# Patient Record
Sex: MALE | Race: WHITE | Employment: FULL TIME | URBAN - METROPOLITAN AREA
[De-identification: names, ages, dates, MRNs, and addresses within clinical notes are randomized per-mention and may not be internally consistent; named-entity substitution may affect disease eponyms.]

---

## 2017-01-13 ENCOUNTER — HOSPITAL ENCOUNTER (OUTPATIENT)
Age: 52
Discharge: HOME | End: 2017-01-13
Attending: EMERGENCY MEDICINE
Payer: COMMERCIAL

## 2017-01-13 ENCOUNTER — APPOINTMENT (OUTPATIENT)
Dept: GENERAL RADIOLOGY | Age: 52
End: 2017-01-13
Attending: EMERGENCY MEDICINE
Payer: COMMERCIAL

## 2017-01-13 VITALS
RESPIRATION RATE: 16 BRPM | SYSTOLIC BLOOD PRESSURE: 178 MMHG | DIASTOLIC BLOOD PRESSURE: 96 MMHG | HEART RATE: 86 BPM | TEMPERATURE: 98 F | HEIGHT: 68 IN | WEIGHT: 255 LBS | OXYGEN SATURATION: 100 % | BODY MASS INDEX: 38.65 KG/M2

## 2017-01-13 DIAGNOSIS — T14.8XXA MUSCLE STRAIN: Primary | ICD-10-CM

## 2017-01-13 PROCEDURE — 73030 X-RAY EXAM OF SHOULDER: CPT

## 2017-01-13 PROCEDURE — 93010 ELECTROCARDIOGRAM REPORT: CPT | Performed by: EMERGENCY MEDICINE

## 2017-01-13 PROCEDURE — 71020 XR CHEST PA + LAT CHEST (CPT=71020): CPT

## 2017-01-13 PROCEDURE — 99214 OFFICE O/P EST MOD 30 MIN: CPT

## 2017-01-13 PROCEDURE — 93010 ELECTROCARDIOGRAM REPORT: CPT

## 2017-01-13 PROCEDURE — 93005 ELECTROCARDIOGRAM TRACING: CPT

## 2017-01-13 PROCEDURE — 99215 OFFICE O/P EST HI 40 MIN: CPT

## 2017-01-13 RX ORDER — METHYLPREDNISOLONE 4 MG/1
TABLET ORAL
Qty: 1 PACKAGE | Refills: 0 | Status: SHIPPED | OUTPATIENT
Start: 2017-01-13 | End: 2017-01-16

## 2017-01-13 RX ORDER — DIAZEPAM 2 MG/1
2 TABLET ORAL 2 TIMES DAILY
Qty: 14 TABLET | Refills: 0 | Status: SHIPPED | OUTPATIENT
Start: 2017-01-13 | End: 2017-01-16

## 2017-01-13 NOTE — ED PROVIDER NOTES
Fernando Bueno is a 54year old male who presents for evaluation left shoulder and arm pain and swelling. HPI:   Pt complains of left sided pain which radiates from the neck to the shoulder and to the chest and upper back.   Patient has had symptoms pres COMPLETE  07/19/2012    Comment scanned to media tab, 07/19/2012    INJ PARAVERT F JNT L/S 1 LEV Right 9/15/2014    Comment Procedure: FACET INJECTION UNDER FLUOROSCOPY;  Surgeon: Rosey Mosher DO;  Location: [address] [place] [address] BY  ADELAIDA within normal limits  Labs Reviewed - No data to display    XR CHEST PA + LAT CHEST (ENE=[id number])   Final Result    PROCEDURE: XR CHEST PA + LAT CHEST (CPT=71020)         COMPARISON: [facility], X CHEST PA LAT ROUTINE,     11/05/2009, 15:11. 11/05/2009, 15:11. INDICATIONS: Left upper back pain, left axillary chest and shoulder pain radiates into neck x1 week. Tingling down left arm. TECHNIQUE:   Two views. FINDINGS: CARDIAC/VASC: Normal.  No cardiac silhouette abnormality or cardiomegaly.

## 2017-01-16 ENCOUNTER — OFFICE VISIT (OUTPATIENT)
Dept: FAMILY MEDICINE CLINIC | Facility: CLINIC | Age: 52
End: 2017-01-16

## 2017-01-16 VITALS
HEART RATE: 76 BPM | SYSTOLIC BLOOD PRESSURE: 146 MMHG | BODY MASS INDEX: 38.49 KG/M2 | TEMPERATURE: 98 F | RESPIRATION RATE: 18 BRPM | HEIGHT: 68 IN | WEIGHT: 254 LBS | DIASTOLIC BLOOD PRESSURE: 97 MMHG

## 2017-01-16 DIAGNOSIS — M25.512 ACUTE PAIN OF LEFT SHOULDER: ICD-10-CM

## 2017-01-16 PROCEDURE — 99213 OFFICE O/P EST LOW 20 MIN: CPT | Performed by: FAMILY MEDICINE

## 2017-01-16 PROCEDURE — 99212 OFFICE O/P EST SF 10 MIN: CPT | Performed by: FAMILY MEDICINE

## 2017-01-16 RX ORDER — DIAZEPAM 2 MG/1
2 TABLET ORAL 2 TIMES DAILY
Qty: 30 TABLET | Refills: 0 | Status: SHIPPED | OUTPATIENT
Start: 2017-01-16 | End: 2017-03-06 | Stop reason: ALTCHOICE

## 2017-01-16 NOTE — PROGRESS NOTES
HPI:    Patient ID: Danilo Khan is a 54year old male. HPI Comments: Pt presents with pain of the left shoulder for the last week. Has had clicking of the shoulder. No injury or trauma.  Pt was seen in immediate care and was placed on steroid pack a Take 1 tablet (2 mg total) by mouth 2 (two) times daily. As needed.            Imaging & Referrals:  None       ZD#[id number]

## 2017-02-01 PROBLEM — M25.512 ACUTE PAIN OF LEFT SHOULDER: Status: ACTIVE | Noted: 2017-02-01

## 2017-02-01 PROBLEM — M54.12 CERVICAL RADICULITIS: Status: ACTIVE | Noted: 2017-02-01

## 2017-02-13 PROBLEM — M54.12 CERVICAL RADICULOPATHY: Status: ACTIVE | Noted: 2017-02-13

## 2017-03-06 PROBLEM — M50.20 HERNIATED CERVICAL DISC: Status: ACTIVE | Noted: 2017-03-06

## 2017-03-14 ENCOUNTER — HOSPITAL (OUTPATIENT)
Dept: OTHER | Age: 52
End: 2017-03-14
Attending: ORTHOPAEDIC SURGERY

## 2017-03-14 LAB
ANALYZER ANC (IANC): NORMAL
ERYTHROCYTE [DISTWIDTH] IN BLOOD: 13.1 % (ref 11–15)
HEMATOCRIT: 43.8 % (ref 39–51)
HGB BLD-MCNC: 15.2 GM/DL (ref 13–17)
INR PPP: 1
MCH RBC QN AUTO: 31.8 PG (ref 26–34)
MCHC RBC AUTO-ENTMCNC: 34.7 GM/DL (ref 32–36.5)
MCV RBC AUTO: 91.6 FL (ref 78–100)
PLATELET # BLD: 316 THOUSAND/MCL (ref 140–450)
PROTHROMBIN TIME: 10.7 SECONDS (ref 9.7–11.8)
PROTHROMBIN TIME: NORMAL
RBC # BLD: 4.78 MILLION/MCL (ref 4.5–5.9)
WBC # BLD: 7.7 THOUSAND/MCL (ref 4.2–11)

## 2017-04-05 ENCOUNTER — OFFICE VISIT (OUTPATIENT)
Dept: FAMILY MEDICINE CLINIC | Facility: CLINIC | Age: 52
End: 2017-04-05

## 2017-04-05 VITALS
BODY MASS INDEX: 38.04 KG/M2 | WEIGHT: 251 LBS | HEIGHT: 68 IN | SYSTOLIC BLOOD PRESSURE: 143 MMHG | RESPIRATION RATE: 20 BRPM | HEART RATE: 80 BPM | DIASTOLIC BLOOD PRESSURE: 103 MMHG | TEMPERATURE: 99 F

## 2017-04-05 DIAGNOSIS — Z01.818 PREOPERATIVE GENERAL PHYSICAL EXAMINATION: ICD-10-CM

## 2017-04-05 PROCEDURE — 99214 OFFICE O/P EST MOD 30 MIN: CPT | Performed by: FAMILY MEDICINE

## 2017-04-05 PROCEDURE — 99212 OFFICE O/P EST SF 10 MIN: CPT | Performed by: FAMILY MEDICINE

## 2017-04-05 NOTE — PROGRESS NOTES
HPI:    Patient ID: Trino Jane is a 54year old male. HPI Comments: Patient is here for for preoperative history and physical for cervical spine fusion surgery for herniated disk.  The patient will be having surgery with Dr Adonay Luevano on 4/19/17 at E cervical adenopathy. Neurological: He is alert. He has normal reflexes. Psychiatric: He has a normal mood and affect. His behavior is normal. Judgment and thought content normal.   Vitals reviewed.              ASSESSMENT/PLAN:   Preoperative general ph

## 2017-04-06 ENCOUNTER — LAB ENCOUNTER (OUTPATIENT)
Dept: LAB | Age: 52
End: 2017-04-06
Attending: FAMILY MEDICINE
Payer: COMMERCIAL

## 2017-04-06 DIAGNOSIS — Z01.818 PREOPERATIVE GENERAL PHYSICAL EXAMINATION: ICD-10-CM

## 2017-04-06 PROCEDURE — 80061 LIPID PANEL: CPT | Performed by: FAMILY MEDICINE

## 2017-04-06 PROCEDURE — 85025 COMPLETE CBC W/AUTO DIFF WBC: CPT

## 2017-04-06 PROCEDURE — 87641 MR-STAPH DNA AMP PROBE: CPT

## 2017-04-06 PROCEDURE — 80048 BASIC METABOLIC PNL TOTAL CA: CPT

## 2017-04-06 PROCEDURE — 80076 HEPATIC FUNCTION PANEL: CPT | Performed by: FAMILY MEDICINE

## 2017-04-06 PROCEDURE — 36415 COLL VENOUS BLD VENIPUNCTURE: CPT

## 2017-04-07 ENCOUNTER — PATIENT MESSAGE (OUTPATIENT)
Dept: FAMILY MEDICINE CLINIC | Facility: CLINIC | Age: 52
End: 2017-04-07

## 2017-04-07 PROBLEM — R39.198 DIFFICULTY URINATING: Status: ACTIVE | Noted: 2017-04-07

## 2017-04-08 ENCOUNTER — OFFICE VISIT (OUTPATIENT)
Dept: FAMILY MEDICINE CLINIC | Facility: CLINIC | Age: 52
End: 2017-04-08

## 2017-04-08 ENCOUNTER — APPOINTMENT (OUTPATIENT)
Dept: LAB | Age: 52
End: 2017-04-08
Attending: FAMILY MEDICINE
Payer: COMMERCIAL

## 2017-04-08 VITALS
HEIGHT: 68 IN | RESPIRATION RATE: 18 BRPM | BODY MASS INDEX: 37.59 KG/M2 | HEART RATE: 98 BPM | TEMPERATURE: 98 F | DIASTOLIC BLOOD PRESSURE: 82 MMHG | SYSTOLIC BLOOD PRESSURE: 136 MMHG | WEIGHT: 248 LBS

## 2017-04-08 DIAGNOSIS — I10 ESSENTIAL HYPERTENSION: ICD-10-CM

## 2017-04-08 DIAGNOSIS — R35.0 URINARY FREQUENCY: Primary | ICD-10-CM

## 2017-04-08 DIAGNOSIS — R35.0 URINARY FREQUENCY: ICD-10-CM

## 2017-04-08 PROCEDURE — 99213 OFFICE O/P EST LOW 20 MIN: CPT | Performed by: FAMILY MEDICINE

## 2017-04-08 PROCEDURE — 36415 COLL VENOUS BLD VENIPUNCTURE: CPT

## 2017-04-08 PROCEDURE — 99212 OFFICE O/P EST SF 10 MIN: CPT | Performed by: FAMILY MEDICINE

## 2017-04-08 NOTE — PROGRESS NOTES
HPI:    Patient ID: Danilo Khan is a 54year old male. HPI Comments: Patient is here for follow up for chronic medical issues- HTN. The patient is compliant with medications and no side effects.  There are no acute issues but patient had elevated bl recently; reviewed blood pressure readings from home and here: Reviewed lab results  - CPM; bp stable To monitor blood pressure; To call if any persistent elevation of blood pressure; Discussed good diet/activity and avoidance of energy drinks;  Follow up a

## 2017-04-11 NOTE — TELEPHONE ENCOUNTER
From: Elizabeth Burris  To: Fernie Walker MD  Sent: 4/7/2017 3:40 PM CDT  Subject: Prescription Question    I met with my surgeon today, surgery scheduled for April 19th. My blood pressure was 168/100.  He would like Dr. Tragn Low to adjust my blood pressure me

## 2017-04-19 ENCOUNTER — HOSPITAL ENCOUNTER (OUTPATIENT)
Facility: HOSPITAL | Age: 52
Setting detail: HOSPITAL OUTPATIENT SURGERY
Discharge: HOME | End: 2017-04-19
Attending: ORTHOPAEDIC SURGERY | Admitting: ORTHOPAEDIC SURGERY
Payer: COMMERCIAL

## 2017-04-19 ENCOUNTER — APPOINTMENT (OUTPATIENT)
Dept: GENERAL RADIOLOGY | Facility: HOSPITAL | Age: 52
End: 2017-04-19
Attending: ORTHOPAEDIC SURGERY
Payer: COMMERCIAL

## 2017-04-19 ENCOUNTER — ANESTHESIA (OUTPATIENT)
Dept: SURGERY | Facility: HOSPITAL | Age: 52
End: 2017-04-19
Payer: COMMERCIAL

## 2017-04-19 ENCOUNTER — SURGERY (OUTPATIENT)
Age: 52
End: 2017-04-19

## 2017-04-19 ENCOUNTER — ANESTHESIA EVENT (OUTPATIENT)
Dept: SURGERY | Facility: HOSPITAL | Age: 52
End: 2017-04-19
Payer: COMMERCIAL

## 2017-04-19 VITALS
RESPIRATION RATE: 18 BRPM | DIASTOLIC BLOOD PRESSURE: 96 MMHG | HEIGHT: 68 IN | SYSTOLIC BLOOD PRESSURE: 161 MMHG | OXYGEN SATURATION: 94 % | WEIGHT: 246 LBS | TEMPERATURE: 98 F | HEART RATE: 103 BPM | BODY MASS INDEX: 37.28 KG/M2

## 2017-04-19 DIAGNOSIS — M48.02 CERVICAL SPINAL STENOSIS: Primary | ICD-10-CM

## 2017-04-19 PROCEDURE — 77003 FLUOROGUIDE FOR SPINE INJECT: CPT

## 2017-04-19 PROCEDURE — 0RB30ZZ EXCISION OF CERVICAL VERTEBRAL DISC, OPEN APPROACH: ICD-10-PCS | Performed by: ORTHOPAEDIC SURGERY

## 2017-04-19 PROCEDURE — 0RG20A0 FUSION OF 2 OR MORE CERVICAL VERTEBRAL JOINTS WITH INTERBODY FUSION DEVICE, ANTERIOR APPROACH, ANTERIOR COLUMN, OPEN APPROACH: ICD-10-PCS | Performed by: ORTHOPAEDIC SURGERY

## 2017-04-19 PROCEDURE — 95938 SOMATOSENSORY TESTING: CPT | Performed by: ORTHOPAEDIC SURGERY

## 2017-04-19 PROCEDURE — 0RG2070 FUSION OF 2 OR MORE CERVICAL VERTEBRAL JOINTS WITH AUTOLOGOUS TISSUE SUBSTITUTE, ANTERIOR APPROACH, ANTERIOR COLUMN, OPEN APPROACH: ICD-10-PCS | Performed by: ORTHOPAEDIC SURGERY

## 2017-04-19 PROCEDURE — 94010 BREATHING CAPACITY TEST: CPT | Performed by: ORTHOPAEDIC SURGERY

## 2017-04-19 DEVICE — FLOSEAL SEALENT STERILE 10ML: Type: IMPLANTABLE DEVICE | Status: FUNCTIONAL

## 2017-04-19 RX ORDER — FAMOTIDINE 20 MG/1
20 TABLET ORAL ONCE
Status: COMPLETED | OUTPATIENT
Start: 2017-04-19 | End: 2017-04-19

## 2017-04-19 RX ORDER — ACETAMINOPHEN 325 MG/1
650 TABLET ORAL EVERY 4 HOURS PRN
Status: DISCONTINUED | OUTPATIENT
Start: 2017-04-19 | End: 2017-04-19

## 2017-04-19 RX ORDER — EPHEDRINE SULFATE 50 MG/ML
INJECTION, SOLUTION INTRAVENOUS AS NEEDED
Status: DISCONTINUED | OUTPATIENT
Start: 2017-04-19 | End: 2017-04-19 | Stop reason: SURG

## 2017-04-19 RX ORDER — ROCURONIUM BROMIDE 10 MG/ML
INJECTION, SOLUTION INTRAVENOUS AS NEEDED
Status: DISCONTINUED | OUTPATIENT
Start: 2017-04-19 | End: 2017-04-19 | Stop reason: SURG

## 2017-04-19 RX ORDER — SODIUM PHOSPHATE, DIBASIC AND SODIUM PHOSPHATE, MONOBASIC 7; 19 G/133ML; G/133ML
1 ENEMA RECTAL ONCE AS NEEDED
Status: DISCONTINUED | OUTPATIENT
Start: 2017-04-19 | End: 2017-04-19

## 2017-04-19 RX ORDER — TIZANIDINE 4 MG/1
4 TABLET ORAL 3 TIMES DAILY PRN
Status: DISCONTINUED | OUTPATIENT
Start: 2017-04-19 | End: 2017-04-19

## 2017-04-19 RX ORDER — SODIUM CHLORIDE, SODIUM LACTATE, POTASSIUM CHLORIDE, CALCIUM CHLORIDE 600; 310; 30; 20 MG/100ML; MG/100ML; MG/100ML; MG/100ML
INJECTION, SOLUTION INTRAVENOUS CONTINUOUS
Status: DISCONTINUED | OUTPATIENT
Start: 2017-04-19 | End: 2017-04-19

## 2017-04-19 RX ORDER — BISACODYL 10 MG
10 SUPPOSITORY, RECTAL RECTAL
Status: DISCONTINUED | OUTPATIENT
Start: 2017-04-19 | End: 2017-04-19

## 2017-04-19 RX ORDER — DOCUSATE SODIUM 100 MG/1
100 CAPSULE, LIQUID FILLED ORAL 2 TIMES DAILY
Status: DISCONTINUED | OUTPATIENT
Start: 2017-04-19 | End: 2017-04-19

## 2017-04-19 RX ORDER — DEXAMETHASONE SODIUM PHOSPHATE 4 MG/ML
VIAL (ML) INJECTION AS NEEDED
Status: DISCONTINUED | OUTPATIENT
Start: 2017-04-19 | End: 2017-04-19 | Stop reason: SURG

## 2017-04-19 RX ORDER — MORPHINE SULFATE 10 MG/ML
6 INJECTION, SOLUTION INTRAMUSCULAR; INTRAVENOUS EVERY 10 MIN PRN
Status: DISCONTINUED | OUTPATIENT
Start: 2017-04-19 | End: 2017-04-19

## 2017-04-19 RX ORDER — GLYCOPYRROLATE 0.2 MG/ML
INJECTION INTRAMUSCULAR; INTRAVENOUS AS NEEDED
Status: DISCONTINUED | OUTPATIENT
Start: 2017-04-19 | End: 2017-04-19 | Stop reason: SURG

## 2017-04-19 RX ORDER — ONDANSETRON 2 MG/ML
4 INJECTION INTRAMUSCULAR; INTRAVENOUS EVERY 4 HOURS PRN
Status: DISCONTINUED | OUTPATIENT
Start: 2017-04-19 | End: 2017-04-19

## 2017-04-19 RX ORDER — BUPIVACAINE HYDROCHLORIDE AND EPINEPHRINE 2.5; 5 MG/ML; UG/ML
INJECTION, SOLUTION INFILTRATION; PERINEURAL AS NEEDED
Status: DISCONTINUED | OUTPATIENT
Start: 2017-04-19 | End: 2017-04-19

## 2017-04-19 RX ORDER — MIDAZOLAM HYDROCHLORIDE 1 MG/ML
INJECTION INTRAMUSCULAR; INTRAVENOUS AS NEEDED
Status: DISCONTINUED | OUTPATIENT
Start: 2017-04-19 | End: 2017-04-19 | Stop reason: SURG

## 2017-04-19 RX ORDER — METOCLOPRAMIDE 10 MG/1
10 TABLET ORAL ONCE
Status: COMPLETED | OUTPATIENT
Start: 2017-04-19 | End: 2017-04-19

## 2017-04-19 RX ORDER — NEOSTIGMINE METHYLSULFATE 0.5 MG/ML
INJECTION INTRAVENOUS AS NEEDED
Status: DISCONTINUED | OUTPATIENT
Start: 2017-04-19 | End: 2017-04-19 | Stop reason: SURG

## 2017-04-19 RX ORDER — ACETAMINOPHEN 325 MG/1
650 TABLET ORAL ONCE
Status: DISCONTINUED | OUTPATIENT
Start: 2017-04-19 | End: 2017-04-19

## 2017-04-19 RX ORDER — NALOXONE HYDROCHLORIDE 0.4 MG/ML
80 INJECTION, SOLUTION INTRAMUSCULAR; INTRAVENOUS; SUBCUTANEOUS AS NEEDED
Status: ACTIVE | OUTPATIENT
Start: 2017-04-19 | End: 2017-04-19

## 2017-04-19 RX ORDER — HYDROCODONE BITARTRATE AND ACETAMINOPHEN 10; 325 MG/1; MG/1
2 TABLET ORAL EVERY 4 HOURS PRN
Status: DISCONTINUED | OUTPATIENT
Start: 2017-04-19 | End: 2017-04-19

## 2017-04-19 RX ORDER — MORPHINE SULFATE 4 MG/ML
4 INJECTION, SOLUTION INTRAMUSCULAR; INTRAVENOUS EVERY 10 MIN PRN
Status: DISCONTINUED | OUTPATIENT
Start: 2017-04-19 | End: 2017-04-19

## 2017-04-19 RX ORDER — ONDANSETRON 2 MG/ML
INJECTION INTRAMUSCULAR; INTRAVENOUS AS NEEDED
Status: DISCONTINUED | OUTPATIENT
Start: 2017-04-19 | End: 2017-04-19 | Stop reason: SURG

## 2017-04-19 RX ORDER — HYDROMORPHONE HYDROCHLORIDE 1 MG/ML
0.6 INJECTION, SOLUTION INTRAMUSCULAR; INTRAVENOUS; SUBCUTANEOUS EVERY 5 MIN PRN
Status: DISCONTINUED | OUTPATIENT
Start: 2017-04-19 | End: 2017-04-19

## 2017-04-19 RX ORDER — HYDROMORPHONE HYDROCHLORIDE 1 MG/ML
0.4 INJECTION, SOLUTION INTRAMUSCULAR; INTRAVENOUS; SUBCUTANEOUS EVERY 5 MIN PRN
Status: DISCONTINUED | OUTPATIENT
Start: 2017-04-19 | End: 2017-04-19

## 2017-04-19 RX ORDER — SENNOSIDES 8.6 MG
17.2 TABLET ORAL NIGHTLY
Status: DISCONTINUED | OUTPATIENT
Start: 2017-04-19 | End: 2017-04-19

## 2017-04-19 RX ORDER — HYDROCODONE BITARTRATE AND ACETAMINOPHEN 5; 325 MG/1; MG/1
1 TABLET ORAL AS NEEDED
Status: COMPLETED | OUTPATIENT
Start: 2017-04-19 | End: 2017-04-19

## 2017-04-19 RX ORDER — LIDOCAINE HYDROCHLORIDE 10 MG/ML
INJECTION, SOLUTION EPIDURAL; INFILTRATION; INTRACAUDAL; PERINEURAL AS NEEDED
Status: DISCONTINUED | OUTPATIENT
Start: 2017-04-19 | End: 2017-04-19 | Stop reason: SURG

## 2017-04-19 RX ORDER — HYDROCODONE BITARTRATE AND ACETAMINOPHEN 10; 325 MG/1; MG/1
1-2 TABLET ORAL EVERY 6 HOURS PRN
Qty: 40 TABLET | Refills: 0 | Status: SHIPPED | OUTPATIENT
Start: 2017-04-19 | End: 2017-04-24

## 2017-04-19 RX ORDER — HYDROCODONE BITARTRATE AND ACETAMINOPHEN 5; 325 MG/1; MG/1
2 TABLET ORAL AS NEEDED
Status: COMPLETED | OUTPATIENT
Start: 2017-04-19 | End: 2017-04-19

## 2017-04-19 RX ORDER — TIZANIDINE HYDROCHLORIDE 4 MG/1
4 CAPSULE, GELATIN COATED ORAL 3 TIMES DAILY PRN
Qty: 30 CAPSULE | Refills: 0 | Status: SHIPPED | OUTPATIENT
Start: 2017-04-19 | End: 2017-07-25

## 2017-04-19 RX ORDER — HALOPERIDOL 5 MG/ML
0.25 INJECTION INTRAMUSCULAR ONCE AS NEEDED
Status: DISCONTINUED | OUTPATIENT
Start: 2017-04-19 | End: 2017-04-19

## 2017-04-19 RX ORDER — HYDROCODONE BITARTRATE AND ACETAMINOPHEN 10; 325 MG/1; MG/1
1 TABLET ORAL EVERY 4 HOURS PRN
Status: DISCONTINUED | OUTPATIENT
Start: 2017-04-19 | End: 2017-04-19

## 2017-04-19 RX ORDER — BACITRACIN 50000 [USP'U]/1
INJECTION, POWDER, LYOPHILIZED, FOR SOLUTION INTRAMUSCULAR AS NEEDED
Status: DISCONTINUED | OUTPATIENT
Start: 2017-04-19 | End: 2017-04-19

## 2017-04-19 RX ORDER — DIPHENHYDRAMINE HYDROCHLORIDE 50 MG/ML
25 INJECTION INTRAMUSCULAR; INTRAVENOUS EVERY 4 HOURS PRN
Status: DISCONTINUED | OUTPATIENT
Start: 2017-04-19 | End: 2017-04-19

## 2017-04-19 RX ORDER — METHYLPREDNISOLONE 4 MG/1
TABLET ORAL
Qty: 21 TABLET | Refills: 0 | Status: SHIPPED | OUTPATIENT
Start: 2017-04-19 | End: 2017-06-05 | Stop reason: ALTCHOICE

## 2017-04-19 RX ORDER — HYDROMORPHONE HYDROCHLORIDE 1 MG/ML
INJECTION, SOLUTION INTRAMUSCULAR; INTRAVENOUS; SUBCUTANEOUS AS NEEDED
Status: DISCONTINUED | OUTPATIENT
Start: 2017-04-19 | End: 2017-04-19 | Stop reason: SURG

## 2017-04-19 RX ORDER — DIAZEPAM 5 MG/1
5 TABLET ORAL EVERY 6 HOURS PRN
Status: DISCONTINUED | OUTPATIENT
Start: 2017-04-19 | End: 2017-04-19

## 2017-04-19 RX ORDER — HYDROMORPHONE HYDROCHLORIDE 1 MG/ML
0.2 INJECTION, SOLUTION INTRAMUSCULAR; INTRAVENOUS; SUBCUTANEOUS EVERY 5 MIN PRN
Status: DISCONTINUED | OUTPATIENT
Start: 2017-04-19 | End: 2017-04-19

## 2017-04-19 RX ORDER — ONDANSETRON 2 MG/ML
4 INJECTION INTRAMUSCULAR; INTRAVENOUS ONCE AS NEEDED
Status: DISCONTINUED | OUTPATIENT
Start: 2017-04-19 | End: 2017-04-19

## 2017-04-19 RX ORDER — DIPHENHYDRAMINE HCL 25 MG
25 CAPSULE ORAL EVERY 4 HOURS PRN
Status: DISCONTINUED | OUTPATIENT
Start: 2017-04-19 | End: 2017-04-19

## 2017-04-19 RX ORDER — MORPHINE SULFATE 2 MG/ML
2 INJECTION, SOLUTION INTRAMUSCULAR; INTRAVENOUS EVERY 10 MIN PRN
Status: DISCONTINUED | OUTPATIENT
Start: 2017-04-19 | End: 2017-04-19

## 2017-04-19 RX ORDER — POLYETHYLENE GLYCOL 3350 17 G/17G
17 POWDER, FOR SOLUTION ORAL DAILY PRN
Status: DISCONTINUED | OUTPATIENT
Start: 2017-04-19 | End: 2017-04-19

## 2017-04-19 RX ORDER — METOCLOPRAMIDE HYDROCHLORIDE 5 MG/ML
10 INJECTION INTRAMUSCULAR; INTRAVENOUS EVERY 6 HOURS PRN
Status: DISCONTINUED | OUTPATIENT
Start: 2017-04-19 | End: 2017-04-19

## 2017-04-19 RX ADMIN — GLYCOPYRROLATE 0.4 MG: 0.2 INJECTION INTRAMUSCULAR; INTRAVENOUS at 10:00:00

## 2017-04-19 RX ADMIN — EPHEDRINE SULFATE 10 MG: 50 INJECTION, SOLUTION INTRAVENOUS at 09:00:00

## 2017-04-19 RX ADMIN — ONDANSETRON 4 MG: 2 INJECTION INTRAMUSCULAR; INTRAVENOUS at 09:50:00

## 2017-04-19 RX ADMIN — SODIUM CHLORIDE, SODIUM LACTATE, POTASSIUM CHLORIDE, CALCIUM CHLORIDE: 600; 310; 30; 20 INJECTION, SOLUTION INTRAVENOUS at 10:10:00

## 2017-04-19 RX ADMIN — DEXAMETHASONE SODIUM PHOSPHATE 4 MG: 4 MG/ML VIAL (ML) INJECTION at 07:57:00

## 2017-04-19 RX ADMIN — ROCURONIUM BROMIDE 50 MG: 10 INJECTION, SOLUTION INTRAVENOUS at 07:57:00

## 2017-04-19 RX ADMIN — EPHEDRINE SULFATE 10 MG: 50 INJECTION, SOLUTION INTRAVENOUS at 08:15:00

## 2017-04-19 RX ADMIN — MIDAZOLAM HYDROCHLORIDE 2 MG: 1 INJECTION INTRAMUSCULAR; INTRAVENOUS at 07:47:00

## 2017-04-19 RX ADMIN — SODIUM CHLORIDE, SODIUM LACTATE, POTASSIUM CHLORIDE, CALCIUM CHLORIDE: 600; 310; 30; 20 INJECTION, SOLUTION INTRAVENOUS at 08:55:00

## 2017-04-19 RX ADMIN — NEOSTIGMINE METHYLSULFATE 3 MG: 0.5 INJECTION INTRAVENOUS at 10:00:00

## 2017-04-19 RX ADMIN — HYDROMORPHONE HYDROCHLORIDE 0.2 MG: 1 INJECTION, SOLUTION INTRAMUSCULAR; INTRAVENOUS; SUBCUTANEOUS at 10:05:00

## 2017-04-19 RX ADMIN — HYDROMORPHONE HYDROCHLORIDE 0.2 MG: 1 INJECTION, SOLUTION INTRAMUSCULAR; INTRAVENOUS; SUBCUTANEOUS at 10:00:00

## 2017-04-19 RX ADMIN — DEXAMETHASONE SODIUM PHOSPHATE 6 MG: 4 MG/ML VIAL (ML) INJECTION at 08:00:00

## 2017-04-19 RX ADMIN — HYDROMORPHONE HYDROCHLORIDE 0.6 MG: 1 INJECTION, SOLUTION INTRAMUSCULAR; INTRAVENOUS; SUBCUTANEOUS at 08:00:00

## 2017-04-19 RX ADMIN — LIDOCAINE HYDROCHLORIDE 50 MG: 10 INJECTION, SOLUTION EPIDURAL; INFILTRATION; INTRACAUDAL; PERINEURAL at 07:57:00

## 2017-04-19 NOTE — ANESTHESIA PREPROCEDURE EVALUATION
Anesthesia PreOp Note    HPI:     Trino Jane is a 54year old male who presents for preoperative consultation requested by:  Mitch Contreras MD    Date of Surgery: 4/19/2017    Procedure(s):  ANTERIOR CERVICAL FUSION BG & INST 1 LEVEL  Indication: Cer L4    BACK SURGERY  1/27/2015    Comment lumbar hardware removal         Prescriptions prior to admission:  hydrocodone-acetaminophen (NORCO) 7.5-325 MG Oral Tab Take 1 tablet by mouth every 6 (six) hours as needed for Pain.  Disp: 40 tablet Rfl: 0 at 0530 RDW 13.1 04/06/2017    04/06/2017   MPV 7.9 04/06/2017       Lab Results  Component Value Date    04/06/2017   K 3.9 04/06/2017   CL 99 04/06/2017   CO2 29 04/06/2017   BUN 15 04/06/2017   CREATSERUM 0.92 04/06/2017   GLU 91 04/06/2017   [place]

## 2017-04-19 NOTE — H&P
[facility] Patient Status:  Hospital Outpatient Surgery    1965 MRN [id number]   Location [facility] Attending Saumya Haley MD   Hosp Day # 0 PCP Christin Wiggins MD     Active Problems:

## 2017-04-19 NOTE — ANESTHESIA POSTPROCEDURE EVALUATION
Patient: Mateus Brown    Procedure Summary     Date Anesthesia Start Anesthesia Stop Room / Location    04/19/17 0747  [facility] OR  / [facility] MAIN OR       Procedure Diagnosis Surgeon Responsible Provider    ANTERIOR CERVICAL FUSION BG & INST 1 LEVEL (N/

## 2017-04-19 NOTE — OPERATIVE REPORT
[facility]    PATIENT'S NAME: [facility]   ATTENDING PHYSICIAN: Travis Pederson. Robbin Whipple MD   OPERATING PHYSICIAN: Travis Pederson.  Robbin Whipple MD   PATIENT ACCOUNT#:   [id number]    LOCATION:  [address]  MEDICAL RECORD #:   [id number] The shoulders were taped down. The neck was prepped and draped in the usual fashion. He was treated with preoperative antibiotics and IV steroids.       After localization with intraoperative x-ray, a 1-1/2 inch incision was made on the left side in [place] ligament. A lightly decorticated of the endplates from the posteromedial aspect of the uncovertebral joints was performed with a high-speed drill.   The posterior annulus and posterior longitudinal ligament were removed all the way across for a full decomp

## 2017-04-19 NOTE — BRIEF OP NOTE
Pre-Operative Diagnosis: Cervical stenosis     Post-Operative Diagnosis: Cervical stenosis     Procedure Performed:   Procedure(s):   Anterior cervical diskectomy fusion cervical 4-5-6 plate, cages, allograft    Surgeon(s) and Role:     Juliana Mcgregor,

## 2017-04-20 ENCOUNTER — TELEPHONE (OUTPATIENT)
Dept: INTERNAL MEDICINE UNIT | Facility: HOSPITAL | Age: 52
End: 2017-04-20

## 2017-04-20 NOTE — TELEPHONE ENCOUNTER
Patient discharged from [facility] on April 19, 2017. Please call patient to schedule hospital follow-up appointment with PCP, Dr. Mauro Holter.

## 2017-05-01 PROBLEM — M48.02 SPINAL STENOSIS, CERVICAL REGION: Status: ACTIVE | Noted: 2017-05-01

## 2017-06-19 PROBLEM — G56.02 LEFT CARPAL TUNNEL SYNDROME: Status: ACTIVE | Noted: 2017-06-19

## 2017-06-19 PROBLEM — M54.12 LEFT CERVICAL RADICULOPATHY: Status: ACTIVE | Noted: 2017-06-19

## 2017-07-03 RX ORDER — VALSARTAN AND HYDROCHLOROTHIAZIDE 160; 12.5 MG/1; MG/1
1 TABLET, FILM COATED ORAL
Qty: 30 TABLET | Refills: 11 | Status: SHIPPED | OUTPATIENT
Start: 2017-07-03 | End: 2018-06-05

## 2017-07-03 NOTE — TELEPHONE ENCOUNTER
Message noted: Chart reviewed and may refill medication as requested times one with 11 additional refills. Prescription sent to listed pharmacy. Pharmacy to notify patient.

## 2017-07-25 ENCOUNTER — OFFICE VISIT (OUTPATIENT)
Dept: FAMILY MEDICINE CLINIC | Facility: CLINIC | Age: 52
End: 2017-07-25

## 2017-07-25 VITALS
HEIGHT: 68 IN | SYSTOLIC BLOOD PRESSURE: 158 MMHG | HEART RATE: 92 BPM | TEMPERATURE: 98 F | BODY MASS INDEX: 38.8 KG/M2 | OXYGEN SATURATION: 93 % | RESPIRATION RATE: 20 BRPM | WEIGHT: 256 LBS | DIASTOLIC BLOOD PRESSURE: 109 MMHG

## 2017-07-25 DIAGNOSIS — J06.9 ACUTE URI: ICD-10-CM

## 2017-07-25 PROCEDURE — 99213 OFFICE O/P EST LOW 20 MIN: CPT | Performed by: FAMILY MEDICINE

## 2017-07-25 PROCEDURE — 99212 OFFICE O/P EST SF 10 MIN: CPT | Performed by: FAMILY MEDICINE

## 2017-07-25 RX ORDER — ALBUTEROL SULFATE 90 UG/1
2 AEROSOL, METERED RESPIRATORY (INHALATION) EVERY 4 HOURS PRN
Qty: 1 INHALER | Refills: 0 | Status: SHIPPED | OUTPATIENT
Start: 2017-07-25 | End: 2018-03-24

## 2017-07-25 RX ORDER — AZITHROMYCIN 250 MG/1
TABLET, FILM COATED ORAL
Qty: 6 TABLET | Refills: 0 | Status: SHIPPED | OUTPATIENT
Start: 2017-07-25 | End: 2017-09-28 | Stop reason: ALTCHOICE

## 2017-07-25 RX ORDER — PREDNISONE 20 MG/1
TABLET ORAL
Qty: 10 TABLET | Refills: 0 | Status: SHIPPED | OUTPATIENT
Start: 2017-07-25 | End: 2017-09-28 | Stop reason: ALTCHOICE

## 2017-07-25 NOTE — PROGRESS NOTES
HPI:    Patient ID: Nida Alexandre is a 54year old male. Pt presents with cold symptoms for 2 days. Pt has had some SOB, body aches, cough, sore throat. Has had some fevers. Pt has tried otc remedies with some relief. Pt states no sick contacts.  NO C directed; Over the counter remedies for fevers discussed; To call if worse or not better; Follow up in one week if not resolved or as needed if worse. No orders of the defined types were placed in this encounter.       Meds This Visit:  Signed Prescrip

## 2017-09-28 ENCOUNTER — HOSPITAL ENCOUNTER (EMERGENCY)
Facility: HOSPITAL | Age: 52
Discharge: HOME | End: 2017-09-28
Attending: EMERGENCY MEDICINE
Payer: COMMERCIAL

## 2017-09-28 ENCOUNTER — APPOINTMENT (OUTPATIENT)
Dept: ULTRASOUND IMAGING | Facility: HOSPITAL | Age: 52
End: 2017-09-28
Attending: EMERGENCY MEDICINE
Payer: COMMERCIAL

## 2017-09-28 ENCOUNTER — HOSPITAL ENCOUNTER (OUTPATIENT)
Age: 52
Discharge: OTHER INSTITUTION | End: 2017-09-28
Attending: FAMILY MEDICINE
Payer: COMMERCIAL

## 2017-09-28 VITALS
OXYGEN SATURATION: 100 % | HEIGHT: 68 IN | TEMPERATURE: 98 F | WEIGHT: 250 LBS | SYSTOLIC BLOOD PRESSURE: 145 MMHG | DIASTOLIC BLOOD PRESSURE: 82 MMHG | RESPIRATION RATE: 18 BRPM | BODY MASS INDEX: 37.89 KG/M2 | HEART RATE: 78 BPM

## 2017-09-28 VITALS
TEMPERATURE: 99 F | RESPIRATION RATE: 18 BRPM | HEART RATE: 83 BPM | WEIGHT: 250 LBS | DIASTOLIC BLOOD PRESSURE: 100 MMHG | OXYGEN SATURATION: 100 % | BODY MASS INDEX: 37.89 KG/M2 | SYSTOLIC BLOOD PRESSURE: 176 MMHG | HEIGHT: 68 IN

## 2017-09-28 DIAGNOSIS — R60.0 LEG EDEMA, RIGHT: ICD-10-CM

## 2017-09-28 DIAGNOSIS — N50.89 TESTICULAR SWELLING, RIGHT: Primary | ICD-10-CM

## 2017-09-28 DIAGNOSIS — R60.0 EDEMA OF RIGHT LOWER EXTREMITY: ICD-10-CM

## 2017-09-28 DIAGNOSIS — M54.16 LUMBAR RADICULOPATHY: Primary | ICD-10-CM

## 2017-09-28 DIAGNOSIS — R03.0 ELEVATED BLOOD PRESSURE READING: ICD-10-CM

## 2017-09-28 PROCEDURE — 93975 VASCULAR STUDY: CPT | Performed by: EMERGENCY MEDICINE

## 2017-09-28 PROCEDURE — 93971 EXTREMITY STUDY: CPT | Performed by: EMERGENCY MEDICINE

## 2017-09-28 PROCEDURE — 99213 OFFICE O/P EST LOW 20 MIN: CPT

## 2017-09-28 PROCEDURE — 76870 US EXAM SCROTUM: CPT | Performed by: EMERGENCY MEDICINE

## 2017-09-28 PROCEDURE — 99284 EMERGENCY DEPT VISIT MOD MDM: CPT

## 2017-09-28 RX ORDER — TRAMADOL HYDROCHLORIDE 50 MG/1
50 TABLET ORAL EVERY 4 HOURS PRN
Qty: 14 TABLET | Refills: 0 | Status: SHIPPED | OUTPATIENT
Start: 2017-09-28 | End: 2017-10-05

## 2017-09-28 RX ORDER — CYCLOBENZAPRINE HCL 10 MG
10 TABLET ORAL 3 TIMES DAILY PRN
Qty: 20 TABLET | Refills: 0 | Status: SHIPPED | OUTPATIENT
Start: 2017-09-28 | End: 2017-10-05

## 2017-09-28 RX ORDER — CYCLOBENZAPRINE HCL 10 MG
10 TABLET ORAL ONCE
Status: COMPLETED | OUTPATIENT
Start: 2017-09-28 | End: 2017-09-28

## 2017-09-28 RX ORDER — PREDNISONE 20 MG/1
60 TABLET ORAL ONCE
Status: COMPLETED | OUTPATIENT
Start: 2017-09-28 | End: 2017-09-28

## 2017-09-28 RX ORDER — PREDNISONE 20 MG/1
40 TABLET ORAL DAILY
Qty: 8 TABLET | Refills: 0 | Status: SHIPPED | OUTPATIENT
Start: 2017-09-28 | End: 2017-10-02

## 2017-09-28 RX ORDER — IBUPROFEN 600 MG/1
600 TABLET ORAL ONCE
Status: COMPLETED | OUTPATIENT
Start: 2017-09-28 | End: 2017-09-28

## 2017-09-28 NOTE — ED INITIAL ASSESSMENT (HPI)
Sent from [facility] for swelling of right lower leg and ankle and right testicles x 1 wk

## 2017-09-28 NOTE — ED NOTES
AS RN LEAVING ROOM PATIENT STATES \"MY RIGHT TESTICLE HAS BEEN SWOLLEN FOR A FEW DAYS.  ITS NOT AS SWOLLEN AS IT WAS BUT ITS STILL SWOLLEN\"

## 2017-09-28 NOTE — ED PROVIDER NOTES
Patient Seen in: [facility]    History   Patient presents with:  Leg Pain    Stated Complaint: Rt leg swelling    HPI  Pt is a 53 yo with a h/o HTN and lumbar disc surgeries.  He complains of a 2-3 week h/o low back pain w Systems    Positive for stated complaint: Rt leg swelling  Other systems are as noted in HPI. Constitutional and vital signs reviewed. All other systems reviewed and negative except as noted above.     PSFH elements reviewed from today and agreed exce

## 2017-09-28 NOTE — ED INITIAL ASSESSMENT (HPI)
PATIENT ARRIVED AMBULATORY TO ROOM C/O RIGHT LOWER EXTREMITY PAIN.  PATIENT STATES \"I'VE BEEN HAVING SWELLING FROM MY KNEE DOWN ON THE RIGHT LEG FOR 3 DAYS\" PATIENT STATES \"I HAD BACK SURGERY AND HAVE BEEN HAVING SOME SCIATICA AND MY RIGHT LEG/HIP HAS BE

## 2017-09-28 NOTE — ED NOTES
REPORT GIVEN TO Kelby Castellanos [address] RN. PATIENT EN ROUTE TO [facility] ED WITH FAMILY.

## 2017-10-02 NOTE — ED PROVIDER NOTES
Patient Seen in: [facility] AND [facility] Emergency Department    History   Patient presents with:  Pain    Stated Complaint:     HPI    Patient complains of  Swollen r  leg. Started few days ago. Has r lumbar radiculopathy but noted some swelling. Gigi Sanchez ches Breath. VALSARTAN-HYDROCHLOROTHIAZIDE 160-12.5 MG Oral Tab,  TAKE 1 TABLET BY MOUTH ONCE DAILY.        Family History   Problem Relation Age of Onset   • Hypertension Mother    • Thyroid disease Mother    • Hypertension Father    • Heart Disease Father Right - Diag Img (cpt=93971)    Result Date: 9/28/2017  CONCLUSION: No evidence of right lower extremity DVT. Us Scrotum W/ Doppler (cpt=93975/04886)    Result Date: 9/28/2017  CONCLUSION:  1. Normal bilateral testicular ultrasound.  Symmetric flow

## 2018-03-15 ENCOUNTER — OFFICE VISIT (OUTPATIENT)
Dept: ORTHOPEDICS CLINIC | Facility: CLINIC | Age: 53
End: 2018-03-15

## 2018-03-15 ENCOUNTER — HOSPITAL ENCOUNTER (OUTPATIENT)
Dept: GENERAL RADIOLOGY | Facility: HOSPITAL | Age: 53
Discharge: HOME | End: 2018-03-15
Attending: ORTHOPAEDIC SURGERY
Payer: COMMERCIAL

## 2018-03-15 DIAGNOSIS — G56.02 LEFT CARPAL TUNNEL SYNDROME: Primary | ICD-10-CM

## 2018-03-15 DIAGNOSIS — G89.29 CHRONIC LEFT SHOULDER PAIN: ICD-10-CM

## 2018-03-15 DIAGNOSIS — M25.512 CHRONIC LEFT SHOULDER PAIN: ICD-10-CM

## 2018-03-15 DIAGNOSIS — IMO0002 DISORDER OF ROTATOR CUFF SYNDROME OF LEFT SHOULDER AND ALLIED DISORDER: ICD-10-CM

## 2018-03-15 DIAGNOSIS — M75.22 BICEPS TENDONITIS ON LEFT: ICD-10-CM

## 2018-03-15 PROCEDURE — 99214 OFFICE O/P EST MOD 30 MIN: CPT | Performed by: ORTHOPAEDIC SURGERY

## 2018-03-15 PROCEDURE — 73030 X-RAY EXAM OF SHOULDER: CPT | Performed by: ORTHOPAEDIC SURGERY

## 2018-03-15 PROCEDURE — 99212 OFFICE O/P EST SF 10 MIN: CPT | Performed by: ORTHOPAEDIC SURGERY

## 2018-03-15 NOTE — PROGRESS NOTES
3/15/2018  Grant Lorenz  11/20/1965  50year old   male  Pretty Yost MD    HPI:   Patient presents with:  Carpal Tunnel Syndrome: Left f/u - was seen at [facility] in 7/2017 and he got an injection - pain got back and dang numbness is more pronounced for KRISTA Right      Comment: Procedure: FACET INJECTION UNDER FLUOROSCOPY;                Surgeon: Grover Morales DO;  Location: [address] [place] [address]  9/15/2014: JILL BY SABRINA FLORES [facility] 5+ YR Right      Comment: Procedure: FACET INJ degrees and internal rotation of 30 degrees on the opposite side. The patient has 5/5 strength in elevation, external rotation, and internal rotation.   Rotator cuff testing is negative with a negative Luisa sign, external rotation lag sign, Hornblower's si including death. The patient consented to the procedure. He would like an MRI of his left shoulder to evaluate for rotator cuff tear. All of their questions were answered and they are in agreement with the treatment plan.     The patient will return t

## 2018-03-20 ENCOUNTER — TELEPHONE (OUTPATIENT)
Dept: ORTHOPEDICS CLINIC | Facility: CLINIC | Age: 53
End: 2018-03-20

## 2018-03-20 NOTE — TELEPHONE ENCOUNTER
Called MARKUS and s/w Sherrell Driver to initiate PA for MRI left shoulder. Gave clinicals per ADRIENNE OV notes. Call transferred to RN reviewer, all-MRI approved [facility]#[id number] exp 4/18/18.   Called pt KAELA

## 2018-03-21 ENCOUNTER — TELEPHONE (OUTPATIENT)
Dept: ORTHOPEDICS CLINIC | Facility: CLINIC | Age: 53
End: 2018-03-21

## 2018-03-21 NOTE — TELEPHONE ENCOUNTER
Call to [facility]   Surgery with Dr. Fatimah Beltran  Left  Carpal tunnel  [address]      Diagnosis code G56.02    Spoke to Timmy Martin  No prior authorization required  Reference number [id number]

## 2018-03-21 NOTE — TELEPHONE ENCOUNTER
s/w pt and informed him of MRI approval and exp date. Gave him phone # to CS to make appt and advised to f/u after for results.

## 2018-03-26 ENCOUNTER — TELEPHONE (OUTPATIENT)
Dept: ORTHOPEDICS CLINIC | Facility: CLINIC | Age: 53
End: 2018-03-26

## 2018-03-26 ENCOUNTER — HOSPITAL ENCOUNTER (OUTPATIENT)
Dept: MRI IMAGING | Age: 53
Discharge: HOME | End: 2018-03-26
Attending: ORTHOPAEDIC SURGERY
Payer: COMMERCIAL

## 2018-03-26 DIAGNOSIS — M25.512 CHRONIC LEFT SHOULDER PAIN: ICD-10-CM

## 2018-03-26 DIAGNOSIS — M75.22 BICEPS TENDONITIS ON LEFT: ICD-10-CM

## 2018-03-26 DIAGNOSIS — IMO0002 DISORDER OF ROTATOR CUFF SYNDROME OF LEFT SHOULDER AND ALLIED DISORDER: ICD-10-CM

## 2018-03-26 DIAGNOSIS — G89.29 CHRONIC LEFT SHOULDER PAIN: ICD-10-CM

## 2018-03-26 PROCEDURE — 73221 MRI JOINT UPR EXTREM W/O DYE: CPT | Performed by: ORTHOPAEDIC SURGERY

## 2018-03-26 NOTE — TELEPHONE ENCOUNTER
Dr. Young Remedies, this pt just had MRI left shoulder. I see where he is scheduled for surgery with you on 03/30/18 for Left CTR.  Do you want me to double book him to your schedule this week for MRI results of the shoulder or do you want to wait to see him afte

## 2018-03-30 ENCOUNTER — HOSPITAL ENCOUNTER (OUTPATIENT)
Facility: HOSPITAL | Age: 53
Setting detail: HOSPITAL OUTPATIENT SURGERY
Discharge: HOME | End: 2018-03-30
Attending: ORTHOPAEDIC SURGERY | Admitting: ORTHOPAEDIC SURGERY
Payer: COMMERCIAL

## 2018-03-30 ENCOUNTER — SURGERY (OUTPATIENT)
Age: 53
End: 2018-03-30

## 2018-03-30 ENCOUNTER — ANESTHESIA EVENT (OUTPATIENT)
Dept: SURGERY | Facility: HOSPITAL | Age: 53
End: 2018-03-30
Payer: COMMERCIAL

## 2018-03-30 ENCOUNTER — ANESTHESIA (OUTPATIENT)
Dept: SURGERY | Facility: HOSPITAL | Age: 53
End: 2018-03-30
Payer: COMMERCIAL

## 2018-03-30 VITALS
HEIGHT: 68 IN | TEMPERATURE: 97 F | HEART RATE: 60 BPM | DIASTOLIC BLOOD PRESSURE: 95 MMHG | WEIGHT: 255 LBS | SYSTOLIC BLOOD PRESSURE: 149 MMHG | BODY MASS INDEX: 38.65 KG/M2 | RESPIRATION RATE: 14 BRPM | OXYGEN SATURATION: 97 %

## 2018-03-30 DIAGNOSIS — G56.02 CARPAL TUNNEL SYNDROME ON LEFT: ICD-10-CM

## 2018-03-30 PROCEDURE — 01N50ZZ RELEASE MEDIAN NERVE, OPEN APPROACH: ICD-10-PCS | Performed by: ORTHOPAEDIC SURGERY

## 2018-03-30 RX ORDER — HYDROCODONE BITARTRATE AND ACETAMINOPHEN 5; 325 MG/1; MG/1
1 TABLET ORAL AS NEEDED
Status: DISCONTINUED | OUTPATIENT
Start: 2018-03-30 | End: 2018-03-30

## 2018-03-30 RX ORDER — HYDROCODONE BITARTRATE AND ACETAMINOPHEN 5; 325 MG/1; MG/1
1-2 TABLET ORAL EVERY 6 HOURS PRN
Qty: 20 TABLET | Refills: 0 | Status: SHIPPED | OUTPATIENT
Start: 2018-03-30 | End: 2018-07-12

## 2018-03-30 RX ORDER — MIDAZOLAM HYDROCHLORIDE 1 MG/ML
INJECTION INTRAMUSCULAR; INTRAVENOUS AS NEEDED
Status: DISCONTINUED | OUTPATIENT
Start: 2018-03-30 | End: 2018-03-30 | Stop reason: SURG

## 2018-03-30 RX ORDER — ONDANSETRON 2 MG/ML
4 INJECTION INTRAMUSCULAR; INTRAVENOUS EVERY 6 HOURS PRN
Status: CANCELLED | OUTPATIENT
Start: 2018-03-30

## 2018-03-30 RX ORDER — BUPIVACAINE HYDROCHLORIDE 5 MG/ML
INJECTION, SOLUTION EPIDURAL; INTRACAUDAL AS NEEDED
Status: DISCONTINUED | OUTPATIENT
Start: 2018-03-30 | End: 2018-03-30 | Stop reason: HOSPADM

## 2018-03-30 RX ORDER — HYDROMORPHONE HYDROCHLORIDE 1 MG/ML
0.4 INJECTION, SOLUTION INTRAMUSCULAR; INTRAVENOUS; SUBCUTANEOUS EVERY 5 MIN PRN
Status: DISCONTINUED | OUTPATIENT
Start: 2018-03-30 | End: 2018-03-30

## 2018-03-30 RX ORDER — MORPHINE SULFATE 2 MG/ML
2 INJECTION, SOLUTION INTRAMUSCULAR; INTRAVENOUS EVERY 10 MIN PRN
Status: DISCONTINUED | OUTPATIENT
Start: 2018-03-30 | End: 2018-03-30

## 2018-03-30 RX ORDER — FAMOTIDINE 20 MG/1
20 TABLET ORAL ONCE
Status: COMPLETED | OUTPATIENT
Start: 2018-03-30 | End: 2018-03-30

## 2018-03-30 RX ORDER — HYDROCODONE BITARTRATE AND ACETAMINOPHEN 5; 325 MG/1; MG/1
2 TABLET ORAL AS NEEDED
Status: DISCONTINUED | OUTPATIENT
Start: 2018-03-30 | End: 2018-03-30

## 2018-03-30 RX ORDER — HALOPERIDOL 5 MG/ML
0.25 INJECTION INTRAMUSCULAR ONCE AS NEEDED
Status: DISCONTINUED | OUTPATIENT
Start: 2018-03-30 | End: 2018-03-30

## 2018-03-30 RX ORDER — HYDROMORPHONE HYDROCHLORIDE 1 MG/ML
0.2 INJECTION, SOLUTION INTRAMUSCULAR; INTRAVENOUS; SUBCUTANEOUS EVERY 5 MIN PRN
Status: DISCONTINUED | OUTPATIENT
Start: 2018-03-30 | End: 2018-03-30

## 2018-03-30 RX ORDER — ACETAMINOPHEN 500 MG
1000 TABLET ORAL ONCE
Status: COMPLETED | OUTPATIENT
Start: 2018-03-30 | End: 2018-03-30

## 2018-03-30 RX ORDER — CEFAZOLIN SODIUM/WATER 2 G/20 ML
2 SYRINGE (ML) INTRAVENOUS ONCE
Status: COMPLETED | OUTPATIENT
Start: 2018-03-30 | End: 2018-03-30

## 2018-03-30 RX ORDER — LIDOCAINE HYDROCHLORIDE 10 MG/ML
INJECTION, SOLUTION EPIDURAL; INFILTRATION; INTRACAUDAL; PERINEURAL AS NEEDED
Status: DISCONTINUED | OUTPATIENT
Start: 2018-03-30 | End: 2018-03-30 | Stop reason: HOSPADM

## 2018-03-30 RX ORDER — SODIUM CHLORIDE, SODIUM LACTATE, POTASSIUM CHLORIDE, CALCIUM CHLORIDE 600; 310; 30; 20 MG/100ML; MG/100ML; MG/100ML; MG/100ML
INJECTION, SOLUTION INTRAVENOUS CONTINUOUS
Status: DISCONTINUED | OUTPATIENT
Start: 2018-03-30 | End: 2018-03-30

## 2018-03-30 RX ORDER — MORPHINE SULFATE 4 MG/ML
4 INJECTION, SOLUTION INTRAMUSCULAR; INTRAVENOUS EVERY 10 MIN PRN
Status: DISCONTINUED | OUTPATIENT
Start: 2018-03-30 | End: 2018-03-30

## 2018-03-30 RX ORDER — MORPHINE SULFATE 10 MG/ML
6 INJECTION, SOLUTION INTRAMUSCULAR; INTRAVENOUS EVERY 10 MIN PRN
Status: DISCONTINUED | OUTPATIENT
Start: 2018-03-30 | End: 2018-03-30

## 2018-03-30 RX ORDER — ONDANSETRON 2 MG/ML
4 INJECTION INTRAMUSCULAR; INTRAVENOUS ONCE AS NEEDED
Status: DISCONTINUED | OUTPATIENT
Start: 2018-03-30 | End: 2018-03-30

## 2018-03-30 RX ORDER — METOCLOPRAMIDE 10 MG/1
10 TABLET ORAL ONCE
Status: COMPLETED | OUTPATIENT
Start: 2018-03-30 | End: 2018-03-30

## 2018-03-30 RX ORDER — NALOXONE HYDROCHLORIDE 0.4 MG/ML
80 INJECTION, SOLUTION INTRAMUSCULAR; INTRAVENOUS; SUBCUTANEOUS AS NEEDED
Status: DISCONTINUED | OUTPATIENT
Start: 2018-03-30 | End: 2018-03-30

## 2018-03-30 RX ORDER — LIDOCAINE HYDROCHLORIDE 10 MG/ML
INJECTION, SOLUTION EPIDURAL; INFILTRATION; INTRACAUDAL; PERINEURAL AS NEEDED
Status: DISCONTINUED | OUTPATIENT
Start: 2018-03-30 | End: 2018-03-30 | Stop reason: SURG

## 2018-03-30 RX ORDER — HYDROMORPHONE HYDROCHLORIDE 1 MG/ML
0.6 INJECTION, SOLUTION INTRAMUSCULAR; INTRAVENOUS; SUBCUTANEOUS EVERY 5 MIN PRN
Status: DISCONTINUED | OUTPATIENT
Start: 2018-03-30 | End: 2018-03-30

## 2018-03-30 RX ADMIN — SODIUM CHLORIDE, SODIUM LACTATE, POTASSIUM CHLORIDE, CALCIUM CHLORIDE: 600; 310; 30; 20 INJECTION, SOLUTION INTRAVENOUS at 15:09:00

## 2018-03-30 RX ADMIN — LIDOCAINE HYDROCHLORIDE 50 MG: 10 INJECTION, SOLUTION EPIDURAL; INFILTRATION; INTRACAUDAL; PERINEURAL at 15:13:00

## 2018-03-30 RX ADMIN — MIDAZOLAM HYDROCHLORIDE 2 MG: 1 INJECTION INTRAMUSCULAR; INTRAVENOUS at 15:09:00

## 2018-03-30 RX ADMIN — CEFAZOLIN SODIUM/WATER 2 G: 2 G/20 ML SYRINGE (ML) INTRAVENOUS at 15:16:00

## 2018-03-30 NOTE — H&P
Justine Derik  11/20/1965  50year old   male  Bubba Nassar MD     HPI:   Patient presents with:  Carpal Tunnel Syndrome: Left f/u - was seen at [facility] in 7/2017 and he got an injection - pain got back and dang numbness is more pronounced for the past m KEATONT L/S 1 LEV Right      Comment: Procedure: FACET INJECTION UNDER FLUOROSCOPY;                Surgeon: Matilde Christina DO;  Location: [address] [place] [address]  9/15/2014: JILL BY SABRINA FLORES [facility] [place] Right      Comment: Procedure: external rotation of 90 degrees and internal rotation of 30 degrees on the opposite side. The patient has 5/5 strength in elevation, external rotation, and internal rotation.   Rotator cuff testing is negative with a negative Luisa sign, external rotation l anesthetic complications including death. The patient consented to the procedure. He would like an MRI of his left shoulder to evaluate for rotator cuff tear.     All of their questions were answered and they are in agreement with the treatment plan.

## 2018-03-30 NOTE — OPERATIVE REPORT
[facility]    PATIENT'S NAME: Cinthya Benavides   ATTENDING PHYSICIAN: Pardeep Hoffman MD   OPERATING PHYSICIAN: Pardeep Hoffman MD   PATIENT ACCOUNT#:   [id number]    LOCATION:  [facility] [address] PACU [address] [place] [address]  MEDICAL RECORD #:   [id number] local anesthesia with 1% lidocaine and 0.5% Marcaine in a 1:1 mixture was placed at the planned site of incision in the form of a median nerve block. He was given Ancef as antibiotic prophylaxis within 1 hour of incision time.   His left upper extremity wa

## 2018-03-30 NOTE — ANESTHESIA PREPROCEDURE EVALUATION
Anesthesia PreOp Note    HPI:     Helga Mccurdy is a 50year old male who presents for preoperative consultation requested by: Dean Harris MD    Date of Surgery: 3/30/2018    Procedure(s):  WRIST CARPAL TUNNEL RELEASE  Indication: Carpal tunnel sy History:  2016: BACK SURGERY      Comment: hardware removed  2015: BACK SURGERY      Comment: Shabbir put in L3 L4  04/2017: BACK SURGERY      Comment: C4-5-6 fusion  No date: CHOLECYSTECTOMY  07/19/2012: ELECTROCARDIOGRAM, COMPLETE      Comment: scanned to me status: Never Smoker    Smokeless tobacco: Never Used    Alcohol use Yes  0.0 oz/week     Comment: occasionally    Drug use: Yes 7 times per week     Types: Cannabis    Comment: \"medical marilinda\"    Sexual activity: Not on file     Other Topics Concer

## 2018-03-30 NOTE — BRIEF OP NOTE
Pre-Operative Diagnosis: Carpal tunnel syndrome on left [G56.02]     Post-Operative Diagnosis: Carpal tunnel syndrome on left [G56.02]      Procedure Performed:   Procedure(s):  LEFT CARPAL TUNNEL RELEASE    Surgeon(s) and Role:     Vance Kaur MD

## 2018-03-30 NOTE — ANESTHESIA POSTPROCEDURE EVALUATION
Patient: Deepa Ordoñez    Procedure Summary     Date:  03/30/18 Room / Location:  [address] [place] [address] MAIN OR 10 / [address] [place] [address] MAIN OR    Anesthesia Start:  9993 Anesthesia Stop:  8086    Procedure:  WRIST CARPAL TUNNEL RELEASE (Left ) Diagnosis:       Carpal tunnel syndrome o

## 2018-04-02 ENCOUNTER — TELEPHONE (OUTPATIENT)
Dept: ORTHOPEDICS CLINIC | Facility: CLINIC | Age: 53
End: 2018-04-02

## 2018-04-12 ENCOUNTER — OFFICE VISIT (OUTPATIENT)
Dept: ORTHOPEDICS CLINIC | Facility: CLINIC | Age: 53
End: 2018-04-12

## 2018-04-12 VITALS — DIASTOLIC BLOOD PRESSURE: 88 MMHG | SYSTOLIC BLOOD PRESSURE: 143 MMHG | RESPIRATION RATE: 16 BRPM | HEART RATE: 94 BPM

## 2018-04-12 DIAGNOSIS — Z47.89 AFTERCARE FOLLOWING SURGERY OF THE MUSCULOSKELETAL SYSTEM: Primary | ICD-10-CM

## 2018-04-12 DIAGNOSIS — M75.82 ROTATOR CUFF TENDONITIS, LEFT: ICD-10-CM

## 2018-04-12 PROCEDURE — 99212 OFFICE O/P EST SF 10 MIN: CPT | Performed by: ORTHOPAEDIC SURGERY

## 2018-04-12 PROCEDURE — 99024 POSTOP FOLLOW-UP VISIT: CPT | Performed by: ORTHOPAEDIC SURGERY

## 2018-04-12 RX ORDER — PREDNISONE 20 MG/1
20 TABLET ORAL DAILY
Qty: 5 TABLET | Refills: 0 | Status: SHIPPED | OUTPATIENT
Start: 2018-04-12 | End: 2018-04-17

## 2018-04-18 NOTE — PROGRESS NOTES
4/12/2018  Beatriz Alejadnrochie  11/20/1965  50year old   male  Annamarie Hong MD    HPI:   Patient presents with:  Post-Op: Left CTR -1st visit - sx 3/30/18 - states he has a little pain rated as 3/10 on and off.   Shoulder Pain: Left f/u and MRI results - side.  The patient has 5/5 strength in elevation, external rotation, and internal rotation.   Rotator cuff testing is negative with a negative Luisa sign, external rotation lag sign, Hornblower's sign, lift off test, and belly press test.  The patient has a

## 2018-06-05 RX ORDER — VALSARTAN AND HYDROCHLOROTHIAZIDE 160; 12.5 MG/1; MG/1
1 TABLET, FILM COATED ORAL
Qty: 30 TABLET | Refills: 0 | Status: SHIPPED | OUTPATIENT
Start: 2018-06-05 | End: 2018-07-12

## 2018-07-12 ENCOUNTER — OFFICE VISIT (OUTPATIENT)
Dept: FAMILY MEDICINE CLINIC | Facility: CLINIC | Age: 53
End: 2018-07-12

## 2018-07-12 VITALS
BODY MASS INDEX: 39 KG/M2 | WEIGHT: 258.63 LBS | SYSTOLIC BLOOD PRESSURE: 146 MMHG | DIASTOLIC BLOOD PRESSURE: 100 MMHG | HEART RATE: 73 BPM

## 2018-07-12 DIAGNOSIS — M79.641 RIGHT HAND PAIN: ICD-10-CM

## 2018-07-12 DIAGNOSIS — Z12.5 PROSTATE CANCER SCREENING: ICD-10-CM

## 2018-07-12 DIAGNOSIS — I10 ESSENTIAL HYPERTENSION: ICD-10-CM

## 2018-07-12 PROCEDURE — 99212 OFFICE O/P EST SF 10 MIN: CPT | Performed by: FAMILY MEDICINE

## 2018-07-12 PROCEDURE — 99214 OFFICE O/P EST MOD 30 MIN: CPT | Performed by: FAMILY MEDICINE

## 2018-07-12 RX ORDER — VALSARTAN AND HYDROCHLOROTHIAZIDE 160; 12.5 MG/1; MG/1
1 TABLET, FILM COATED ORAL
Qty: 90 TABLET | Refills: 3 | Status: SHIPPED | OUTPATIENT
Start: 2018-07-12 | End: 2019-07-04

## 2018-07-12 RX ORDER — AMLODIPINE BESYLATE 5 MG/1
5 TABLET ORAL DAILY
Qty: 30 TABLET | Refills: 2 | Status: SHIPPED | OUTPATIENT
Start: 2018-07-12 | End: 2018-10-02

## 2018-07-12 NOTE — PROGRESS NOTES
HPI:    Patient ID: Danilo Khan is a 50year old male. Patient is here for follow up for chronic medical issues- HTN . The patient is compliant with medications and no side effects. There are no acute issues and patient is requesting refills.  The p PSA blood testing;     Right hand pain:  - After discussion, will send to Dr Heraclio Cope for further evaluation and treatment; To call if any significant symptoms.          Orders Placed This Encounter      Lipid Panel [E]      PSA (Screening) [E]      Comp

## 2018-07-16 ENCOUNTER — PATIENT MESSAGE (OUTPATIENT)
Dept: FAMILY MEDICINE CLINIC | Facility: CLINIC | Age: 53
End: 2018-07-16

## 2018-07-17 NOTE — TELEPHONE ENCOUNTER
From: Indy Hinkle  To: Mayuri Brady MD  Sent: 7/16/2018 6:58 PM CDT  Subject: Prescription Question    I read today that the FDA has a recall on Valsartan-HCTZ. Do I need to change medicine?     Constanza Burgess

## 2018-07-24 ENCOUNTER — APPOINTMENT (OUTPATIENT)
Dept: LAB | Age: 53
End: 2018-07-24
Attending: FAMILY MEDICINE
Payer: COMMERCIAL

## 2018-07-24 DIAGNOSIS — I10 ESSENTIAL HYPERTENSION: ICD-10-CM

## 2018-07-24 DIAGNOSIS — Z12.5 PROSTATE CANCER SCREENING: ICD-10-CM

## 2018-07-24 LAB
ALBUMIN SERPL BCP-MCNC: 4 G/DL (ref 3.5–4.8)
ALBUMIN/GLOB SERPL: 1.4 {RATIO} (ref 1–2)
ALP SERPL-CCNC: 25 U/L (ref 32–100)
ALT SERPL-CCNC: 27 U/L (ref 17–63)
ANION GAP SERPL CALC-SCNC: 9 MMOL/L (ref 0–18)
AST SERPL-CCNC: 22 U/L (ref 15–41)
BILIRUB SERPL-MCNC: 1.7 MG/DL (ref 0.3–1.2)
BUN SERPL-MCNC: 13 MG/DL (ref 8–20)
BUN/CREAT SERPL: 12.9 (ref 10–20)
CALCIUM SERPL-MCNC: 9.5 MG/DL (ref 8.5–10.5)
CHLORIDE SERPL-SCNC: 105 MMOL/L (ref 95–110)
CHOLEST SERPL-MCNC: 245 MG/DL (ref 110–200)
CO2 SERPL-SCNC: 25 MMOL/L (ref 22–32)
CREAT SERPL-MCNC: 1.01 MG/DL (ref 0.5–1.5)
ERYTHROCYTE [DISTWIDTH] IN BLOOD BY AUTOMATED COUNT: 13.5 % (ref 11–15)
GLOBULIN PLAS-MCNC: 2.9 G/DL (ref 2.5–3.7)
GLUCOSE SERPL-MCNC: 104 MG/DL (ref 70–99)
HCT VFR BLD AUTO: 43.9 % (ref 41–52)
HDLC SERPL-MCNC: 39 MG/DL
HGB BLD-MCNC: 15.1 G/DL (ref 13.5–17.5)
LDLC SERPL CALC-MCNC: 143 MG/DL (ref 0–99)
MCH RBC QN AUTO: 31.8 PG (ref 27–32)
MCHC RBC AUTO-ENTMCNC: 34.4 G/DL (ref 32–37)
MCV RBC AUTO: 92.5 FL (ref 80–100)
NONHDLC SERPL-MCNC: 206 MG/DL
OSMOLALITY UR CALC.SUM OF ELEC: 288 MOSM/KG (ref 275–295)
PATIENT FASTING: YES
PLATELET # BLD AUTO: 304 K/UL (ref 140–400)
PMV BLD AUTO: 8.1 FL (ref 7.4–10.3)
POTASSIUM SERPL-SCNC: 3.8 MMOL/L (ref 3.3–5.1)
PROT SERPL-MCNC: 6.9 G/DL (ref 5.9–8.4)
PSA SERPL-MCNC: 0.4 NG/ML (ref 0–4)
RBC # BLD AUTO: 4.75 M/UL (ref 4.5–5.9)
SODIUM SERPL-SCNC: 139 MMOL/L (ref 136–144)
TRIGL SERPL-MCNC: 317 MG/DL (ref 1–149)
WBC # BLD AUTO: 8.4 K/UL (ref 4–11)

## 2018-07-24 PROCEDURE — 85027 COMPLETE CBC AUTOMATED: CPT

## 2018-07-24 PROCEDURE — 36415 COLL VENOUS BLD VENIPUNCTURE: CPT

## 2018-07-24 PROCEDURE — 80053 COMPREHEN METABOLIC PANEL: CPT

## 2018-07-24 PROCEDURE — 80061 LIPID PANEL: CPT

## 2018-10-02 RX ORDER — AMLODIPINE BESYLATE 5 MG/1
TABLET ORAL
Qty: 90 TABLET | Refills: 0 | Status: SHIPPED | OUTPATIENT
Start: 2018-10-02 | End: 2018-11-30

## 2018-10-02 NOTE — TELEPHONE ENCOUNTER
Requested Prescriptions     Signed Prescriptions Disp Refills   • AMLODIPINE BESYLATE 5 MG Oral Tab 90 tablet 0     Sig: TAKE 1 TABLET BY MOUTH EVERY DAY     Authorizing Provider: Luis Enrique LAMAR     Ordering User: Carli Servin         Refill approved per

## 2018-10-11 ENCOUNTER — HOSPITAL ENCOUNTER (OUTPATIENT)
Age: 53
Discharge: HOME | End: 2018-10-11
Attending: EMERGENCY MEDICINE
Payer: COMMERCIAL

## 2018-10-11 ENCOUNTER — APPOINTMENT (OUTPATIENT)
Dept: GENERAL RADIOLOGY | Age: 53
End: 2018-10-11
Attending: EMERGENCY MEDICINE
Payer: COMMERCIAL

## 2018-10-11 VITALS
BODY MASS INDEX: 36.37 KG/M2 | SYSTOLIC BLOOD PRESSURE: 156 MMHG | WEIGHT: 240 LBS | HEIGHT: 68 IN | DIASTOLIC BLOOD PRESSURE: 89 MMHG | RESPIRATION RATE: 18 BRPM | HEART RATE: 82 BPM | TEMPERATURE: 98 F | OXYGEN SATURATION: 99 %

## 2018-10-11 DIAGNOSIS — S46.912A STRAIN OF LEFT SHOULDER, INITIAL ENCOUNTER: Primary | ICD-10-CM

## 2018-10-11 PROCEDURE — 99213 OFFICE O/P EST LOW 20 MIN: CPT

## 2018-10-11 PROCEDURE — 73030 X-RAY EXAM OF SHOULDER: CPT | Performed by: EMERGENCY MEDICINE

## 2018-10-11 NOTE — ED INITIAL ASSESSMENT (HPI)
PATIENT ARRIVED AMBULATORY TO ROOM C/O LEFT SHOULDER PAIN. PATIENT STATES \"I SAW LA CYNTHIA IN [facility] FOR THIS. SHE DID AN MRI AND I HAVE A PARTIAL ROTATOR CUFF TEAR. SHE PUT ME ON STEROIDS AND THAT HAS BEEN WORKING UNTIL A COUPLE DAYS AGO.  I MADE AN APPOIN

## 2018-10-11 NOTE — ED PROVIDER NOTES
Patient Seen in: [address]    History   Patient presents with:  Shoulder Pain    Stated Complaint: L Shoulder Pain    HPI    Chey Licea is a 82-year-old male who presents to the urgent care with a chief complaint of left naye tobacco: Never Used    Alcohol use:  Yes      Alcohol/week: 0.0 oz      Comment: occasionally    Drug use: Yes      Frequency: 7.0 times per week      Types: Cannabis      Comment: \"medical marijuanna\"      Review of Systems    Positive for stated complai Medication List

## 2018-10-26 ENCOUNTER — OFFICE VISIT (OUTPATIENT)
Dept: PHYSICAL THERAPY | Age: 53
End: 2018-10-26
Attending: ORTHOPAEDIC SURGERY
Payer: COMMERCIAL

## 2018-10-26 DIAGNOSIS — G89.29 CHRONIC LEFT SHOULDER PAIN: ICD-10-CM

## 2018-10-26 DIAGNOSIS — M75.82 ROTATOR CUFF TENDONITIS, LEFT: ICD-10-CM

## 2018-10-26 DIAGNOSIS — M25.512 CHRONIC LEFT SHOULDER PAIN: ICD-10-CM

## 2018-10-26 PROCEDURE — 97162 PT EVAL MOD COMPLEX 30 MIN: CPT

## 2018-10-26 PROCEDURE — 97140 MANUAL THERAPY 1/> REGIONS: CPT

## 2018-10-26 NOTE — PROGRESS NOTES
UPPER EXTREMITY EVALUATION:   Referring Physician: Dr. Lor Durand  Diagnosis: Chronic left shoulder pain (M25.512,G89.29)  Rotator cuff tendonitis, left (M75.82)      Date of Onset: A couple years ago; worse in March Date of Service: 10/26/2018     JOSEFINA (attention span at work because is always trying to mentally manage pain), range of motion (uncomfortable)     Graham Kong describes prior level of function: pain-free function    Pt goals include: get this solved.     Past medical history was reviewed with [facility] Flexion: R 171; L 130  Extension: R 72;  L 54  Abduction: R 172; L 95  ER: R T4; L T4  IR: R T12; L T7 Flexion: WNL  Extension: WNL       PROM:  Shoulder    Flexion: L: 160  Abduction: 160     Strength/MMT:  Shoulder   Flexion: R 5/5; L 4+/5  Abduction: R demo improved postural awareness with improved scapulohumeral mechanics to be able to lift/carry with increased ease and minimize risk for re-injury    Frequency / Duration: Patient will be seen for 2 x/week or a total of 8 visits over a 90 day period.  Yobany

## 2018-10-31 ENCOUNTER — OFFICE VISIT (OUTPATIENT)
Dept: PHYSICAL THERAPY | Age: 53
End: 2018-10-31
Attending: ORTHOPAEDIC SURGERY
Payer: COMMERCIAL

## 2018-10-31 PROCEDURE — 97110 THERAPEUTIC EXERCISES: CPT

## 2018-10-31 NOTE — PROGRESS NOTES
Chronic left shoulder pain (M25.512,G89.29)  Rotator cuff tendonitis, left (M75.82)  Authorized # of Visits:  8         Next MD visit: none scheduled  Fall Risk: standard         Precautions: n/a           Medication Changes since last visit?: No  Subjecti will demo improved R shoulder AROM to [facility] to be able to raise his arm overhead  3.  Pt will demo improved R shoulder and scapular strength to at least 4/5 for all deficit motions for increased ease with lifting/carrying and performance work and household ta

## 2018-11-05 NOTE — PROGRESS NOTES
Chronic left shoulder pain (M25.512,G89.29)  Rotator cuff tendonitis, left (M75.82)  Authorized # of Visits:  8         Next MD visit: none scheduled  Fall Risk: standard         Precautions: n/a           Medication Changes since last visit?: No  Subjecti independent management of symptoms at discharge. 2. Pt will demo improved R shoulder AROM to [facility] to be able to raise his arm overhead  3.  Pt will demo improved R shoulder and scapular strength to at least 4/5 for all deficit motions for increased ease wit

## 2018-11-07 ENCOUNTER — OFFICE VISIT (OUTPATIENT)
Dept: PHYSICAL THERAPY | Age: 53
End: 2018-11-07
Attending: ORTHOPAEDIC SURGERY
Payer: COMMERCIAL

## 2018-11-07 PROCEDURE — 97110 THERAPEUTIC EXERCISES: CPT

## 2018-11-07 NOTE — PROGRESS NOTES
Chronic left shoulder pain (M25.512,G89.29)  Rotator cuff tendonitis, left (M75.82)  Authorized # of Visits:  8   (PPO)      Next MD visit: none scheduled  Fall Risk: standard         Precautions: n/a           Medication Changes since last visit?: No  Sub Advised pt on stretching every day and strengthening every other day.     Current HEP: postural correction throughout the day, low trap shoulder extensions RTB, resisted ER/IR with RTB, doorway pec stretch and wall slides into flexion     Plan: Assess effe

## 2018-11-08 ENCOUNTER — TELEPHONE (OUTPATIENT)
Dept: PHYSICAL THERAPY | Age: 53
End: 2018-11-08

## 2018-11-08 PROBLEM — M75.42 INTERNAL IMPINGEMENT OF LEFT SHOULDER: Status: ACTIVE | Noted: 2018-11-08

## 2018-11-08 PROBLEM — M75.112 INCOMPLETE TEAR OF LEFT ROTATOR CUFF: Status: ACTIVE | Noted: 2018-11-08

## 2018-11-13 ENCOUNTER — APPOINTMENT (OUTPATIENT)
Dept: PHYSICAL THERAPY | Age: 53
End: 2018-11-13
Attending: FAMILY MEDICINE
Payer: COMMERCIAL

## 2018-11-16 ENCOUNTER — APPOINTMENT (OUTPATIENT)
Dept: PHYSICAL THERAPY | Age: 53
End: 2018-11-16
Attending: FAMILY MEDICINE
Payer: COMMERCIAL

## 2018-11-21 ENCOUNTER — APPOINTMENT (OUTPATIENT)
Dept: PHYSICAL THERAPY | Age: 53
End: 2018-11-21
Attending: FAMILY MEDICINE
Payer: COMMERCIAL

## 2018-11-23 ENCOUNTER — APPOINTMENT (OUTPATIENT)
Dept: LAB | Age: 53
End: 2018-11-23
Attending: ORTHOPAEDIC SURGERY
Payer: COMMERCIAL

## 2018-11-23 ENCOUNTER — LAB ENCOUNTER (OUTPATIENT)
Dept: LAB | Age: 53
End: 2018-11-23
Attending: ORTHOPAEDIC SURGERY
Payer: COMMERCIAL

## 2018-11-23 DIAGNOSIS — Z01.818 PREOP EXAMINATION: ICD-10-CM

## 2018-11-23 DIAGNOSIS — Z01.810 PRE-OPERATIVE CARDIOVASCULAR EXAMINATION: Primary | ICD-10-CM

## 2018-11-23 PROCEDURE — 80048 BASIC METABOLIC PNL TOTAL CA: CPT

## 2018-11-23 PROCEDURE — 93010 ELECTROCARDIOGRAM REPORT: CPT | Performed by: ORTHOPAEDIC SURGERY

## 2018-11-23 PROCEDURE — 93005 ELECTROCARDIOGRAM TRACING: CPT

## 2018-11-23 PROCEDURE — 36415 COLL VENOUS BLD VENIPUNCTURE: CPT

## 2018-11-27 ENCOUNTER — APPOINTMENT (OUTPATIENT)
Dept: PHYSICAL THERAPY | Age: 53
End: 2018-11-27
Attending: FAMILY MEDICINE
Payer: COMMERCIAL

## 2018-12-19 RX ORDER — AMLODIPINE BESYLATE 5 MG/1
TABLET ORAL
Qty: 90 TABLET | Refills: 1 | Status: SHIPPED | OUTPATIENT
Start: 2018-12-19 | End: 2019-07-05

## 2019-01-02 PROCEDURE — 99283 EMERGENCY DEPT VISIT LOW MDM: CPT

## 2019-01-03 ENCOUNTER — HOSPITAL ENCOUNTER (EMERGENCY)
Facility: HOSPITAL | Age: 54
Discharge: HOME | End: 2019-01-03
Attending: EMERGENCY MEDICINE
Payer: COMMERCIAL

## 2019-01-03 VITALS
TEMPERATURE: 99 F | SYSTOLIC BLOOD PRESSURE: 165 MMHG | OXYGEN SATURATION: 97 % | DIASTOLIC BLOOD PRESSURE: 110 MMHG | RESPIRATION RATE: 17 BRPM | HEART RATE: 90 BPM

## 2019-01-03 DIAGNOSIS — H10.31 ACUTE CONJUNCTIVITIS OF RIGHT EYE, UNSPECIFIED ACUTE CONJUNCTIVITIS TYPE: Primary | ICD-10-CM

## 2019-01-03 RX ORDER — TETRACAINE HYDROCHLORIDE 5 MG/ML
1 SOLUTION OPHTHALMIC ONCE
Status: COMPLETED | OUTPATIENT
Start: 2019-01-03 | End: 2019-01-03

## 2019-01-03 RX ORDER — PURIFIED WATER 986 MG/ML
2 SOLUTION OPHTHALMIC AS NEEDED
Status: DISCONTINUED | OUTPATIENT
Start: 2019-01-03 | End: 2019-01-03

## 2019-01-03 NOTE — ED PROVIDER NOTES
Patient Seen in: [facility] AND [facility] Emergency Department    History   Patient presents with:   Eye Visual Problem (opthalmic)    Stated Complaint: right eye pain/blurriness    HPI    Patient is a 74-year-old male who states he woke up this morning with rig Use      Smoking status: Never Smoker      Smokeless tobacco: Never Used    Alcohol use:  Yes      Alcohol/week: 0.0 oz      Comment: occasionally    Drug use: Yes      Frequency: 7.0 times per week      Types: Cannabis      Comment: \"medical [facility]\" List    START taking these medications    Tobramycin Sulfate 0.3 % Ophthalmic Ointment  Apply 1 Application to eye 3 (three) times daily for 5 days.   Qty: 3.5 g Refills: 0

## 2019-01-03 NOTE — ED INITIAL ASSESSMENT (HPI)
Patient presents with pain to his right eye that started at approximately 1000 this morning. Patient states vision is starting to get blurry as well.

## 2019-02-26 ENCOUNTER — OFFICE VISIT (OUTPATIENT)
Dept: FAMILY MEDICINE CLINIC | Facility: CLINIC | Age: 54
End: 2019-02-26
Payer: COMMERCIAL

## 2019-02-26 VITALS
SYSTOLIC BLOOD PRESSURE: 163 MMHG | RESPIRATION RATE: 20 BRPM | OXYGEN SATURATION: 95 % | WEIGHT: 244 LBS | HEART RATE: 83 BPM | TEMPERATURE: 99 F | DIASTOLIC BLOOD PRESSURE: 108 MMHG | BODY MASS INDEX: 37 KG/M2

## 2019-02-26 DIAGNOSIS — J98.01 BRONCHOSPASM: ICD-10-CM

## 2019-02-26 DIAGNOSIS — J06.9 ACUTE URI: ICD-10-CM

## 2019-02-26 PROCEDURE — 99213 OFFICE O/P EST LOW 20 MIN: CPT | Performed by: FAMILY MEDICINE

## 2019-02-26 PROCEDURE — 99212 OFFICE O/P EST SF 10 MIN: CPT | Performed by: FAMILY MEDICINE

## 2019-02-26 RX ORDER — CODEINE PHOSPHATE AND GUAIFENESIN 10; 100 MG/5ML; MG/5ML
5 SOLUTION ORAL EVERY 6 HOURS PRN
Qty: 140 ML | Refills: 0 | Status: SHIPPED | OUTPATIENT
Start: 2019-02-26 | End: 2019-10-09

## 2019-02-26 RX ORDER — AZITHROMYCIN 250 MG/1
TABLET, FILM COATED ORAL
Qty: 6 TABLET | Refills: 0 | Status: SHIPPED | OUTPATIENT
Start: 2019-02-26 | End: 2019-10-09

## 2019-02-26 RX ORDER — ALBUTEROL SULFATE 90 UG/1
2 AEROSOL, METERED RESPIRATORY (INHALATION) EVERY 4 HOURS PRN
Qty: 1 INHALER | Refills: 0 | Status: SHIPPED | OUTPATIENT
Start: 2019-02-26 | End: 2019-10-28

## 2019-02-26 NOTE — PROGRESS NOTES
HPI:    Patient ID: Prema Garcia is a 48year old male. Pt presents with cold and cough symptoms for 1-2 weeks. Pt has had coughing fits and has had wheezing/ SOB. Has had fevers also. Pt has tried otc remedies without relief.  Pt states sick contact Constitutional: He appears well-developed and well-nourished.    HENT:   Right Ear: Tympanic membrane and ear canal normal.   Left Ear: Tympanic membrane and ear canal normal.   Mouth/Throat: Oropharynx is clear and moist.   Eyes: Conjunctivae are normal.

## 2019-05-16 ENCOUNTER — OFFICE VISIT (OUTPATIENT)
Dept: FAMILY MEDICINE CLINIC | Facility: CLINIC | Age: 54
End: 2019-05-16
Payer: COMMERCIAL

## 2019-05-16 VITALS
SYSTOLIC BLOOD PRESSURE: 161 MMHG | RESPIRATION RATE: 80 BRPM | BODY MASS INDEX: 37.89 KG/M2 | HEIGHT: 68 IN | DIASTOLIC BLOOD PRESSURE: 98 MMHG | WEIGHT: 250 LBS | HEART RATE: 80 BPM | TEMPERATURE: 98 F

## 2019-05-16 DIAGNOSIS — M25.511 ACUTE PAIN OF RIGHT SHOULDER: Primary | ICD-10-CM

## 2019-05-16 PROCEDURE — 99212 OFFICE O/P EST SF 10 MIN: CPT | Performed by: FAMILY MEDICINE

## 2019-05-16 PROCEDURE — 99213 OFFICE O/P EST LOW 20 MIN: CPT | Performed by: FAMILY MEDICINE

## 2019-05-16 RX ORDER — HYDROCODONE BITARTRATE AND ACETAMINOPHEN 10; 325 MG/1; MG/1
1 TABLET ORAL EVERY 6 HOURS PRN
Qty: 45 TABLET | Refills: 0 | Status: SHIPPED | OUTPATIENT
Start: 2019-05-16 | End: 2019-06-20 | Stop reason: ALTCHOICE

## 2019-05-16 NOTE — PROGRESS NOTES
HPI:    Patient ID: Juan Ramirez is a 48year old male. Pt presents with sig pain of the right shoulder for about 3 weeks. Pt states this began after using an auger.  Pt states the shoulder feels swollen and similar to his left shoulder when he had a Musculoskeletal:        Right shoulder: He exhibits tenderness and pain. He exhibits normal range of motion and no spasm. Right elbow: He exhibits normal range of motion, no swelling and no effusion. Right shoulder. ROM intact.    Right elbow: no

## 2019-05-17 ENCOUNTER — TELEPHONE (OUTPATIENT)
Dept: FAMILY MEDICINE CLINIC | Facility: CLINIC | Age: 54
End: 2019-05-17

## 2019-05-17 NOTE — TELEPHONE ENCOUNTER
Received fax from [employer], additional information is needed for further processing PA. Clinical notes have been faxed to [phone].

## 2019-05-17 NOTE — TELEPHONE ENCOUNTER
PA for Hydrocodone-Acetaminophen  mg tab completed with [employer] via CMM response time 3-5 business days KEY [id number].

## 2019-05-22 NOTE — TELEPHONE ENCOUNTER
Received fax from [employer]. A temp auth effective 05/16/19-06/16/19 has been entered, additional info needed for a full approval. The request has not been fully approved because of the requested more than 7 days of an immediate release opioid.  Viridiana Marrufo shows that t

## 2019-05-30 NOTE — TELEPHONE ENCOUNTER
PA approved #[id number] days effective 06/17/2019-9/17/2019.  Called patient left detailed message regarding approval.

## 2019-06-20 PROBLEM — M19.011 PRIMARY OSTEOARTHRITIS OF RIGHT SHOULDER: Status: ACTIVE | Noted: 2019-06-20

## 2019-06-20 PROBLEM — M25.511 CHRONIC RIGHT SHOULDER PAIN: Status: ACTIVE | Noted: 2019-06-20

## 2019-06-20 PROBLEM — G89.29 CHRONIC RIGHT SHOULDER PAIN: Status: ACTIVE | Noted: 2019-06-20

## 2019-07-04 RX ORDER — VALSARTAN AND HYDROCHLOROTHIAZIDE 160; 12.5 MG/1; MG/1
TABLET, FILM COATED ORAL
Qty: 90 TABLET | Refills: 1 | Status: SHIPPED | OUTPATIENT
Start: 2019-07-04 | End: 2020-01-01

## 2019-07-04 NOTE — TELEPHONE ENCOUNTER
Refill passed per [facility] protocol.   Hypertensive Medications  Protocol Criteria:  · Appointment scheduled in the past 6 months or in the next 3 months  · BMP or CMP in the past 12 months  · Creatinine result < 2  Recent Outpatient Visits

## 2019-07-05 NOTE — TELEPHONE ENCOUNTER
Dr. Chong Hong please see patient comment. Increased dose pended, but originally ordered as 5mg daily. Please advise. Patient comment: Dr. Chong Hong wants me on 10MG, not 5MG.   I have doubling up on the 5MG tablets

## 2019-07-06 RX ORDER — AMLODIPINE BESYLATE 10 MG/1
10 TABLET ORAL
Qty: 90 TABLET | Refills: 1 | Status: SHIPPED | OUTPATIENT
Start: 2019-07-06 | End: 2019-09-11

## 2019-09-11 ENCOUNTER — E-VISIT (OUTPATIENT)
Dept: FAMILY MEDICINE CLINIC | Facility: CLINIC | Age: 54
End: 2019-09-11

## 2019-09-11 DIAGNOSIS — J01.90 ACUTE SINUSITIS, RECURRENCE NOT SPECIFIED, UNSPECIFIED LOCATION: Primary | ICD-10-CM

## 2019-09-11 PROCEDURE — 98969 ONLINE SERVICE BY HC PRO: CPT | Performed by: NURSE PRACTITIONER

## 2019-09-11 RX ORDER — FLUTICASONE PROPIONATE 50 MCG
2 SPRAY, SUSPENSION (ML) NASAL DAILY
Qty: 1 BOTTLE | Refills: 0 | Status: SHIPPED | OUTPATIENT
Start: 2019-09-11 | End: 2019-10-11

## 2019-09-11 RX ORDER — AMOXICILLIN AND CLAVULANATE POTASSIUM 875; 125 MG/1; MG/1
1 TABLET, FILM COATED ORAL 2 TIMES DAILY
Qty: 20 TABLET | Refills: 0 | Status: SHIPPED | OUTPATIENT
Start: 2019-09-11 | End: 2019-09-21

## 2019-09-11 NOTE — PROGRESS NOTES
Trino Jane is a 48year old male. HPI:   See answers to questions above. Current Outpatient Medications:  Amoxicillin-Pot Clavulanate 875-125 MG Oral Tab Take 1 tablet by mouth 2 (two) times daily for 10 days.  Disp: 20 tablet Rfl: 0   Fluticas by Evelyn Arias MD at [facility]   • ARTHROSCOPY SHOULDER WITH ROTATOR CUFF REPAIR Left 12/7/2018    Performed by Evelyn Arias MD at [address]  2016    hardware removed   • BACK SURGERY  2015    Shabbir p patient instructions

## 2019-10-09 ENCOUNTER — OFFICE VISIT (OUTPATIENT)
Dept: FAMILY MEDICINE CLINIC | Facility: CLINIC | Age: 54
End: 2019-10-09
Payer: COMMERCIAL

## 2019-10-09 VITALS
HEART RATE: 98 BPM | RESPIRATION RATE: 20 BRPM | TEMPERATURE: 98 F | SYSTOLIC BLOOD PRESSURE: 171 MMHG | DIASTOLIC BLOOD PRESSURE: 88 MMHG | BODY MASS INDEX: 37.44 KG/M2 | WEIGHT: 247 LBS | HEIGHT: 68 IN

## 2019-10-09 DIAGNOSIS — Z87.39 HISTORY OF HERNIATED INTERVERTEBRAL DISC: ICD-10-CM

## 2019-10-09 DIAGNOSIS — M54.16 LUMBAR RADICULOPATHY, RIGHT: Primary | ICD-10-CM

## 2019-10-09 DIAGNOSIS — D22.9 CHANGE IN SKIN MOLE: ICD-10-CM

## 2019-10-09 PROCEDURE — 99213 OFFICE O/P EST LOW 20 MIN: CPT | Performed by: FAMILY MEDICINE

## 2019-10-09 RX ORDER — AMLODIPINE BESYLATE 10 MG/1
TABLET ORAL
Refills: 0 | COMMUNITY
Start: 2019-10-03 | End: 2019-10-09

## 2019-10-09 NOTE — PROGRESS NOTES
HPI:    Patient ID: Bobby Gleason is a 48year old male. Pt presents with hx of multiple back fusions for his back. Pt has had pain of the hip area since his back surgery. Pt has had a procedure where he had hardware of the back about 3 years ago. call if worse or not better; ER if any sig symptoms. Will check MRI of lumbar spine; Follow up and further management after testing. To follow up with Dr. Stephanie Schmidt after MRI.     Change in skin mole: right abdomen  - After discussion, will send to dermatology f

## 2019-11-02 PROBLEM — M54.16 RIGHT LUMBAR RADICULOPATHY: Status: ACTIVE | Noted: 2019-11-02

## 2019-11-16 ENCOUNTER — OFFICE VISIT (OUTPATIENT)
Dept: FAMILY MEDICINE CLINIC | Facility: CLINIC | Age: 54
End: 2019-11-16
Payer: COMMERCIAL

## 2019-11-16 VITALS
TEMPERATURE: 99 F | HEIGHT: 68 IN | SYSTOLIC BLOOD PRESSURE: 130 MMHG | RESPIRATION RATE: 20 BRPM | DIASTOLIC BLOOD PRESSURE: 85 MMHG | HEART RATE: 96 BPM | WEIGHT: 244 LBS | BODY MASS INDEX: 36.98 KG/M2

## 2019-11-16 DIAGNOSIS — M51.26 HERNIATED LUMBAR INTERVERTEBRAL DISC: ICD-10-CM

## 2019-11-16 DIAGNOSIS — I10 ESSENTIAL HYPERTENSION: ICD-10-CM

## 2019-11-16 DIAGNOSIS — Z01.818 PREOPERATIVE GENERAL PHYSICAL EXAMINATION: ICD-10-CM

## 2019-11-16 PROCEDURE — 99214 OFFICE O/P EST MOD 30 MIN: CPT | Performed by: FAMILY MEDICINE

## 2019-11-16 NOTE — PROGRESS NOTES
HPI:    Patient ID: Kia Chaudhari is a 48year old male. Patient is here for for preoperative history and physical. The patient will be having back fusion surgery with Dr. Halina Ruth on 12/6/19 at [address] [place] [address]. No acute issues or problems.  Chronic medical problem range of motion. Neck supple. No thyromegaly present. Cardiovascular: Normal rate, regular rhythm, normal heart sounds and intact distal pulses. Pulmonary/Chest: Effort normal and breath sounds normal.   Abdominal: Soft.  Bowel sounds are normal. He exh

## 2019-11-23 ENCOUNTER — APPOINTMENT (OUTPATIENT)
Dept: LAB | Age: 54
End: 2019-11-23
Attending: FAMILY MEDICINE
Payer: COMMERCIAL

## 2019-11-23 ENCOUNTER — LAB ENCOUNTER (OUTPATIENT)
Dept: LAB | Age: 54
End: 2019-11-23
Attending: FAMILY MEDICINE
Payer: COMMERCIAL

## 2019-11-23 DIAGNOSIS — Z01.818 PREOPERATIVE GENERAL PHYSICAL EXAMINATION: ICD-10-CM

## 2019-11-23 DIAGNOSIS — Z01.818 PREOPERATIVE EXAMINATION, UNSPECIFIED: Primary | ICD-10-CM

## 2019-11-23 PROCEDURE — 87641 MR-STAPH DNA AMP PROBE: CPT

## 2019-11-23 PROCEDURE — 80053 COMPREHEN METABOLIC PANEL: CPT

## 2019-11-23 PROCEDURE — 93010 ELECTROCARDIOGRAM REPORT: CPT | Performed by: FAMILY MEDICINE

## 2019-11-23 PROCEDURE — 85027 COMPLETE CBC AUTOMATED: CPT

## 2019-11-23 PROCEDURE — 93005 ELECTROCARDIOGRAM TRACING: CPT

## 2019-11-23 PROCEDURE — 36415 COLL VENOUS BLD VENIPUNCTURE: CPT

## 2019-11-26 ENCOUNTER — TELEPHONE (OUTPATIENT)
Dept: FAMILY MEDICINE CLINIC | Facility: CLINIC | Age: 54
End: 2019-11-26

## 2019-11-26 NOTE — TELEPHONE ENCOUNTER
Pt pre op clearance were fax to [facility] today , fax# [phone], confirmation recd @ 9:07am.   Pre op information will be in my yellow pre op folder until pt surgery date.

## 2019-12-02 ENCOUNTER — LAB ENCOUNTER (OUTPATIENT)
Dept: LAB | Age: 54
End: 2019-12-02
Attending: ORTHOPAEDIC SURGERY
Payer: COMMERCIAL

## 2019-12-02 DIAGNOSIS — Z01.818 PREOPERATIVE TESTING: ICD-10-CM

## 2019-12-02 PROCEDURE — 86900 BLOOD TYPING SEROLOGIC ABO: CPT

## 2019-12-02 PROCEDURE — 36415 COLL VENOUS BLD VENIPUNCTURE: CPT

## 2019-12-02 PROCEDURE — 86901 BLOOD TYPING SEROLOGIC RH(D): CPT

## 2019-12-02 PROCEDURE — 86850 RBC ANTIBODY SCREEN: CPT

## 2019-12-06 ENCOUNTER — ANESTHESIA (OUTPATIENT)
Dept: SURGERY | Facility: HOSPITAL | Age: 54
DRG: 455 | End: 2019-12-06
Payer: COMMERCIAL

## 2019-12-06 ENCOUNTER — ANESTHESIA EVENT (OUTPATIENT)
Dept: SURGERY | Facility: HOSPITAL | Age: 54
DRG: 455 | End: 2019-12-06
Payer: COMMERCIAL

## 2019-12-06 ENCOUNTER — APPOINTMENT (OUTPATIENT)
Dept: GENERAL RADIOLOGY | Facility: HOSPITAL | Age: 54
DRG: 455 | End: 2019-12-06
Attending: ORTHOPAEDIC SURGERY
Payer: COMMERCIAL

## 2019-12-06 ENCOUNTER — HOSPITAL ENCOUNTER (INPATIENT)
Facility: HOSPITAL | Age: 54
LOS: 1 days | Discharge: HOME HEALTH | DRG: 455 | End: 2019-12-07
Attending: ORTHOPAEDIC SURGERY | Admitting: ORTHOPAEDIC SURGERY
Payer: COMMERCIAL

## 2019-12-06 DIAGNOSIS — M51.36 DEGENERATIVE DISC DISEASE, LUMBAR: ICD-10-CM

## 2019-12-06 DIAGNOSIS — Z01.818 PREOPERATIVE TESTING: Primary | ICD-10-CM

## 2019-12-06 DIAGNOSIS — M48.062 SPINAL STENOSIS, LUMBAR REGION, WITH NEUROGENIC CLAUDICATION: ICD-10-CM

## 2019-12-06 PROBLEM — I10 ESSENTIAL HYPERTENSION: Chronic | Status: ACTIVE | Noted: 2019-12-06

## 2019-12-06 PROBLEM — M51.26 HERNIATED INTERVERTEBRAL DISC OF LUMBAR SPINE: Status: ACTIVE | Noted: 2019-12-06

## 2019-12-06 PROCEDURE — 0SG00A0 FUSION OF LUMBAR VERTEBRAL JOINT WITH INTERBODY FUSION DEVICE, ANTERIOR APPROACH, ANTERIOR COLUMN, OPEN APPROACH: ICD-10-PCS | Performed by: ORTHOPAEDIC SURGERY

## 2019-12-06 PROCEDURE — 99232 SBSQ HOSP IP/OBS MODERATE 35: CPT | Performed by: HOSPITALIST

## 2019-12-06 PROCEDURE — 0SG10K1 FUSION OF 2 OR MORE LUMBAR VERTEBRAL JOINTS WITH NONAUTOLOGOUS TISSUE SUBSTITUTE, POSTERIOR APPROACH, POSTERIOR COLUMN, OPEN APPROACH: ICD-10-PCS | Performed by: ORTHOPAEDIC SURGERY

## 2019-12-06 PROCEDURE — 4A11X4G MONITORING OF PERIPHERAL NERVOUS ELECTRICAL ACTIVITY, INTRAOPERATIVE, EXTERNAL APPROACH: ICD-10-PCS | Performed by: ORTHOPAEDIC SURGERY

## 2019-12-06 PROCEDURE — 76000 FLUOROSCOPY <1 HR PHYS/QHP: CPT | Performed by: ORTHOPAEDIC SURGERY

## 2019-12-06 DEVICE — INTERBODY FUSION DEVICE
Type: IMPLANTABLE DEVICE | Site: BACK | Status: FUNCTIONAL
Brand: CROSS-FUSE® II PEEK IBF SYSTEM

## 2019-12-06 DEVICE — 9.8-ASTRATICANNULATEDPOLY: Type: IMPLANTABLE DEVICE | Status: FUNCTIONAL

## 2019-12-06 DEVICE — SCREW ST T30  SPIN ASTRA: Type: IMPLANTABLE DEVICE | Status: FUNCTIONAL

## 2019-12-06 DEVICE — NANOSS 3D FILLER 10CC 25X100X4: Type: IMPLANTABLE DEVICE | Site: BACK | Status: FUNCTIONAL

## 2019-12-06 DEVICE — BONE GRAFT KIT 7510100 INFUSE X SMALL
Type: IMPLANTABLE DEVICE | Site: BACK | Status: FUNCTIONAL
Brand: INFUSE® BONE GRAFT

## 2019-12-06 RX ORDER — MORPHINE SULFATE 4 MG/ML
2 INJECTION, SOLUTION INTRAMUSCULAR; INTRAVENOUS EVERY 10 MIN PRN
Status: DISCONTINUED | OUTPATIENT
Start: 2019-12-06 | End: 2019-12-06 | Stop reason: HOSPADM

## 2019-12-06 RX ORDER — SODIUM CHLORIDE, SODIUM LACTATE, POTASSIUM CHLORIDE, CALCIUM CHLORIDE 600; 310; 30; 20 MG/100ML; MG/100ML; MG/100ML; MG/100ML
INJECTION, SOLUTION INTRAVENOUS CONTINUOUS
Status: DISCONTINUED | OUTPATIENT
Start: 2019-12-06 | End: 2019-12-06 | Stop reason: HOSPADM

## 2019-12-06 RX ORDER — MORPHINE SULFATE 10 MG/ML
6 INJECTION, SOLUTION INTRAMUSCULAR; INTRAVENOUS EVERY 10 MIN PRN
Status: DISCONTINUED | OUTPATIENT
Start: 2019-12-06 | End: 2019-12-06 | Stop reason: HOSPADM

## 2019-12-06 RX ORDER — EPHEDRINE SULFATE 50 MG/ML
INJECTION, SOLUTION INTRAVENOUS AS NEEDED
Status: DISCONTINUED | OUTPATIENT
Start: 2019-12-06 | End: 2019-12-06 | Stop reason: SURG

## 2019-12-06 RX ORDER — MIDAZOLAM HYDROCHLORIDE 1 MG/ML
INJECTION INTRAMUSCULAR; INTRAVENOUS AS NEEDED
Status: DISCONTINUED | OUTPATIENT
Start: 2019-12-06 | End: 2019-12-06 | Stop reason: SURG

## 2019-12-06 RX ORDER — DEXAMETHASONE SODIUM PHOSPHATE 4 MG/ML
VIAL (ML) INJECTION AS NEEDED
Status: DISCONTINUED | OUTPATIENT
Start: 2019-12-06 | End: 2019-12-06 | Stop reason: SURG

## 2019-12-06 RX ORDER — BISACODYL 10 MG
10 SUPPOSITORY, RECTAL RECTAL
Status: DISCONTINUED | OUTPATIENT
Start: 2019-12-06 | End: 2019-12-07

## 2019-12-06 RX ORDER — SODIUM CHLORIDE, SODIUM LACTATE, POTASSIUM CHLORIDE, CALCIUM CHLORIDE 600; 310; 30; 20 MG/100ML; MG/100ML; MG/100ML; MG/100ML
INJECTION, SOLUTION INTRAVENOUS CONTINUOUS
Status: DISCONTINUED | OUTPATIENT
Start: 2019-12-06 | End: 2019-12-07

## 2019-12-06 RX ORDER — NALOXONE HYDROCHLORIDE 0.4 MG/ML
80 INJECTION, SOLUTION INTRAMUSCULAR; INTRAVENOUS; SUBCUTANEOUS AS NEEDED
Status: DISCONTINUED | OUTPATIENT
Start: 2019-12-06 | End: 2019-12-06 | Stop reason: HOSPADM

## 2019-12-06 RX ORDER — MORPHINE SULFATE 4 MG/ML
4 INJECTION, SOLUTION INTRAMUSCULAR; INTRAVENOUS EVERY 10 MIN PRN
Status: DISCONTINUED | OUTPATIENT
Start: 2019-12-06 | End: 2019-12-06 | Stop reason: HOSPADM

## 2019-12-06 RX ORDER — CEFAZOLIN SODIUM/WATER 2 G/20 ML
2 SYRINGE (ML) INTRAVENOUS ONCE
Status: COMPLETED | OUTPATIENT
Start: 2019-12-06 | End: 2019-12-06

## 2019-12-06 RX ORDER — ASPIRIN 81 MG
100 TABLET, DELAYED RELEASE (ENTERIC COATED) ORAL 2 TIMES DAILY
Qty: 60 TABLET | Refills: 0 | Status: SHIPPED | OUTPATIENT
Start: 2019-12-06 | End: 2020-10-05

## 2019-12-06 RX ORDER — HYDROCODONE BITARTRATE AND ACETAMINOPHEN 10; 325 MG/1; MG/1
TABLET ORAL EVERY 4 HOURS PRN
Qty: 30 TABLET | Refills: 0 | Status: SHIPPED | OUTPATIENT
Start: 2019-12-09 | End: 2020-10-05

## 2019-12-06 RX ORDER — DIPHENHYDRAMINE HYDROCHLORIDE 50 MG/ML
25 INJECTION INTRAMUSCULAR; INTRAVENOUS EVERY 4 HOURS PRN
Status: DISCONTINUED | OUTPATIENT
Start: 2019-12-06 | End: 2019-12-07

## 2019-12-06 RX ORDER — HYDROCODONE BITARTRATE AND ACETAMINOPHEN 10; 325 MG/1; MG/1
1 TABLET ORAL EVERY 4 HOURS PRN
Status: DISCONTINUED | OUTPATIENT
Start: 2019-12-06 | End: 2019-12-07

## 2019-12-06 RX ORDER — DIPHENHYDRAMINE HCL 25 MG
25 CAPSULE ORAL EVERY 4 HOURS PRN
Status: DISCONTINUED | OUTPATIENT
Start: 2019-12-06 | End: 2019-12-07

## 2019-12-06 RX ORDER — ONDANSETRON 2 MG/ML
INJECTION INTRAMUSCULAR; INTRAVENOUS AS NEEDED
Status: DISCONTINUED | OUTPATIENT
Start: 2019-12-06 | End: 2019-12-06 | Stop reason: SURG

## 2019-12-06 RX ORDER — BUPIVACAINE HYDROCHLORIDE AND EPINEPHRINE 5; 5 MG/ML; UG/ML
INJECTION, SOLUTION PERINEURAL AS NEEDED
Status: DISCONTINUED | OUTPATIENT
Start: 2019-12-06 | End: 2019-12-06 | Stop reason: HOSPADM

## 2019-12-06 RX ORDER — HYDROMORPHONE HYDROCHLORIDE 1 MG/ML
0.2 INJECTION, SOLUTION INTRAMUSCULAR; INTRAVENOUS; SUBCUTANEOUS EVERY 5 MIN PRN
Status: DISCONTINUED | OUTPATIENT
Start: 2019-12-06 | End: 2019-12-06 | Stop reason: HOSPADM

## 2019-12-06 RX ORDER — HYDROMORPHONE HYDROCHLORIDE 1 MG/ML
0.3 INJECTION, SOLUTION INTRAMUSCULAR; INTRAVENOUS; SUBCUTANEOUS
Status: DISCONTINUED | OUTPATIENT
Start: 2019-12-06 | End: 2019-12-07

## 2019-12-06 RX ORDER — KETAMINE HYDROCHLORIDE 50 MG/ML
INJECTION, SOLUTION, CONCENTRATE INTRAMUSCULAR; INTRAVENOUS AS NEEDED
Status: DISCONTINUED | OUTPATIENT
Start: 2019-12-06 | End: 2019-12-06 | Stop reason: SURG

## 2019-12-06 RX ORDER — ONDANSETRON 2 MG/ML
4 INJECTION INTRAMUSCULAR; INTRAVENOUS ONCE AS NEEDED
Status: DISCONTINUED | OUTPATIENT
Start: 2019-12-06 | End: 2019-12-06 | Stop reason: HOSPADM

## 2019-12-06 RX ORDER — METOCLOPRAMIDE HYDROCHLORIDE 5 MG/ML
10 INJECTION INTRAMUSCULAR; INTRAVENOUS EVERY 6 HOURS PRN
Status: DISCONTINUED | OUTPATIENT
Start: 2019-12-06 | End: 2019-12-07

## 2019-12-06 RX ORDER — SODIUM CHLORIDE 9 MG/ML
INJECTION, SOLUTION INTRAVENOUS CONTINUOUS PRN
Status: DISCONTINUED | OUTPATIENT
Start: 2019-12-06 | End: 2019-12-06 | Stop reason: SURG

## 2019-12-06 RX ORDER — CALCIUM CARBONATE 200(500)MG
500 TABLET,CHEWABLE ORAL 2 TIMES DAILY
Status: DISCONTINUED | OUTPATIENT
Start: 2019-12-06 | End: 2019-12-07

## 2019-12-06 RX ORDER — ACETAMINOPHEN 500 MG
1000 TABLET ORAL ONCE
Status: COMPLETED | OUTPATIENT
Start: 2019-12-06 | End: 2019-12-06

## 2019-12-06 RX ORDER — ACETAMINOPHEN 325 MG/1
650 TABLET ORAL EVERY 4 HOURS PRN
Status: DISCONTINUED | OUTPATIENT
Start: 2019-12-06 | End: 2019-12-07

## 2019-12-06 RX ORDER — SENNOSIDES 8.6 MG
17.2 TABLET ORAL NIGHTLY
Status: DISCONTINUED | OUTPATIENT
Start: 2019-12-06 | End: 2019-12-07

## 2019-12-06 RX ORDER — DIAZEPAM 5 MG/1
5 TABLET ORAL EVERY 6 HOURS PRN
Status: DISCONTINUED | OUTPATIENT
Start: 2019-12-06 | End: 2019-12-07

## 2019-12-06 RX ORDER — ASCORBIC ACID 500 MG
1000 TABLET ORAL 2 TIMES DAILY
Status: DISCONTINUED | OUTPATIENT
Start: 2019-12-06 | End: 2019-12-07

## 2019-12-06 RX ORDER — HYDROCODONE BITARTRATE AND ACETAMINOPHEN 5; 325 MG/1; MG/1
2 TABLET ORAL AS NEEDED
Status: DISCONTINUED | OUTPATIENT
Start: 2019-12-06 | End: 2019-12-06 | Stop reason: HOSPADM

## 2019-12-06 RX ORDER — DIAZEPAM 5 MG/1
5 TABLET ORAL ONCE AS NEEDED
Status: DISCONTINUED | OUTPATIENT
Start: 2019-12-06 | End: 2019-12-06 | Stop reason: HOSPADM

## 2019-12-06 RX ORDER — HYDROMORPHONE HYDROCHLORIDE 1 MG/ML
0.6 INJECTION, SOLUTION INTRAMUSCULAR; INTRAVENOUS; SUBCUTANEOUS EVERY 5 MIN PRN
Status: DISCONTINUED | OUTPATIENT
Start: 2019-12-06 | End: 2019-12-06 | Stop reason: HOSPADM

## 2019-12-06 RX ORDER — PROCHLORPERAZINE EDISYLATE 5 MG/ML
5 INJECTION INTRAMUSCULAR; INTRAVENOUS ONCE AS NEEDED
Status: DISCONTINUED | OUTPATIENT
Start: 2019-12-06 | End: 2019-12-06 | Stop reason: HOSPADM

## 2019-12-06 RX ORDER — TIZANIDINE 4 MG/1
4 TABLET ORAL 3 TIMES DAILY PRN
Status: DISCONTINUED | OUTPATIENT
Start: 2019-12-06 | End: 2019-12-07

## 2019-12-06 RX ORDER — DOCUSATE SODIUM 100 MG/1
100 CAPSULE, LIQUID FILLED ORAL 2 TIMES DAILY
Status: DISCONTINUED | OUTPATIENT
Start: 2019-12-06 | End: 2019-12-07

## 2019-12-06 RX ORDER — METOCLOPRAMIDE 10 MG/1
10 TABLET ORAL ONCE
Status: COMPLETED | OUTPATIENT
Start: 2019-12-06 | End: 2019-12-06

## 2019-12-06 RX ORDER — FAMOTIDINE 20 MG/1
20 TABLET ORAL ONCE
Status: COMPLETED | OUTPATIENT
Start: 2019-12-06 | End: 2019-12-06

## 2019-12-06 RX ORDER — TIZANIDINE 2 MG/1
TABLET ORAL EVERY 6 HOURS PRN
Qty: 60 TABLET | Refills: 1 | Status: SHIPPED | OUTPATIENT
Start: 2019-12-06 | End: 2020-10-05

## 2019-12-06 RX ORDER — HYDROCODONE BITARTRATE AND ACETAMINOPHEN 10; 325 MG/1; MG/1
1-2 TABLET ORAL EVERY 6 HOURS PRN
Qty: 28 TABLET | Refills: 0 | Status: SHIPPED | OUTPATIENT
Start: 2019-12-06 | End: 2020-10-05

## 2019-12-06 RX ORDER — VALSARTAN AND HYDROCHLOROTHIAZIDE 160; 12.5 MG/1; MG/1
1 TABLET, FILM COATED ORAL
Status: DISCONTINUED | OUTPATIENT
Start: 2019-12-07 | End: 2019-12-06

## 2019-12-06 RX ORDER — POLYETHYLENE GLYCOL 3350 17 G/17G
17 POWDER, FOR SOLUTION ORAL DAILY
Qty: 510 G | Refills: 0 | Status: SHIPPED | OUTPATIENT
Start: 2019-12-06 | End: 2020-01-05

## 2019-12-06 RX ORDER — HYDROMORPHONE HYDROCHLORIDE 1 MG/ML
0.4 INJECTION, SOLUTION INTRAMUSCULAR; INTRAVENOUS; SUBCUTANEOUS EVERY 5 MIN PRN
Status: DISCONTINUED | OUTPATIENT
Start: 2019-12-06 | End: 2019-12-06 | Stop reason: HOSPADM

## 2019-12-06 RX ORDER — CEFAZOLIN SODIUM/WATER 2 G/20 ML
2 SYRINGE (ML) INTRAVENOUS EVERY 8 HOURS
Status: COMPLETED | OUTPATIENT
Start: 2019-12-06 | End: 2019-12-07

## 2019-12-06 RX ORDER — LIDOCAINE HYDROCHLORIDE 10 MG/ML
INJECTION, SOLUTION EPIDURAL; INFILTRATION; INTRACAUDAL; PERINEURAL AS NEEDED
Status: DISCONTINUED | OUTPATIENT
Start: 2019-12-06 | End: 2019-12-06 | Stop reason: SURG

## 2019-12-06 RX ORDER — VANCOMYCIN HYDROCHLORIDE 1 G/20ML
INJECTION, POWDER, LYOPHILIZED, FOR SOLUTION INTRAVENOUS AS NEEDED
Status: DISCONTINUED | OUTPATIENT
Start: 2019-12-06 | End: 2019-12-06 | Stop reason: HOSPADM

## 2019-12-06 RX ORDER — DEXAMETHASONE SODIUM PHOSPHATE 10 MG/ML
10 INJECTION, SOLUTION INTRAMUSCULAR; INTRAVENOUS ONCE
Status: COMPLETED | OUTPATIENT
Start: 2019-12-07 | End: 2019-12-07

## 2019-12-06 RX ORDER — PYRIDOXINE HCL (VITAMIN B6) 100 MG
1 TABLET ORAL 2 TIMES DAILY
Qty: 60 TABLET | Refills: 0 | Status: SHIPPED | OUTPATIENT
Start: 2019-12-06 | End: 2020-01-05

## 2019-12-06 RX ORDER — MORPHINE SULFATE 1 MG/ML
INJECTION, SOLUTION EPIDURAL; INTRATHECAL; INTRAVENOUS AS NEEDED
Status: DISCONTINUED | OUTPATIENT
Start: 2019-12-06 | End: 2019-12-06 | Stop reason: HOSPADM

## 2019-12-06 RX ORDER — SODIUM PHOSPHATE, DIBASIC AND SODIUM PHOSPHATE, MONOBASIC 7; 19 G/133ML; G/133ML
1 ENEMA RECTAL ONCE AS NEEDED
Status: DISCONTINUED | OUTPATIENT
Start: 2019-12-06 | End: 2019-12-07

## 2019-12-06 RX ORDER — HYDROCODONE BITARTRATE AND ACETAMINOPHEN 10; 325 MG/1; MG/1
2 TABLET ORAL EVERY 4 HOURS PRN
Status: DISCONTINUED | OUTPATIENT
Start: 2019-12-06 | End: 2019-12-07

## 2019-12-06 RX ORDER — HALOPERIDOL 5 MG/ML
0.25 INJECTION INTRAMUSCULAR ONCE AS NEEDED
Status: DISCONTINUED | OUTPATIENT
Start: 2019-12-06 | End: 2019-12-06 | Stop reason: HOSPADM

## 2019-12-06 RX ORDER — HYDROCODONE BITARTRATE AND ACETAMINOPHEN 5; 325 MG/1; MG/1
1 TABLET ORAL AS NEEDED
Status: DISCONTINUED | OUTPATIENT
Start: 2019-12-06 | End: 2019-12-06 | Stop reason: HOSPADM

## 2019-12-06 RX ORDER — ONDANSETRON 2 MG/ML
4 INJECTION INTRAMUSCULAR; INTRAVENOUS EVERY 4 HOURS PRN
Status: DISCONTINUED | OUTPATIENT
Start: 2019-12-06 | End: 2019-12-07

## 2019-12-06 RX ORDER — MULTIVIT WITH MINERALS/LUTEIN
1000 TABLET ORAL 2 TIMES DAILY
Qty: 60 TABLET | Refills: 0 | Status: SHIPPED | OUTPATIENT
Start: 2019-12-06 | End: 2020-10-05

## 2019-12-06 RX ORDER — POLYETHYLENE GLYCOL 3350 17 G/17G
17 POWDER, FOR SOLUTION ORAL DAILY PRN
Status: DISCONTINUED | OUTPATIENT
Start: 2019-12-06 | End: 2019-12-07

## 2019-12-06 RX ADMIN — SODIUM CHLORIDE, SODIUM LACTATE, POTASSIUM CHLORIDE, CALCIUM CHLORIDE: 600; 310; 30; 20 INJECTION, SOLUTION INTRAVENOUS at 11:56:00

## 2019-12-06 RX ADMIN — ONDANSETRON 4 MG: 2 INJECTION INTRAMUSCULAR; INTRAVENOUS at 08:50:00

## 2019-12-06 RX ADMIN — EPHEDRINE SULFATE 10 MG: 50 INJECTION, SOLUTION INTRAVENOUS at 08:53:00

## 2019-12-06 RX ADMIN — KETAMINE HYDROCHLORIDE 50 MG: 50 INJECTION, SOLUTION, CONCENTRATE INTRAMUSCULAR; INTRAVENOUS at 08:55:00

## 2019-12-06 RX ADMIN — MIDAZOLAM HYDROCHLORIDE 2 MG: 1 INJECTION INTRAMUSCULAR; INTRAVENOUS at 08:31:00

## 2019-12-06 RX ADMIN — LIDOCAINE HYDROCHLORIDE 50 MG: 10 INJECTION, SOLUTION EPIDURAL; INFILTRATION; INTRACAUDAL; PERINEURAL at 08:43:00

## 2019-12-06 RX ADMIN — SODIUM CHLORIDE: 9 INJECTION, SOLUTION INTRAVENOUS at 08:55:00

## 2019-12-06 RX ADMIN — DEXAMETHASONE SODIUM PHOSPHATE 4 MG: 4 MG/ML VIAL (ML) INJECTION at 08:50:00

## 2019-12-06 RX ADMIN — CEFAZOLIN SODIUM/WATER 2 G: 2 G/20 ML SYRINGE (ML) INTRAVENOUS at 09:00:00

## 2019-12-06 NOTE — OPERATIVE REPORT
[facility]    PATIENT'S NAME: Artur Cortés   ATTENDING PHYSICIAN: Aniyah Poole MD   OPERATING PHYSICIAN: Suha Lundy.  Candelaria Gold MD   PATIENT ACCOUNT#:   [id number]    LOCATION:  [facility] PACU 7 [facility] 10  MEDICAL RECORD #:   [id number]       DATE endotracheal anesthesia and placed in the lateral position with the right side up, and the table was slightly jackknifed. The patient was securely taped down to the table and all pressure points were well padded.     After obtaining localizing x-rays with was applied. The patient was placed then prone on the [address] [place] [address] frame for the remainder of the procedure. The back was reprepped and redraped in the usual fashion.     Two 2-inch incisions were made each approximately 3.5 cm lateral to the midline for

## 2019-12-06 NOTE — ANESTHESIA PROCEDURE NOTES
Airway  Urgency: Elective      General Information and Staff    Patient location during procedure: OR  Anesthesiologist: Justina Foss MD  Resident/CRNA: Maribel Sim CRNA  Performed: CRNA     Indications and Patient Condition  Indications for airway m

## 2019-12-06 NOTE — OPERATIVE REPORT
Pre-postop dx:  L2-3 adjacent segment DDD, spinal stenosis with HNP  Proc: right Lat fusion L2-3, [place] 22 x 13 x 45 mm 6 degree lordotic cage, allograft/infuse, posterior fusion L2-3, [employer] Piffard screws L2-4  Juan Manuel/Ida CHAVEZ  Ebl: 20 cc  Drain

## 2019-12-06 NOTE — PROGRESS NOTES
[facility] HOSP - [facility]    Progress Note    Bakari Knox Patient Status:  Surgery Admit - Inpt    1965 MRN [id number]   [facility] [place] [facility] Attending Olivia Holloway MD   Hosp Day # 0 PCP NEHEMIAS Saravia hypertension  CONT HOME MEDS, MONITOR.              Results:     Lab Results   Component Value Date    WBC 6.7 11/23/2019    HGB 15.2 11/23/2019    HCT 44.1 11/23/2019    .0 11/23/2019    CREATSERUM 1.05 11/23/2019    BUN 13 11/23/2019     11/2

## 2019-12-06 NOTE — PROGRESS NOTES
Therapeutic interchange from Diovan to Microzide per P&T approved protocol.      Thank you,  Cary Youngblood, PharmD

## 2019-12-06 NOTE — ANESTHESIA POSTPROCEDURE EVALUATION
Patient: Chivo Tidwell    Procedure Summary     Date:  12/06/19 Room / Location:  [address] [place] [address] MAIN OR 09 / [facility] MAIN OR    Anesthesia Start:  0831 Anesthesia Stop:      Procedures:       FAR LAT.  LUMBAR INTERBODY FUSION W/ PLATE 1 LEVEL (Right )      POSTERIOR L

## 2019-12-06 NOTE — H&P
Patient is here for for preoperative history and physical. The patient will be having back fusion surgery with Dr. Katie Morfin on 12/6/19 at [address] [place] [address]. No acute issues or problems. Chronic medical problems - hypertension has been stable.  Patient denies any problem present. Cardiovascular: Normal rate, regular rhythm, normal heart sounds and intact distal pulses. Pulmonary/Chest: Effort normal and breath sounds normal.   Abdominal: Soft. Bowel sounds are normal. He exhibits no distension. There is no tenderness.

## 2019-12-06 NOTE — CM/SW NOTE
SW received MDO for advanced directives. SW met with patient to discuss completion of forms, all questions answered. Pt has 3 family members at bedside, requested to review the forms and complete when he is ready.  Pt is aware documents must be witnessed by

## 2019-12-07 VITALS
HEART RATE: 105 BPM | OXYGEN SATURATION: 98 % | HEIGHT: 68 IN | BODY MASS INDEX: 36.98 KG/M2 | WEIGHT: 244 LBS | RESPIRATION RATE: 20 BRPM | SYSTOLIC BLOOD PRESSURE: 150 MMHG | TEMPERATURE: 99 F | DIASTOLIC BLOOD PRESSURE: 92 MMHG

## 2019-12-07 PROCEDURE — 99239 HOSP IP/OBS DSCHRG MGMT >30: CPT | Performed by: HOSPITALIST

## 2019-12-07 RX ORDER — HYDRALAZINE HYDROCHLORIDE 20 MG/ML
10 INJECTION INTRAMUSCULAR; INTRAVENOUS EVERY 6 HOURS PRN
Status: DISCONTINUED | OUTPATIENT
Start: 2019-12-07 | End: 2019-12-07

## 2019-12-07 NOTE — PLAN OF CARE
Patient up with standby and walker. CMS is intact. Pain controled with 2 Norco tablets. LSO brace on and aligned. Voiding freely. Going to be discharged today with home healthcare.    Problem: Patient Centered Care  Goal: Patient preferences are identified

## 2019-12-07 NOTE — PLAN OF CARE
BP elevated overnight but pt asymptomatic. Pt reports not taking BP medication pre-op. MD notified, order for hydralazine 10mg IV q6 PRN for SBP >160 DBP >100 ordered.  Pt educated about importance of calling staff before getting up, having reported a histo

## 2019-12-07 NOTE — HOME CARE LIAISON
Met with patient at the bedside. Patient is agreeable to [facility]. Residential brochure provided with contact information. All questions addressed and answered.

## 2019-12-07 NOTE — OCCUPATIONAL THERAPY NOTE
OCCUPATIONAL THERAPY EVALUATION - INPATIENT      Room Number: 428/428-A  Evaluation Date: 12/7/2019  Type of Evaluation: Initial  Presenting Problem: (L2-L3 posterlateral and R lateral fusion)    Physician Order: IP Consult to Occupational Therapy  Reason adenomatous polyps in 2016   • Unspecified essential hypertension    • Visual impairment        Past Surgical History  Past Surgical History:   Procedure Laterality Date   • ANTERIOR CERVICAL FUSION BG & INST 1 LEVEL N/A 4/19/2017    Performed by Yue Marti Restriction: None                PAIN ASSESSMENT  Ratin  Location: (Surgical spine area )  Management Techniques: Repositioning(Pt reports he had received pain medication earlier )    COGNITION  Overall Cognitive Status:  WFL - within functional limits discharged from OT at this time. RN aware.      Burwell, OTR/L 12/7/2019

## 2019-12-07 NOTE — PHYSICAL THERAPY NOTE
PHYSICAL THERAPY EVALUATION - INPATIENT    Room Number: 428/428-A  Evaluation Date: 12/7/2019  Presenting Problem: R fusion L2/3 with instrurmentation L2-4  Physician Order: PT Eval and Treat    Problem List  Principal Problem:    Herniated intervertebra Lives With: Spouse  Drives: Yes  Patient Owned Equipment: Rolling walker;Cane  Patient Regularly Uses: Glasses    Prior Level of [place]: L3-4 fusion 3 yrs ago. Recent incr LBP and bilat LE pain.   Here for elective back Sx    SUBJECTIVE  Feeling wife present. OT participated in session. 3/3 with BLT precautions. Able to perform all tasks, transfers with supervision. Able to log roll without cues. Able to don TLSO at EOB. Walked 2x150 ft RW and perform 12 recirpocal stairs.   Returned to chair

## 2019-12-07 NOTE — DISCHARGE SUMMARY
[facility] - [facility]    Discharge Summary    Trino Jane Patient Status:  Inpatient    1965 MRN [id number]   Location [facility] 4W/SW/SE Attending No att. providers found   [facility] Day # 1 PCP Jacques Rincon MD     Date of Admissio Fluoroscopy C-arm Time <1 Hour  (cpt=76000)    Result Date: 12/6/2019  CONCLUSION: Fluoroscopic guidance as above.  239.7-seconds of fluoroscopy time were used.  2 fluoroscopic images as well as a 1 page-dose summary image are stored with this exam.   Oliverio Sterling hours as needed for Pain (chronic pain and surgery).    Quantity:  28 tablet  Refills:  0     HYDROcodone-acetaminophen  MG Tabs  Commonly known as:  [address]  Start taking on:  December 9, 2019      Take 0.5-1 tablets by mouth every 4 (four) [place] [id number]  [phone]    In 2 weeks        Consultants     Provider Role Specialty    Helene Art MD Consulting Physician  HOSPITALIST          Discharge instructions:  SPINE SURGERY     Incision:  Ok to get wet in a shower two days after surgery.

## 2019-12-07 NOTE — PROGRESS NOTES
S: He has controlled back pain  And leg pain. He as worked with PT/OT. He feels very well and wants to go home. Wife is in room with him today and wants him to go home    Inspection:  Awake alert No acute distress.  No difficulty breathing     Blood pres

## 2019-12-10 ENCOUNTER — TELEPHONE (OUTPATIENT)
Dept: INTERNAL MEDICINE UNIT | Facility: HOSPITAL | Age: 54
End: 2019-12-10

## 2019-12-10 NOTE — TELEPHONE ENCOUNTER
Pt discharged from [facility] on 12/7/19, with recommendation to f/u w PCP in 2 weeks . Call made to pt to assist with scheduling. Pt declined scheduling at this time,  stating he will call the office to schedule after he speaks to his wife.

## 2019-12-16 ENCOUNTER — OFFICE VISIT (OUTPATIENT)
Dept: FAMILY MEDICINE CLINIC | Facility: CLINIC | Age: 54
End: 2019-12-16
Payer: COMMERCIAL

## 2019-12-16 VITALS
WEIGHT: 245 LBS | SYSTOLIC BLOOD PRESSURE: 117 MMHG | DIASTOLIC BLOOD PRESSURE: 84 MMHG | HEIGHT: 68 IN | TEMPERATURE: 99 F | RESPIRATION RATE: 20 BRPM | HEART RATE: 86 BPM | BODY MASS INDEX: 37.13 KG/M2

## 2019-12-16 DIAGNOSIS — K40.90 UNILATERAL INGUINAL HERNIA WITHOUT OBSTRUCTION OR GANGRENE, RECURRENCE NOT SPECIFIED: ICD-10-CM

## 2019-12-16 DIAGNOSIS — G47.30 SLEEP APNEA, UNSPECIFIED TYPE: ICD-10-CM

## 2019-12-16 PROCEDURE — 99213 OFFICE O/P EST LOW 20 MIN: CPT | Performed by: FAMILY MEDICINE

## 2019-12-16 NOTE — PROGRESS NOTES
HPI:    Patient ID: Elizabeth Burris is a 48year old male. Pt presents with some right groin pain over the last week. Pt noticed a bulge of that area and thinks he has a hernia.    Pt also had surgery for his back recently and is doing well and has foll Unilateral inguinal hernia without obstruction or gangrene, recurrence not specified:  - After discussion, will send to general surgery for further evaluation and treatment; To call if any significant symptoms.      Sleep apnea, unspecified type:  - After

## 2019-12-17 ENCOUNTER — TELEPHONE (OUTPATIENT)
Dept: FAMILY MEDICINE CLINIC | Facility: CLINIC | Age: 54
End: 2019-12-17

## 2019-12-17 NOTE — TELEPHONE ENCOUNTER
Domenico from [facility] calling to report abnormal blood pressure readings, 168/110 left arm and 164/92 right arm. Patient  is asymptomatic and all other vitals are normal. Also medications have been taken.  Requesting call back from nursesc

## 2019-12-17 NOTE — TELEPHONE ENCOUNTER
Message noted; To monitor blood pressure; To call if any persistent elevation of blood pressure or any sig symptoms.

## 2019-12-27 NOTE — LETTER
Hospital Discharge Documentation  From: [address] Hospitalist's Office  Phone: [phone]    Patient discharged time/date: 4/19/2017  3:53 PM  Patient discharge disposition:  Home or Self Care  No discharge summary available.   See below for patient'   Electronically signed by Elijah Montes MD at 4/19/2017  7:52 AM       Chart Review: Note Routing History      Routing history could not be found for this note.  This is because the note has never been routed or because communication record creation was Statement Selected

## 2020-01-01 RX ORDER — VALSARTAN AND HYDROCHLOROTHIAZIDE 160; 12.5 MG/1; MG/1
TABLET, FILM COATED ORAL
Qty: 90 TABLET | Refills: 1 | Status: SHIPPED | OUTPATIENT
Start: 2020-01-01 | End: 2020-06-22

## 2020-01-01 NOTE — TELEPHONE ENCOUNTER
Refill passed per [facility] protocol.     Hypertensive Medications  Protocol Criteria:  · Appointment scheduled in the past 6 months or in the next 3 months  · BMP or CMP in the past 12 months  · Creatinine result < 2  Recent Outpatient Visits

## 2020-06-11 ENCOUNTER — TELEPHONE (OUTPATIENT)
Dept: FAMILY MEDICINE CLINIC | Facility: CLINIC | Age: 55
End: 2020-06-11

## 2020-06-11 ENCOUNTER — HOSPITAL ENCOUNTER (OUTPATIENT)
Dept: CT IMAGING | Age: 55
Discharge: HOME | End: 2020-06-11
Attending: FAMILY MEDICINE

## 2020-06-11 DIAGNOSIS — Z13.9 ENCOUNTER FOR SCREENING: ICD-10-CM

## 2020-06-12 NOTE — TELEPHONE ENCOUNTER
Received CT calcium score results and calcium score 0. Pt contacted and voice message left. To call if any questions or symptoms.

## 2020-06-22 RX ORDER — VALSARTAN AND HYDROCHLOROTHIAZIDE 160; 12.5 MG/1; MG/1
TABLET, FILM COATED ORAL
Qty: 90 TABLET | Refills: 1 | Status: SHIPPED | OUTPATIENT
Start: 2020-06-22 | End: 2020-12-16

## 2020-08-02 NOTE — TELEPHONE ENCOUNTER
Message noted: Chart reviewed and may refill medication times one 90 day supply as requested with 1 additional refill. Prescription sent to listed pharmacy. Pharmacy to notify patient.  Pt notified through [facility]
Statement Selected

## 2020-08-03 ENCOUNTER — OFFICE VISIT (OUTPATIENT)
Dept: SLEEP CENTER | Age: 55
End: 2020-08-03
Attending: FAMILY MEDICINE
Payer: COMMERCIAL

## 2020-08-03 DIAGNOSIS — G47.33 OSA (OBSTRUCTIVE SLEEP APNEA): Primary | ICD-10-CM

## 2020-08-03 DIAGNOSIS — G47.30 SLEEP APNEA, UNSPECIFIED TYPE: ICD-10-CM

## 2020-08-03 PROCEDURE — 95806 SLEEP STUDY UNATT&RESP EFFT: CPT

## 2020-08-06 ENCOUNTER — TELEPHONE (OUTPATIENT)
Dept: FAMILY MEDICINE CLINIC | Facility: CLINIC | Age: 55
End: 2020-08-06

## 2020-08-06 DIAGNOSIS — G47.30 SLEEP APNEA, UNSPECIFIED TYPE: Primary | ICD-10-CM

## 2020-08-06 NOTE — PROCEDURES
[facility]  Accredited by the [facility] ([facility])    PATIENT'S NAME: Tj Corea   ATTENDING PHYSICIAN: Brian Hidalgo MD   REFERRING PHYSICIAN: Brian Hidalgo MD   PATIENT ACCOUNT #: [de-identified] LOCATION: S RECOMMENDATIONS:    1. Considering his clinical syndrome, would proceed to CPAP titration. 2.   Weight loss. 3.   Avoid alcohol. 4.   Avoid sedating drug. 5.   The patient should not drive if at all sleepy.     Please do not hesitate to contact me

## 2020-08-06 NOTE — TELEPHONE ENCOUNTER
Sleep study showed mild sleep apnea. Patient contacted. Discussed results and after discussion, will order CPAP titration. Patient verbalized understanding of recommendations and agrees to plan. Order generated and sent./ Pt notified.

## 2020-08-25 ENCOUNTER — ORDER TRANSCRIPTION (OUTPATIENT)
Dept: SLEEP CENTER | Age: 55
End: 2020-08-25

## 2020-08-25 DIAGNOSIS — G47.33 OBSTRUCTIVE SLEEP APNEA (ADULT) (PEDIATRIC): Primary | ICD-10-CM

## 2020-08-27 ENCOUNTER — TELEPHONE (OUTPATIENT)
Dept: FAMILY MEDICINE CLINIC | Facility: CLINIC | Age: 55
End: 2020-08-27

## 2020-08-27 DIAGNOSIS — G47.30 SLEEP APNEA, UNSPECIFIED TYPE: Primary | ICD-10-CM

## 2020-08-27 NOTE — TELEPHONE ENCOUNTER
[facility] called in stating that insurance had denied the CPAP titration study. She states that the insurance wants an AutoPap instead. She is going to cancel the titration study.      She states the she believes an order will need to go to DME

## 2020-08-28 NOTE — TELEPHONE ENCOUNTER
Order generated for autopap. Can inform pt of denial of CPAP titration and see if insurance will cover autopap machine.

## 2020-08-28 NOTE — TELEPHONE ENCOUNTER
Left detailed message for patient letting him know of the denial for the cpap titration and that a new order for autopap was placed.

## 2020-08-31 ENCOUNTER — TELEPHONE (OUTPATIENT)
Dept: CASE MANAGEMENT | Age: 55
End: 2020-08-31

## 2020-08-31 ENCOUNTER — PATIENT MESSAGE (OUTPATIENT)
Dept: FAMILY MEDICINE CLINIC | Facility: CLINIC | Age: 55
End: 2020-08-31

## 2020-08-31 DIAGNOSIS — G47.30 SLEEP APNEA, UNSPECIFIED TYPE: Primary | ICD-10-CM

## 2020-08-31 NOTE — TELEPHONE ENCOUNTER
From: Juan Ramirez  To: Jose Swenson MD  Sent: 8/31/2020 2:03 PM CDT  Subject: Referral Request    Dr. Halie Carvajal left a message asking if I am ok with a referral for an APAP machine.  Yes, an APAP referral is fine with me  Adryan Contreras No

## 2020-08-31 NOTE — TELEPHONE ENCOUNTER
Message noted and order and referral for APAP machine generated. Staff given papers to mail to pt.   Pt notified through [facility]

## 2020-08-31 NOTE — TELEPHONE ENCOUNTER
Message noted. Pt contacted and voice message left about below. Pt to call back to see if he would like proceed with APAP machine instead of CPAP titration. Will provided and send to patient or vendor to start APAP if pt desires.

## 2020-08-31 NOTE — TELEPHONE ENCOUNTER
Dr Beatrice Faulkner see message about Referral from [facility] for details.

## 2020-08-31 NOTE — TELEPHONE ENCOUNTER
Hi Dr. Mattie Badillo,    I am working on the referral you submitted for Dante to have a CPAP titration test.  His insurance company has denied the in lab study, but will approve an APAP machine.     If you agree, please send an order for an APAP device to the [facility]endo

## 2020-09-01 NOTE — TELEPHONE ENCOUNTER
Spoke with patient ( verified) and relayed Belle's message below and [facility] # for follow up later in the week--patient verbalizes understanding and agreement. No further questions/concerns at this time.

## 2020-09-01 NOTE — TELEPHONE ENCOUNTER
Left message for patient. Demographics, Insurance, DME order, results and office notes faxed to [facility].

## 2020-09-25 ENCOUNTER — MED REC SCAN ONLY (OUTPATIENT)
Dept: FAMILY MEDICINE CLINIC | Facility: CLINIC | Age: 55
End: 2020-09-25

## 2020-09-29 ENCOUNTER — OFFICE VISIT (OUTPATIENT)
Dept: FAMILY MEDICINE CLINIC | Facility: CLINIC | Age: 55
End: 2020-09-29
Payer: COMMERCIAL

## 2020-09-29 VITALS
WEIGHT: 248 LBS | HEART RATE: 78 BPM | BODY MASS INDEX: 37.59 KG/M2 | HEIGHT: 68 IN | SYSTOLIC BLOOD PRESSURE: 143 MMHG | DIASTOLIC BLOOD PRESSURE: 92 MMHG | TEMPERATURE: 97 F

## 2020-09-29 DIAGNOSIS — R10.31 RIGHT GROIN PAIN: Primary | ICD-10-CM

## 2020-09-29 PROCEDURE — 3080F DIAST BP >= 90 MM HG: CPT | Performed by: FAMILY MEDICINE

## 2020-09-29 PROCEDURE — 3008F BODY MASS INDEX DOCD: CPT | Performed by: FAMILY MEDICINE

## 2020-09-29 PROCEDURE — 99213 OFFICE O/P EST LOW 20 MIN: CPT | Performed by: FAMILY MEDICINE

## 2020-09-29 PROCEDURE — 3077F SYST BP >= 140 MM HG: CPT | Performed by: FAMILY MEDICINE

## 2020-09-29 NOTE — PROGRESS NOTES
HPI:    Patient ID: Juan Ramirez is a 48year old male. Pt presents with right groin pain over the last year. Pt thought he had hernia and did see a surgeon who thought this was related to the muscular issue.  Has also seen an orthopedic who treated He appears well-developed and well-nourished. Abdominal: Hernia confirmed negative in the right inguinal area and confirmed negative in the left inguinal area. Genitourinary:    Penis normal.   Right testis shows no mass, no swelling and no tenderness.

## 2020-10-05 ENCOUNTER — NURSE TRIAGE (OUTPATIENT)
Dept: FAMILY MEDICINE CLINIC | Facility: CLINIC | Age: 55
End: 2020-10-05

## 2020-10-05 ENCOUNTER — TELEMEDICINE (OUTPATIENT)
Dept: FAMILY MEDICINE CLINIC | Facility: CLINIC | Age: 55
End: 2020-10-05
Payer: COMMERCIAL

## 2020-10-05 DIAGNOSIS — Z20.822 EXPOSURE TO COVID-19 VIRUS: Primary | ICD-10-CM

## 2020-10-05 PROCEDURE — 99213 OFFICE O/P EST LOW 20 MIN: CPT | Performed by: PHYSICIAN ASSISTANT

## 2020-10-05 NOTE — TELEPHONE ENCOUNTER
Lavonne Carrasco Pt stated that he has been exposed to covid -19. Pt stated that on Monday morning he was with this person outside and they where walking together to the office. Less then 6 feet no mask.  Pt stated that he did see you in the afternoon for

## 2020-10-05 NOTE — PROGRESS NOTES
Please note that the following visit was completed using two-way, real-time interactive audio and/or video communication.   This has been done in good karuna to provide continuity of care in the best interest of the provider-patient relationship, due to the patient was made aware of where to find [facility] notice of privacy practices, telehealth consent form and other related consent forms and documents. which are located on the [facility] website.  The patient verbally agreed to telehealth consent form, related consent HYDROcodone-acetaminophen (NORCO)  MG Oral Tab Take 1-2 tablets by mouth every 6 (six) hours as needed for Pain (chronic pain and surgery).  (Patient not taking: Reported on 7/2/2020 ) 28 tablet 0   • tiZANidine HCl 2 MG Oral Tab Take 1-2 tablets (2-4 problem    • Cholelithiasis    • High blood pressure    • Neuropathy 06/2019    Right foot drop, and right leg weakness   • Problems with swallowing     R/T fusion, liquids and solids occasionally   • Screen for colon cancer 9/2016    Repeat in 9/2021 due Referrals:  None               Matthew Mccormick PA-C  10/5/2020  5:58 PM    #[phone]

## 2020-10-06 ENCOUNTER — APPOINTMENT (OUTPATIENT)
Dept: LAB | Age: 55
End: 2020-10-06
Attending: PHYSICIAN ASSISTANT
Payer: COMMERCIAL

## 2020-10-06 DIAGNOSIS — Z20.822 EXPOSURE TO COVID-19 VIRUS: ICD-10-CM

## 2020-10-14 ENCOUNTER — HOSPITAL ENCOUNTER (OUTPATIENT)
Dept: ULTRASOUND IMAGING | Age: 55
Discharge: HOME | End: 2020-10-14
Attending: FAMILY MEDICINE
Payer: COMMERCIAL

## 2020-10-14 DIAGNOSIS — R10.31 RIGHT GROIN PAIN: ICD-10-CM

## 2020-10-14 PROCEDURE — 76870 US EXAM SCROTUM: CPT | Performed by: FAMILY MEDICINE

## 2020-10-14 PROCEDURE — 93975 VASCULAR STUDY: CPT | Performed by: FAMILY MEDICINE

## 2020-12-16 RX ORDER — VALSARTAN AND HYDROCHLOROTHIAZIDE 160; 12.5 MG/1; MG/1
TABLET, FILM COATED ORAL
Qty: 90 TABLET | Refills: 1 | Status: SHIPPED | OUTPATIENT
Start: 2020-12-16 | End: 2021-06-12

## 2020-12-16 NOTE — TELEPHONE ENCOUNTER
Message noted: Chart reviewed and may refill medication times one 90 day supply as requested with 1 additional refill. Prescription sent to listed pharmacy. Pharmacy to notify patient.  Pt notified through [facility]

## 2021-02-25 PROBLEM — M24.111 LABRAL TEAR OF SHOULDER, DEGENERATIVE, RIGHT: Status: ACTIVE | Noted: 2021-02-25

## 2021-02-25 PROBLEM — IMO0002 DISORDER OF ROTATOR CUFF SYNDROME OF RIGHT SHOULDER AND ALLIED DISORDER: Status: ACTIVE | Noted: 2021-02-25

## 2021-03-12 DIAGNOSIS — Z23 NEED FOR VACCINATION: ICD-10-CM

## 2021-03-13 ENCOUNTER — LAB ENCOUNTER (OUTPATIENT)
Dept: LAB | Age: 56
End: 2021-03-13
Attending: ORTHOPAEDIC SURGERY
Payer: COMMERCIAL

## 2021-03-13 DIAGNOSIS — M24.111 ARTICULAR CARTILAGE DISORDER OF SHOULDER, RIGHT: ICD-10-CM

## 2021-03-13 DIAGNOSIS — M75.81 TENDINITIS OF RIGHT ROTATOR CUFF: ICD-10-CM

## 2021-03-13 DIAGNOSIS — M19.011 PRIMARY OSTEOARTHRITIS OF RIGHT SHOULDER: ICD-10-CM

## 2021-03-13 DIAGNOSIS — M25.511 ACUTE PAIN OF RIGHT SHOULDER: ICD-10-CM

## 2021-03-13 LAB
ANION GAP SERPL CALC-SCNC: 7 MMOL/L (ref 0–18)
BUN BLD-MCNC: 13 MG/DL (ref 7–18)
BUN/CREAT SERPL: 13.7 (ref 10–20)
CALCIUM BLD-MCNC: 9.2 MG/DL (ref 8.5–10.1)
CHLORIDE SERPL-SCNC: 110 MMOL/L (ref 98–112)
CO2 SERPL-SCNC: 26 MMOL/L (ref 21–32)
CREAT BLD-MCNC: 0.95 MG/DL
GLUCOSE BLD-MCNC: 101 MG/DL (ref 70–99)
OSMOLALITY SERPL CALC.SUM OF ELEC: 296 MOSM/KG (ref 275–295)
PATIENT FASTING Y/N/NP: YES
POTASSIUM SERPL-SCNC: 3.9 MMOL/L (ref 3.5–5.1)
SODIUM SERPL-SCNC: 143 MMOL/L (ref 136–145)

## 2021-03-13 PROCEDURE — 36415 COLL VENOUS BLD VENIPUNCTURE: CPT

## 2021-03-13 PROCEDURE — 80048 BASIC METABOLIC PNL TOTAL CA: CPT

## 2021-03-16 ENCOUNTER — TELEPHONE (OUTPATIENT)
Dept: FAMILY MEDICINE CLINIC | Facility: CLINIC | Age: 56
End: 2021-03-16

## 2021-03-16 NOTE — TELEPHONE ENCOUNTER
Patient called message left on pt voice mail  To call us back to schedule a pre op with Dr. Jameson Sanz. PSR or Call room please schedule.   thanks

## 2021-03-17 NOTE — TELEPHONE ENCOUNTER
Dr. Kenneth Kruse Per pt states that Bobbi Johnson said that he do not need Pre op Clearance. Patient Did not schedule appointment.  [place]

## 2021-03-23 ENCOUNTER — MED REC SCAN ONLY (OUTPATIENT)
Dept: FAMILY MEDICINE CLINIC | Facility: CLINIC | Age: 56
End: 2021-03-23

## 2021-04-29 ENCOUNTER — MED REC SCAN ONLY (OUTPATIENT)
Dept: FAMILY MEDICINE CLINIC | Facility: CLINIC | Age: 56
End: 2021-04-29

## 2021-06-12 RX ORDER — VALSARTAN AND HYDROCHLOROTHIAZIDE 160; 12.5 MG/1; MG/1
TABLET, FILM COATED ORAL
Qty: 90 TABLET | Refills: 1 | Status: SHIPPED | OUTPATIENT
Start: 2021-06-12 | End: 2021-12-07

## 2021-06-12 NOTE — TELEPHONE ENCOUNTER
Message noted: Chart reviewed and may refill medication times one 90 day supply as requested with 1 additional refill. Prescription sent to listed pharmacy. Pharmacy to notify patient.  Pt notified through [facility]

## 2021-06-17 ENCOUNTER — TELEPHONE (OUTPATIENT)
Dept: PHYSICAL THERAPY | Facility: HOSPITAL | Age: 56
End: 2021-06-17

## 2021-06-17 ENCOUNTER — ORDER TRANSCRIPTION (OUTPATIENT)
Dept: PHYSICAL THERAPY | Facility: HOSPITAL | Age: 56
End: 2021-06-17

## 2021-06-17 DIAGNOSIS — M25.561 ACUTE PAIN OF RIGHT KNEE: Primary | ICD-10-CM

## 2021-07-12 ENCOUNTER — OFFICE VISIT (OUTPATIENT)
Dept: PHYSICAL THERAPY | Age: 56
End: 2021-07-12
Attending: ORTHOPAEDIC SURGERY
Payer: COMMERCIAL

## 2021-07-12 DIAGNOSIS — M25.561 ACUTE PAIN OF RIGHT KNEE: ICD-10-CM

## 2021-07-12 PROCEDURE — 97162 PT EVAL MOD COMPLEX 30 MIN: CPT | Performed by: PHYSICAL THERAPIST

## 2021-07-12 PROCEDURE — 97110 THERAPEUTIC EXERCISES: CPT | Performed by: PHYSICAL THERAPIST

## 2021-07-12 NOTE — PROGRESS NOTES
LOWER EXTREMITY EVALUATION:   Referring Physician: Dr. Kathrin Elias  Diagnosis: Acute pain of right knee (M25.561) Date of Service: 7/12/2021     PATIENT SUMMARY   Bandar Burkett is a 50year old male who presents to therapy today with complaints of onset radiculopathy   Noted: 6/19/2017   Left carpal tunnel syndrome   Noted: 6/19/2017   SX/GLOBAL/ DR. ORELLANA /ISABELL/ACDF C4-5-6 SPINECRAFT PLATE, CAGES, ALLOGRAFT//JAMAL TO ASSIST / DOS 04/19/17 / EXP 07/18/17   Noted: 5/1/2017   Cervical spinal stenosis restriction    Strength/MMT: (* denotes performed with pain)  Hip Knee   Flexion: R 4+/5; L 4+/5  Extension: R 4/5; L 4+/5  Abduction: R 4+/5; L 4+/5  ER: R 4+/5; L 4+/5   Flexion: R 5-/5; L 5-/5  Extension: R 5-/5; L 5-/5        Special tests:   + R lim Therapeutic Exercise, Home Exercise Program instruction and Modalities to include: Electrical stimulation (unattended), Ultrasound and MHP, cold pack    Education or treatment limitation: None  Rehab Potential:good    FOTO: [phone]    Patient/Family/Caregi

## 2021-07-14 ENCOUNTER — OFFICE VISIT (OUTPATIENT)
Dept: PHYSICAL THERAPY | Age: 56
End: 2021-07-14
Attending: ORTHOPAEDIC SURGERY
Payer: COMMERCIAL

## 2021-07-14 DIAGNOSIS — M25.561 ACUTE PAIN OF RIGHT KNEE: ICD-10-CM

## 2021-07-14 PROCEDURE — 97014 ELECTRIC STIMULATION THERAPY: CPT | Performed by: PHYSICAL THERAPIST

## 2021-07-14 PROCEDURE — 97110 THERAPEUTIC EXERCISES: CPT | Performed by: PHYSICAL THERAPIST

## 2021-07-14 NOTE — PROGRESS NOTES
Dx: Acute pain of right knee (M25.561)       Insurance (Authorized # of Visits):  22 per year        Authorizing Physician: Dr. Ronny Pedraza  Next MD visit: if needed  Fall Risk: standard         Precautions:       HTN;  history of: lumbar and cerivcal spine fu SLR, bridge with ball for adductor isometric  Charges: 2 TE, 1 IFC      Total Timed Treatment: 32 min  Total Treatment Time: 42 min

## 2021-07-19 ENCOUNTER — APPOINTMENT (OUTPATIENT)
Dept: PHYSICAL THERAPY | Age: 56
End: 2021-07-19
Attending: ORTHOPAEDIC SURGERY
Payer: COMMERCIAL

## 2021-07-21 ENCOUNTER — APPOINTMENT (OUTPATIENT)
Dept: PHYSICAL THERAPY | Age: 56
End: 2021-07-21
Attending: ORTHOPAEDIC SURGERY
Payer: COMMERCIAL

## 2021-07-26 ENCOUNTER — APPOINTMENT (OUTPATIENT)
Dept: PHYSICAL THERAPY | Age: 56
End: 2021-07-26
Attending: ORTHOPAEDIC SURGERY
Payer: COMMERCIAL

## 2021-07-28 ENCOUNTER — APPOINTMENT (OUTPATIENT)
Dept: PHYSICAL THERAPY | Age: 56
End: 2021-07-28
Attending: ORTHOPAEDIC SURGERY
Payer: COMMERCIAL

## 2021-08-02 ENCOUNTER — APPOINTMENT (OUTPATIENT)
Dept: PHYSICAL THERAPY | Age: 56
End: 2021-08-02
Attending: ORTHOPAEDIC SURGERY
Payer: COMMERCIAL

## 2021-08-04 ENCOUNTER — APPOINTMENT (OUTPATIENT)
Dept: PHYSICAL THERAPY | Age: 56
End: 2021-08-04
Attending: ORTHOPAEDIC SURGERY
Payer: COMMERCIAL

## 2021-08-30 ENCOUNTER — TELEPHONE (OUTPATIENT)
Dept: GASTROENTEROLOGY | Facility: CLINIC | Age: 56
End: 2021-08-30

## 2021-08-30 NOTE — TELEPHONE ENCOUNTER
----- Message from Jesus Monk RN sent at 9/30/2016  4:28 PM CDT -----  Regarding: CLN recall  5 year CLN recall, per Dr. Isabella Felty; CLN done 9/28/16

## 2021-10-26 ENCOUNTER — MED REC SCAN ONLY (OUTPATIENT)
Dept: FAMILY MEDICINE CLINIC | Facility: CLINIC | Age: 56
End: 2021-10-26

## 2021-12-07 RX ORDER — VALSARTAN AND HYDROCHLOROTHIAZIDE 160; 12.5 MG/1; MG/1
1 TABLET, FILM COATED ORAL DAILY
Qty: 90 TABLET | Refills: 0 | Status: SHIPPED | OUTPATIENT
Start: 2021-12-07 | End: 2022-03-03

## 2021-12-07 NOTE — TELEPHONE ENCOUNTER
CSS, please contact patient and assist in scheduling follow up office visit for chronic conditions or a physical. [employer] message sent informing pt. Refilled per [employer] [facility] protocol. 90 day refill given on 12/07/21, appointment needed for further refills.     Requested Prescriptions   Pending Prescriptions Disp Refills    VALSARTAN-HYDROCHLOROTHIAZIDE 160-12.5 MG Oral Tab [Pharmacy Med Name: Soledad Lovell 160-12.5 MG TAB] 90 tablet 1     Sig: TAKE 1 TABLET BY MOUTH EVERY DAY        Hypertensive Medications Protocol Failed - 12/7/2021 12:02 AM        Failed - Appointment in past 6 or next 3 months        Passed - CMP or BMP in past 12 months        Passed - GFR Non- > 50     Lab Results   Component Value Date    GFRNAA 91 03/13/2021                     Recent Outpatient Visits              4 months ago Acute pain of right knee    [address] [place]    Office Visit    4 months ago Acute pain of right knee    [address] [place]    Office Visit    5 months ago Aftercare following surgery of the musculoskeletal system    Orthopaedics - Lynette Bryan MD    Office Visit    6 months ago Acute pain of right knee    Orthopaedics - Jenniffer Menon MD    Office Visit    6 months ago Acute pain of right shoulder    Physical Therapy - [address] [place] [address] [address] Ed[address] Rolan [place]    Office Visit

## 2022-01-10 ENCOUNTER — MED REC SCAN ONLY (OUTPATIENT)
Dept: FAMILY MEDICINE CLINIC | Facility: CLINIC | Age: 57
End: 2022-01-10

## 2022-01-30 ENCOUNTER — HOSPITAL ENCOUNTER (OUTPATIENT)
Age: 57
Discharge: HOME | End: 2022-01-30
Attending: EMERGENCY MEDICINE
Payer: COMMERCIAL

## 2022-01-30 ENCOUNTER — APPOINTMENT (OUTPATIENT)
Dept: CT IMAGING | Age: 57
End: 2022-01-30
Attending: EMERGENCY MEDICINE
Payer: COMMERCIAL

## 2022-01-30 VITALS
RESPIRATION RATE: 16 BRPM | DIASTOLIC BLOOD PRESSURE: 115 MMHG | TEMPERATURE: 98 F | HEART RATE: 81 BPM | SYSTOLIC BLOOD PRESSURE: 196 MMHG | OXYGEN SATURATION: 99 %

## 2022-01-30 DIAGNOSIS — M54.17 LUMBOSACRAL RADICULOPATHY: Primary | ICD-10-CM

## 2022-01-30 LAB
BILIRUB UR QL STRIP: NEGATIVE
CLARITY UR: CLEAR
COLOR UR: YELLOW
GLUCOSE UR STRIP-MCNC: NEGATIVE MG/DL
HGB UR QL STRIP: NEGATIVE
KETONES UR STRIP-MCNC: NEGATIVE MG/DL
LEUKOCYTE ESTERASE UR QL STRIP: NEGATIVE
NITRITE UR QL STRIP: NEGATIVE
PROT UR STRIP-MCNC: NEGATIVE MG/DL
UROBILINOGEN UR STRIP-ACNC: <2 MG/DL

## 2022-01-30 PROCEDURE — 99213 OFFICE O/P EST LOW 20 MIN: CPT

## 2022-01-30 PROCEDURE — 99214 OFFICE O/P EST MOD 30 MIN: CPT

## 2022-01-30 PROCEDURE — 81002 URINALYSIS NONAUTO W/O SCOPE: CPT

## 2022-01-30 PROCEDURE — 74176 CT ABD & PELVIS W/O CONTRAST: CPT | Performed by: EMERGENCY MEDICINE

## 2022-01-30 RX ORDER — TRAMADOL HYDROCHLORIDE 50 MG/1
50 TABLET ORAL EVERY 8 HOURS PRN
Qty: 15 TABLET | Refills: 0 | Status: SHIPPED | OUTPATIENT
Start: 2022-01-30

## 2022-01-30 RX ORDER — METHYLPREDNISOLONE 4 MG/1
TABLET ORAL
Qty: 1 EACH | Refills: 0 | Status: SHIPPED | OUTPATIENT
Start: 2022-01-30

## 2022-01-30 NOTE — ED INITIAL ASSESSMENT (HPI)
Patient with Right hip pain that started 1/28. Patient with hx of multiple back surgeries. States pain radiates to his right groin, low abdomen and right flank. States there is swelling in his groin. Patient denies fevers. Pain is 7/10, tried Lidocaine patch without relief.

## 2022-02-16 ENCOUNTER — TELEPHONE (OUTPATIENT)
Dept: NEUROLOGY | Facility: CLINIC | Age: 57
End: 2022-02-16

## 2022-02-16 ENCOUNTER — OFFICE VISIT (OUTPATIENT)
Dept: PHYSICAL MEDICINE AND REHAB | Facility: CLINIC | Age: 57
End: 2022-02-16
Payer: COMMERCIAL

## 2022-02-16 VITALS — OXYGEN SATURATION: 99 % | HEART RATE: 96 BPM | BODY MASS INDEX: 39.24 KG/M2 | WEIGHT: 250 LBS | HEIGHT: 67 IN

## 2022-02-16 DIAGNOSIS — R53.1 WEAKNESS: ICD-10-CM

## 2022-02-16 DIAGNOSIS — G47.00 INSOMNIA, UNSPECIFIED TYPE: ICD-10-CM

## 2022-02-16 DIAGNOSIS — R20.2 NUMBNESS AND TINGLING: ICD-10-CM

## 2022-02-16 DIAGNOSIS — R20.0 NUMBNESS AND TINGLING: ICD-10-CM

## 2022-02-16 DIAGNOSIS — R29.2 HYPERREFLEXIA: ICD-10-CM

## 2022-02-16 DIAGNOSIS — R12 HEARTBURN: ICD-10-CM

## 2022-02-16 DIAGNOSIS — M96.1 POSTLAMINECTOMY SYNDROME, LUMBAR: Primary | ICD-10-CM

## 2022-02-16 PROCEDURE — 3008F BODY MASS INDEX DOCD: CPT | Performed by: PHYSICAL MEDICINE & REHABILITATION

## 2022-02-16 PROCEDURE — 99244 OFF/OP CNSLTJ NEW/EST MOD 40: CPT | Performed by: PHYSICAL MEDICINE & REHABILITATION

## 2022-02-16 RX ORDER — CYCLOBENZAPRINE HCL 10 MG
10 TABLET ORAL NIGHTLY
Qty: 30 TABLET | Refills: 0 | Status: SHIPPED | OUTPATIENT
Start: 2022-02-16 | End: 2022-03-18

## 2022-02-16 RX ORDER — MELOXICAM 15 MG/1
15 TABLET ORAL DAILY
Qty: 30 TABLET | Refills: 0 | Status: SHIPPED | OUTPATIENT
Start: 2022-02-16 | End: 2022-03-10

## 2022-02-16 NOTE — TELEPHONE ENCOUNTER
[employer] for authorization of approval for MRI SPINE THORACIC (CPT=72146)Authorization # [id number] valid  02/16/2022 - 04/16/2022    [employer] for authorization of approval for MRI SPINE LUMBAR (CPT=72148). Authorization # [id number] valid  02/16/2022 - 04/16/2022  Pt. informed of approvals. Kieran Valentine

## 2022-02-27 PROBLEM — R20.0 NUMBNESS AND TINGLING: Status: ACTIVE | Noted: 2022-02-27

## 2022-02-27 PROBLEM — R53.1 WEAKNESS: Status: ACTIVE | Noted: 2022-02-27

## 2022-02-27 PROBLEM — R20.2 NUMBNESS AND TINGLING: Status: ACTIVE | Noted: 2022-02-27

## 2022-02-27 PROBLEM — M96.1 POSTLAMINECTOMY SYNDROME, LUMBAR: Status: ACTIVE | Noted: 2022-02-27

## 2022-02-27 PROBLEM — R12 HEARTBURN: Status: ACTIVE | Noted: 2022-02-27

## 2022-02-27 PROBLEM — G47.00 INSOMNIA: Status: ACTIVE | Noted: 2022-02-27

## 2022-02-27 PROBLEM — R29.2 HYPERREFLEXIA: Status: ACTIVE | Noted: 2022-02-27

## 2022-03-01 ENCOUNTER — MED REC SCAN ONLY (OUTPATIENT)
Dept: FAMILY MEDICINE CLINIC | Facility: CLINIC | Age: 57
End: 2022-03-01

## 2022-03-01 ENCOUNTER — PATIENT MESSAGE (OUTPATIENT)
Dept: PHYSICAL MEDICINE AND REHAB | Facility: CLINIC | Age: 57
End: 2022-03-01

## 2022-03-02 ENCOUNTER — TELEPHONE (OUTPATIENT)
Dept: PHYSICAL MEDICINE AND REHAB | Facility: CLINIC | Age: 57
End: 2022-03-02

## 2022-03-02 ENCOUNTER — PATIENT MESSAGE (OUTPATIENT)
Dept: PHYSICAL MEDICINE AND REHAB | Facility: CLINIC | Age: 57
End: 2022-03-02

## 2022-03-02 NOTE — TELEPHONE ENCOUNTER
Initiated authorization for Right L1 TFESI CPT [id number] with [employer] automated line  Confirmation [id number].     Status: Approved-authorization is not required per health plan

## 2022-03-02 NOTE — TELEPHONE ENCOUNTER
From: Subhash Chaudhary  To: Delano Gar MD  Sent: 3/1/2022 4:23 PM CST  Subject: MRI    WIll Dr. Tanner be giving me a call to discuss my MRI results or do I need to make an appointment?

## 2022-03-02 NOTE — TELEPHONE ENCOUNTER
From: Naveed Bright  Sent: 3/2/2022 8:38 AM CST  To: Marah Oneill  Subject: MRI    Thank you. I would like to set up an L12 injection. How do I go about setting that up?

## 2022-03-03 RX ORDER — VALSARTAN AND HYDROCHLOROTHIAZIDE 160; 12.5 MG/1; MG/1
1 TABLET, FILM COATED ORAL DAILY
Qty: 30 TABLET | Refills: 0 | Status: SHIPPED | OUTPATIENT
Start: 2022-03-03

## 2022-03-04 NOTE — TELEPHONE ENCOUNTER
Message noted: Chart reviewed and may refill medication as requested times one. Prescription sent to listed pharmacy. Pharmacy to notify patient to make appointment for further refills  Pt notified through \[facility]\"" & [facility] also.

## 2022-03-04 NOTE — TELEPHONE ENCOUNTER
Please review. Protocol failed or has no protocol.     Requested Prescriptions   Pending Prescriptions Disp Refills    VALSARTAN-HYDROCHLOROTHIAZIDE 160-12.5 MG Oral Tab [Pharmacy Med Name: Janis Popela 160-12.5 MG TAB] 90 tablet 0     Sig: TAKE 1 TABLET BY MOUTH EVERY DAY        Hypertensive Medications Protocol Failed - 3/3/2022 12:02 AM        Failed - Appointment in past 6 or next 3 months        Passed - CMP or BMP in past 12 months        Passed - GFR Non- > 50     Lab Results   Component Value Date    GFRNAA 91 03/13/2021                       Recent Outpatient Visits              2 weeks ago Postlaminectomy syndrome, lumbar    [facility] Jayna Nunn MD    Office Visit    7 months ago Acute pain of right knee    [address] [place]    Office Visit    7 months ago Acute pain of right knee    [address] [place]    Office Visit    8 months ago Aftercare following surgery of the musculoskeletal system    Orthopaedics - Marino Graham MD    Office Visit    8 months ago Acute pain of right knee    Orthopaedics - Fela Caballero MD    Office Visit

## 2022-03-07 ENCOUNTER — TELEPHONE (OUTPATIENT)
Dept: NEUROLOGY | Facility: CLINIC | Age: 57
End: 2022-03-07

## 2022-03-07 NOTE — TELEPHONE ENCOUNTER
----- Message from Vanessa Rojas MD sent at 2/28/2022  4:40 PM CST -----  I personally reviewed a lumbar MRI from February 2022 showing an intact L2-L4 PLIF. There is superjacent and subadjacent facet arthropathy and mild foraminal stenosis. Please let the patient know that his thoracic MRI looks good the lumbar MRI also shows the fusion to be intact. If his right side hip and groin pain are the main problem, I would suggest an transforaminal epidural injection at L12 on the right. If he is open to that, please pend me an order and I will sign it. Otherwise he should return to review his films. Thanks.

## 2022-03-08 NOTE — TELEPHONE ENCOUNTER
Patient has been scheduled for Right L2 Transforaminal epidural steroid injection   on 3/31/22 at the [address] with .   -Anesthesia type: Local.  -If receiving MAC or IVC sedation patient will need to get COVID tested 3 days prior even if already vaccinated (order placed by [address]  -If scheduling [facility] and [facility] covid testing required for all procedures whether patient is vaccinated or not. -Patient informed not to eat or drink anything after midnight the night prior to the procedure, if being sedated. -Patient was advised that if he/she does receive the covid vaccine it needs to be at least 2 weeks before or after the injection. -Medications and allergies reviewed. -Patient reminded to hold NSAIDs (Ibuprofen, ASA, Aleve, Naproxen, Mobic etc.) for 3 days prior to [place]  if BMI is greater than 35. For Cervical injections only hold multivitamins, Vitamin E, Fish Oil, Phentermine (Lomaira) for 7 days prior to injection and NSAIDS. -If patient is receiving MAC/IVCS Phentermine Maxene Jiang) will need to be held for 7 days prior to injection.  -If on blood thinner clearance has been received to hold this medication by provider.   -Patient informed he/she will need a  to and from procedure. -[facility] is located in the [facility] 1st floor. Patient may park in the yellow parking. Patient verbalized understanding and agrees with plan.  -----> Scheduled in Epic: Yes  -----> Scheduled in [employer]:  Yes

## 2022-03-10 RX ORDER — MELOXICAM 15 MG/1
15 TABLET ORAL DAILY
Qty: 30 TABLET | Refills: 0 | Status: SHIPPED | OUTPATIENT
Start: 2022-03-10

## 2022-03-31 ENCOUNTER — OFFICE VISIT (OUTPATIENT)
Dept: SURGERY | Facility: CLINIC | Age: 57
End: 2022-03-31

## 2022-03-31 DIAGNOSIS — M54.16 LUMBAR RADICULOPATHY: Primary | ICD-10-CM

## 2022-03-31 PROCEDURE — 64483 NJX AA&/STRD TFRM EPI L/S 1: CPT | Performed by: PHYSICAL MEDICINE & REHABILITATION

## 2022-03-31 NOTE — PROCEDURES
Preoperative Diagnosis:  (M54.16) Lumbar radiculopathy  (primary encounter diagnosis)       Postoperative Diagnosis:  (M54.16) Lumbar radiculopathy  (primary encounter diagnosis)       Procedures: Right L1 Transforaminal epidural steroid injection under fluoroscopic guidance and contrast enhancement. Surgeon:  Alona Mckeon M.D. Anesthesia:  Local      OPERATIVE PROCEDURE:  The patient was consented. He was brought into the operating suite and placed on the fluoroscopy table in the prone position. He was sterilely prepped and draped in routine fashion. The right L1 foramen was identified. The overlying skin was anesthetized. A 22-gauge spinal needle was introduced and directed towards the foramen. Needle position was verified using fluoroscopy and radiographic contrast showing an epidurogram.  There was no withdrawal of blood or CSF from the needle. Radiographic interpretation was that the needle was in proper position for a transforaminal epidural steroid injection. I placed as mixture of 2mL of 6mg/mL Celestone and 2mL of 1% preservative-free lidocaine into the epidural space. The patient tolerated the procedure well without any immediate complications. He was given discharge instructions and is to follow up with me in approximately two weeks.

## 2022-04-04 NOTE — TELEPHONE ENCOUNTER
LMTCB - need condition update before rx refill. Verify with patient that pt needs medication or if automatic refill request from pharmacy.

## 2022-04-05 RX ORDER — MELOXICAM 15 MG/1
15 TABLET ORAL DAILY
Qty: 30 TABLET | Refills: 0 | OUTPATIENT
Start: 2022-04-05

## 2022-04-05 RX ORDER — VALSARTAN AND HYDROCHLOROTHIAZIDE 160; 12.5 MG/1; MG/1
TABLET, FILM COATED ORAL
Qty: 30 TABLET | Refills: 0 | Status: SHIPPED | OUTPATIENT
Start: 2022-04-05

## 2022-04-05 NOTE — TELEPHONE ENCOUNTER
Message noted: Chart reviewed and may refill medication as requested times one. Prescription sent to listed pharmacy. Pharmacy to notify patient to make appointment for further refills  Pt notified through [facility] also.

## 2022-04-14 ENCOUNTER — APPOINTMENT (OUTPATIENT)
Dept: CT IMAGING | Facility: HOSPITAL | Age: 57
End: 2022-04-14
Attending: EMERGENCY MEDICINE
Payer: COMMERCIAL

## 2022-04-14 ENCOUNTER — HOSPITAL ENCOUNTER (EMERGENCY)
Facility: HOSPITAL | Age: 57
Discharge: HOME | End: 2022-04-14
Attending: EMERGENCY MEDICINE
Payer: COMMERCIAL

## 2022-04-14 VITALS
RESPIRATION RATE: 18 BRPM | DIASTOLIC BLOOD PRESSURE: 97 MMHG | WEIGHT: 250 LBS | TEMPERATURE: 98 F | HEIGHT: 68 IN | SYSTOLIC BLOOD PRESSURE: 142 MMHG | OXYGEN SATURATION: 98 % | HEART RATE: 70 BPM | BODY MASS INDEX: 37.89 KG/M2

## 2022-04-14 DIAGNOSIS — N20.1 URETEROLITHIASIS: Primary | ICD-10-CM

## 2022-04-14 LAB
ANION GAP SERPL CALC-SCNC: 2 MMOL/L (ref 0–18)
BASOPHILS # BLD AUTO: 0.03 X10(3) UL (ref 0–0.2)
BASOPHILS NFR BLD AUTO: 0.2 %
BILIRUB UR QL: NEGATIVE
BUN BLD-MCNC: 18 MG/DL (ref 7–18)
BUN/CREAT SERPL: 15 (ref 10–20)
CALCIUM BLD-MCNC: 9.4 MG/DL (ref 8.5–10.1)
CHLORIDE SERPL-SCNC: 108 MMOL/L (ref 98–112)
CO2 SERPL-SCNC: 33 MMOL/L (ref 21–32)
COLOR UR: YELLOW
CREAT BLD-MCNC: 1.2 MG/DL
DEPRECATED RDW RBC AUTO: 47.3 FL (ref 35.1–46.3)
EOSINOPHIL # BLD AUTO: 0.09 X10(3) UL (ref 0–0.7)
EOSINOPHIL NFR BLD AUTO: 0.7 %
ERYTHROCYTE [DISTWIDTH] IN BLOOD BY AUTOMATED COUNT: 13.4 % (ref 11–15)
GLUCOSE BLD-MCNC: 136 MG/DL (ref 70–99)
GLUCOSE UR-MCNC: NEGATIVE MG/DL
HCT VFR BLD AUTO: 44.7 %
HGB BLD-MCNC: 14.8 G/DL
IMM GRANULOCYTES # BLD AUTO: 0.05 X10(3) UL (ref 0–1)
IMM GRANULOCYTES NFR BLD: 0.4 %
KETONES UR-MCNC: NEGATIVE MG/DL
LEUKOCYTE ESTERASE UR QL STRIP.AUTO: NEGATIVE
LYMPHOCYTES # BLD AUTO: 1.39 X10(3) UL (ref 1–4)
LYMPHOCYTES NFR BLD AUTO: 11.3 %
MCH RBC QN AUTO: 31.5 PG (ref 26–34)
MCHC RBC AUTO-ENTMCNC: 33.1 G/DL (ref 31–37)
MCV RBC AUTO: 95.1 FL
MONOCYTES # BLD AUTO: 0.62 X10(3) UL (ref 0.1–1)
MONOCYTES NFR BLD AUTO: 5.1 %
NEUTROPHILS # BLD AUTO: 10.07 X10 (3) UL (ref 1.5–7.7)
NEUTROPHILS # BLD AUTO: 10.07 X10(3) UL (ref 1.5–7.7)
NEUTROPHILS NFR BLD AUTO: 82.3 %
NITRITE UR QL STRIP.AUTO: NEGATIVE
OSMOLALITY SERPL CALC.SUM OF ELEC: 300 MOSM/KG (ref 275–295)
PH UR: 6 [PH] (ref 5–8)
PLATELET # BLD AUTO: 359 10(3)UL (ref 150–450)
POTASSIUM SERPL-SCNC: 3.9 MMOL/L (ref 3.5–5.1)
PROT UR-MCNC: NEGATIVE MG/DL
RBC # BLD AUTO: 4.7 X10(6)UL
RBC #/AREA URNS AUTO: >10 /HPF
SODIUM SERPL-SCNC: 143 MMOL/L (ref 136–145)
SP GR UR STRIP: 1.02 (ref 1–1.03)
UROBILINOGEN UR STRIP-ACNC: <2
VIT C UR-MCNC: NEGATIVE MG/DL
WBC # BLD AUTO: 12.3 X10(3) UL (ref 4–11)

## 2022-04-14 PROCEDURE — 81001 URINALYSIS AUTO W/SCOPE: CPT | Performed by: EMERGENCY MEDICINE

## 2022-04-14 PROCEDURE — 74176 CT ABD & PELVIS W/O CONTRAST: CPT | Performed by: EMERGENCY MEDICINE

## 2022-04-14 PROCEDURE — 85025 COMPLETE CBC W/AUTO DIFF WBC: CPT | Performed by: EMERGENCY MEDICINE

## 2022-04-14 PROCEDURE — 96374 THER/PROPH/DIAG INJ IV PUSH: CPT

## 2022-04-14 PROCEDURE — 96375 TX/PRO/DX INJ NEW DRUG ADDON: CPT

## 2022-04-14 PROCEDURE — 99284 EMERGENCY DEPT VISIT MOD MDM: CPT

## 2022-04-14 PROCEDURE — 80048 BASIC METABOLIC PNL TOTAL CA: CPT | Performed by: EMERGENCY MEDICINE

## 2022-04-14 RX ORDER — TAMSULOSIN HYDROCHLORIDE 0.4 MG/1
0.4 CAPSULE ORAL DAILY
Qty: 7 CAPSULE | Refills: 0 | Status: SHIPPED | OUTPATIENT
Start: 2022-04-14 | End: 2022-04-21

## 2022-04-14 RX ORDER — IBUPROFEN 600 MG/1
600 TABLET ORAL EVERY 8 HOURS PRN
Qty: 30 TABLET | Refills: 0 | Status: SHIPPED | OUTPATIENT
Start: 2022-04-14 | End: 2022-04-21

## 2022-04-14 RX ORDER — KETOROLAC TROMETHAMINE 15 MG/ML
15 INJECTION, SOLUTION INTRAMUSCULAR; INTRAVENOUS ONCE
Status: COMPLETED | OUTPATIENT
Start: 2022-04-14 | End: 2022-04-14

## 2022-04-14 RX ORDER — ONDANSETRON 2 MG/ML
4 INJECTION INTRAMUSCULAR; INTRAVENOUS ONCE
Status: COMPLETED | OUTPATIENT
Start: 2022-04-14 | End: 2022-04-14

## 2022-04-14 RX ORDER — MORPHINE SULFATE 4 MG/ML
4 INJECTION, SOLUTION INTRAMUSCULAR; INTRAVENOUS ONCE
Status: COMPLETED | OUTPATIENT
Start: 2022-04-14 | End: 2022-04-14

## 2022-04-14 RX ORDER — HYDROCODONE BITARTRATE AND ACETAMINOPHEN 5; 325 MG/1; MG/1
1 TABLET ORAL EVERY 6 HOURS PRN
Qty: 10 TABLET | Refills: 0 | Status: SHIPPED | OUTPATIENT
Start: 2022-04-14 | End: 2022-04-21

## 2022-04-14 RX ORDER — ONDANSETRON 4 MG/1
4 TABLET, ORALLY DISINTEGRATING ORAL EVERY 4 HOURS PRN
Qty: 10 TABLET | Refills: 0 | Status: SHIPPED | OUTPATIENT
Start: 2022-04-14 | End: 2022-04-21

## 2022-04-14 NOTE — ED INITIAL ASSESSMENT (HPI)
Pt from home with complaint of sudden onset of RLQ abdominal pain with one episode of vomiting this morning.

## 2022-05-02 RX ORDER — VALSARTAN AND HYDROCHLOROTHIAZIDE 160; 12.5 MG/1; MG/1
TABLET, FILM COATED ORAL
Qty: 30 TABLET | Refills: 0 | Status: SHIPPED | OUTPATIENT
Start: 2022-05-02

## 2022-05-02 NOTE — TELEPHONE ENCOUNTER
Message noted: Chart reviewed and may refill medication as requested times one. Prescription sent to listed pharmacy. Pharmacy to notify patient to make appointment for further refills  Pt notified through hospitals & [facility] SERVICES also.

## 2022-05-02 NOTE — TELEPHONE ENCOUNTER
Patient responded:       From: Temo Marquez  To: Catherine Rinaldi MD  Sent: 5/1/2022  7:51 PM CDT  Subject: Meds and appointment    I scheduled an appointment to see Dr. Amanda Klein on May 28th to review meds and address my issues with allergies. Please do not fill a prescription request from [facility] to renew my blood pressure meds. [facility] constantly asks Dr. Amanda Klein for a refill before I can schedule and appointment to see him. I will be in!       Future Appointments   Date Time Provider Wade Jiang   5/28/2022 10:20 AM Gorge Pena MD [facility]

## 2022-05-20 ENCOUNTER — PATIENT MESSAGE (OUTPATIENT)
Dept: FAMILY MEDICINE CLINIC | Facility: CLINIC | Age: 57
End: 2022-05-20

## 2022-05-20 LAB — AMB EXT COVID-19 RESULT: DETECTED

## 2022-05-20 NOTE — TELEPHONE ENCOUNTER
Kenny Euceda RN 5/20/2022 9:51 AM CDT        ----- Message -----  From: Rosy Mcardle  Sent: 5/20/2022 5:25 AM CDT  To: Em Rn Triage  Subject: COVID     I tested positive for Covid this morning. I took a home test. Feeling achy, a little sore throat and a temp of 100.  Let me know if I need to do anything or report my case anywhere

## 2022-05-28 ENCOUNTER — VIRTUAL PHONE E/M (OUTPATIENT)
Dept: FAMILY MEDICINE CLINIC | Facility: CLINIC | Age: 57
End: 2022-05-28
Payer: COMMERCIAL

## 2022-05-28 DIAGNOSIS — U07.1 COVID-19: Primary | ICD-10-CM

## 2022-05-28 DIAGNOSIS — I10 ESSENTIAL HYPERTENSION: ICD-10-CM

## 2022-05-28 PROCEDURE — 99441 PHONE E/M BY PHYS 5-10 MIN: CPT | Performed by: FAMILY MEDICINE

## 2022-05-28 RX ORDER — AMLODIPINE BESYLATE 5 MG/1
5 TABLET ORAL DAILY
Qty: 90 TABLET | Refills: 3 | Status: SHIPPED | OUTPATIENT
Start: 2022-05-28

## 2022-05-28 RX ORDER — VALSARTAN AND HYDROCHLOROTHIAZIDE 160; 12.5 MG/1; MG/1
1 TABLET, FILM COATED ORAL DAILY
Qty: 90 TABLET | Refills: 3 | Status: SHIPPED | OUTPATIENT
Start: 2022-05-28

## 2022-06-20 ENCOUNTER — OFFICE VISIT (OUTPATIENT)
Dept: FAMILY MEDICINE CLINIC | Facility: CLINIC | Age: 57
End: 2022-06-20
Payer: COMMERCIAL

## 2022-06-20 VITALS
WEIGHT: 248 LBS | BODY MASS INDEX: 37.59 KG/M2 | DIASTOLIC BLOOD PRESSURE: 98 MMHG | HEIGHT: 68 IN | SYSTOLIC BLOOD PRESSURE: 171 MMHG | HEART RATE: 87 BPM

## 2022-06-20 DIAGNOSIS — H65.03 BILATERAL ACUTE SEROUS OTITIS MEDIA, RECURRENCE NOT SPECIFIED: Primary | ICD-10-CM

## 2022-06-20 PROCEDURE — 3080F DIAST BP >= 90 MM HG: CPT | Performed by: FAMILY MEDICINE

## 2022-06-20 PROCEDURE — 99213 OFFICE O/P EST LOW 20 MIN: CPT | Performed by: FAMILY MEDICINE

## 2022-06-20 PROCEDURE — 3077F SYST BP >= 140 MM HG: CPT | Performed by: FAMILY MEDICINE

## 2022-06-20 PROCEDURE — 3008F BODY MASS INDEX DOCD: CPT | Performed by: FAMILY MEDICINE

## 2022-06-20 RX ORDER — AMOXICILLIN 875 MG/1
875 TABLET, COATED ORAL 2 TIMES DAILY
Qty: 20 TABLET | Refills: 0 | Status: SHIPPED | OUTPATIENT
Start: 2022-06-20

## 2022-06-20 RX ORDER — METHYLPREDNISOLONE 4 MG/1
TABLET ORAL
Qty: 1 EACH | Refills: 0 | Status: SHIPPED | OUTPATIENT
Start: 2022-06-20

## 2022-10-20 ENCOUNTER — TELEPHONE (OUTPATIENT)
Dept: GASTROENTEROLOGY | Facility: CLINIC | Age: 57
End: 2022-10-20

## 2022-10-20 ENCOUNTER — OFFICE VISIT (OUTPATIENT)
Dept: GASTROENTEROLOGY | Facility: CLINIC | Age: 57
End: 2022-10-20
Payer: COMMERCIAL

## 2022-10-20 VITALS
DIASTOLIC BLOOD PRESSURE: 94 MMHG | HEIGHT: 68 IN | SYSTOLIC BLOOD PRESSURE: 144 MMHG | WEIGHT: 246 LBS | BODY MASS INDEX: 37.28 KG/M2

## 2022-10-20 DIAGNOSIS — Z80.0 FAMILY HISTORY OF COLON CANCER: ICD-10-CM

## 2022-10-20 DIAGNOSIS — K21.9 GASTROESOPHAGEAL REFLUX DISEASE, UNSPECIFIED WHETHER ESOPHAGITIS PRESENT: ICD-10-CM

## 2022-10-20 DIAGNOSIS — Z80.0 FAMILY HISTORY OF ESOPHAGEAL CANCER: ICD-10-CM

## 2022-10-20 DIAGNOSIS — Z86.010 HISTORY OF COLON POLYPS: ICD-10-CM

## 2022-10-20 DIAGNOSIS — Z12.12 SCREENING FOR COLORECTAL CANCER: Primary | ICD-10-CM

## 2022-10-20 DIAGNOSIS — R13.19 ESOPHAGEAL DYSPHAGIA: ICD-10-CM

## 2022-10-20 DIAGNOSIS — Z86.010 PERSONAL HISTORY OF COLONIC POLYPS: ICD-10-CM

## 2022-10-20 DIAGNOSIS — Z12.11 SCREEN FOR COLON CANCER: Primary | ICD-10-CM

## 2022-10-20 DIAGNOSIS — Z12.11 SCREENING FOR COLORECTAL CANCER: Primary | ICD-10-CM

## 2022-10-20 PROCEDURE — 3080F DIAST BP >= 90 MM HG: CPT | Performed by: INTERNAL MEDICINE

## 2022-10-20 PROCEDURE — 3008F BODY MASS INDEX DOCD: CPT | Performed by: INTERNAL MEDICINE

## 2022-10-20 PROCEDURE — S0285 CNSLT BEFORE SCREEN COLONOSC: HCPCS | Performed by: INTERNAL MEDICINE

## 2022-10-20 PROCEDURE — 3077F SYST BP >= 140 MM HG: CPT | Performed by: INTERNAL MEDICINE

## 2022-10-20 RX ORDER — FLUTICASONE PROPIONATE 50 MCG
SPRAY, SUSPENSION (ML) NASAL
COMMUNITY
Start: 2022-10-14

## 2022-10-20 NOTE — TELEPHONE ENCOUNTER
Scheduled for:  Colonoscopy [phone] & EGD 80803 with possible dilation  Provider Name:  Dr. Isabel Phillips  Date:  02/03/2023  Location:  [address] [place] [address] 1  Sedation:  MAC  Time:  10:30 am, arrival 9:30 am  Prep:  Golytely  Meds/Allergies Reconciled?:  Physician reviewed  Diagnosis with codes:  Screening for colon cancer Z12.11; Hx of colon polyps Z86.010; Family hx of colon cancer Z80.0; Family hx of esophageal cancer Z80.0; GERD K21.9; Esophageal dysphagia R13.19  Was patient informed to call insurance with codes (Y/N):  Yes  Referral sent?:  Referral was sent at the time of electronic surgical scheduling. [facility] or [address] notified?:  I sent an electronic request to Endo Scheduling and received a confirmation today. Medication Orders:  Hold Valsartan the morning of procedure. Misc Orders: Patient was informed that they will need a COVID 19 test prior to their procedure. Patient verbally understood & will await a phone call from [facility] to schedule. Further instructions given by staff:  I provided patient with prep instruction sheet.

## 2022-10-20 NOTE — PATIENT INSTRUCTIONS
1. Schedule colonoscopy and EGD with possible dilatation with monitored anesthesia care (MAC) at [address] or [place]    2.  bowel prep from pharmacy (split trilyte or golytely). As we discussed it is important to take the bowel preparation in two parts taking 2L of the liquid the night before the procedure and the second 2L the morning of the procedure starting approximately 6 hours prior to your scheduled procedure time. 3. Continue all medications for procedure    4. Read all bowel prep instructions carefully    5. AVOID seeds, nuts, popcorn, raw fruits and vegetables (cooked is okay) for 2-3 days before procedure    >>>Please note: if you were prescribed a bowel prep and it is too expensive or not covered by insurance, it is okay to substitute Trilyte or Golytely (or any similar generic prep). This can be done by notifying the pharmacy or calling our office. 6. You will also need to get tested for COVID within 72 hours prior to the procedure. You should be contacted regarding the instructions for this.

## 2022-12-21 ENCOUNTER — OFFICE VISIT (OUTPATIENT)
Dept: ALLERGY | Facility: CLINIC | Age: 57
End: 2022-12-21
Payer: COMMERCIAL

## 2022-12-21 ENCOUNTER — NURSE ONLY (OUTPATIENT)
Dept: ALLERGY | Facility: CLINIC | Age: 57
End: 2022-12-21
Payer: COMMERCIAL

## 2022-12-21 VITALS
BODY MASS INDEX: 38.19 KG/M2 | DIASTOLIC BLOOD PRESSURE: 82 MMHG | WEIGHT: 252 LBS | HEIGHT: 68 IN | SYSTOLIC BLOOD PRESSURE: 167 MMHG | HEART RATE: 108 BPM | OXYGEN SATURATION: 96 %

## 2022-12-21 DIAGNOSIS — H10.10 SEASONAL AND PERENNIAL ALLERGIC RHINOCONJUNCTIVITIS: Primary | ICD-10-CM

## 2022-12-21 DIAGNOSIS — J30.2 SEASONAL AND PERENNIAL ALLERGIC RHINOCONJUNCTIVITIS: ICD-10-CM

## 2022-12-21 DIAGNOSIS — Z92.29 COVID-19 VACCINE SERIES COMPLETED: ICD-10-CM

## 2022-12-21 DIAGNOSIS — H10.10 SEASONAL AND PERENNIAL ALLERGIC RHINOCONJUNCTIVITIS: ICD-10-CM

## 2022-12-21 DIAGNOSIS — J30.89 SEASONAL AND PERENNIAL ALLERGIC RHINOCONJUNCTIVITIS: Primary | ICD-10-CM

## 2022-12-21 DIAGNOSIS — J30.89 SEASONAL AND PERENNIAL ALLERGIC RHINOCONJUNCTIVITIS: ICD-10-CM

## 2022-12-21 DIAGNOSIS — H69.83 DYSFUNCTION OF BOTH EUSTACHIAN TUBES: ICD-10-CM

## 2022-12-21 DIAGNOSIS — Z23 FLU VACCINE NEED: ICD-10-CM

## 2022-12-21 DIAGNOSIS — J30.2 SEASONAL AND PERENNIAL ALLERGIC RHINOCONJUNCTIVITIS: Primary | ICD-10-CM

## 2022-12-21 PROCEDURE — 90686 IIV4 VACC NO PRSV 0.5 ML IM: CPT | Performed by: ALLERGY & IMMUNOLOGY

## 2022-12-21 PROCEDURE — 3008F BODY MASS INDEX DOCD: CPT | Performed by: ALLERGY & IMMUNOLOGY

## 2022-12-21 PROCEDURE — 95004 PERQ TESTS W/ALRGNC XTRCS: CPT | Performed by: ALLERGY & IMMUNOLOGY

## 2022-12-21 PROCEDURE — 90471 IMMUNIZATION ADMIN: CPT | Performed by: ALLERGY & IMMUNOLOGY

## 2022-12-21 PROCEDURE — 95024 IQ TESTS W/ALLERGENIC XTRCS: CPT | Performed by: ALLERGY & IMMUNOLOGY

## 2022-12-21 PROCEDURE — 3079F DIAST BP 80-89 MM HG: CPT | Performed by: ALLERGY & IMMUNOLOGY

## 2022-12-21 PROCEDURE — 99244 OFF/OP CNSLTJ NEW/EST MOD 40: CPT | Performed by: ALLERGY & IMMUNOLOGY

## 2022-12-21 PROCEDURE — 3077F SYST BP >= 140 MM HG: CPT | Performed by: ALLERGY & IMMUNOLOGY

## 2022-12-21 RX ORDER — MONTELUKAST SODIUM 10 MG/1
10 TABLET ORAL NIGHTLY
Qty: 30 TABLET | Refills: 0 | Status: SHIPPED | OUTPATIENT
Start: 2022-12-21

## 2022-12-21 RX ORDER — AZELASTINE 1 MG/ML
2 SPRAY, METERED NASAL DAILY
Qty: 1 EACH | Refills: 0 | Status: SHIPPED | OUTPATIENT
Start: 2022-12-21

## 2022-12-21 NOTE — PATIENT INSTRUCTIONS
1 allergic rhinitis. Most common symptoms include postnasal drip and ear congestion. Trial of Xyzal, levocetirizine 5 mg once a day as an antihistamine  Continue with Flonase 2 sprays per nostril once a day. Add Singulair, montelukast 10 mg once a day if not improving over the next week  Add Astelin nasal spray as an antihistamine nasal spray 2 sprays per nostril twice a day in 2 weeks if not improving  See above skin testing to screen for allergic triggers.   Reviewed avoidance measures and potential treatment option immunotherapy    #2 eustachian tube tube dysfunction  Diagnosis discussed  See above #1 for treatment including Singulair Flonase  Patient avoid Sudafed due to hypertension  Reviewed may discuss potential ear tubes or tympanotomy with ENT if not improving with above recommendations    #3 flu vaccine  given today     #4 COVID vaccines up-to-date

## 2022-12-28 ENCOUNTER — TELEPHONE (OUTPATIENT)
Dept: FAMILY MEDICINE CLINIC | Facility: CLINIC | Age: 57
End: 2022-12-28

## 2022-12-28 NOTE — TELEPHONE ENCOUNTER
----- Message from Alma Penaloza RN sent at 12/28/2022 12:32 PM CST -----  Regarding: FW: Medication      ----- Message -----  From: Zack Roberts  Sent: 12/27/2022   8:14 PM CST  To: [facility] Rn Triage  Subject: Medication                                       I have had ringing in my ears since summer. I have seen an allergist and an ENT. No one can find a cause. I did start Amlodipine in May. Could this be the cause? Is there a different med I can take to test whether or not it is the med? 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

## 2023-01-13 RX ORDER — MONTELUKAST SODIUM 10 MG/1
TABLET ORAL
Qty: 30 TABLET | Refills: 0 | Status: SHIPPED | OUTPATIENT
Start: 2023-01-13

## 2023-02-01 ENCOUNTER — TELEPHONE (OUTPATIENT)
Dept: GASTROENTEROLOGY | Facility: CLINIC | Age: 58
End: 2023-02-01

## 2023-02-01 ENCOUNTER — LAB ENCOUNTER (OUTPATIENT)
Dept: LAB | Age: 58
End: 2023-02-01
Attending: INTERNAL MEDICINE
Payer: COMMERCIAL

## 2023-02-01 DIAGNOSIS — Z86.010 PERSONAL HISTORY OF COLONIC POLYPS: ICD-10-CM

## 2023-02-01 DIAGNOSIS — Z80.0 FAMILY HISTORY OF ESOPHAGEAL CANCER: ICD-10-CM

## 2023-02-01 DIAGNOSIS — R13.19 ESOPHAGEAL DYSPHAGIA: ICD-10-CM

## 2023-02-01 DIAGNOSIS — Z80.0 FAMILY HISTORY OF COLON CANCER: ICD-10-CM

## 2023-02-01 DIAGNOSIS — Z01.818 PRE-OP TESTING: ICD-10-CM

## 2023-02-01 DIAGNOSIS — K21.9 GASTROESOPHAGEAL REFLUX DISEASE, UNSPECIFIED WHETHER ESOPHAGITIS PRESENT: ICD-10-CM

## 2023-02-01 DIAGNOSIS — Z12.11 COLON CANCER SCREENING: Primary | ICD-10-CM

## 2023-02-01 LAB — SARS-COV-2 RNA RESP QL NAA+PROBE: NOT DETECTED

## 2023-02-01 NOTE — TELEPHONE ENCOUNTER
Pt's procedures canceled due to provider availability. Canceled in [address]. Pt notified of cancellation on VM box. Message sent via [employer] as well. Please transfer to Zaheer Koehler at ext [phone] for rescheduling. Canceled for:  Colonoscopy 52950 & EGD 67734 with possible dilation  Provider Name:  Dr. Alondra Higgins  Date:  02/03/2023  Location:  [address] [place] [address]  Sedation:  MAC  Time:  10:30 am, arrival 9:30 am  Prep:  Golytely  Meds/Allergies Reconciled?:  Physician reviewed  Diagnosis with codes:  Screening for colon cancer Z12.11; Hx of colon polyps Z86.010; Family hx of colon cancer Z80.0;  Family hx of esophageal cancer Z80.0; GERD K21.9; Esophageal dysphagia R13.19

## 2023-02-07 RX ORDER — AZELASTINE HYDROCHLORIDE 137 UG/1
SPRAY, METERED NASAL
Qty: 30 ML | Refills: 2 | Status: SHIPPED | OUTPATIENT
Start: 2023-02-07

## 2023-02-09 RX ORDER — MONTELUKAST SODIUM 10 MG/1
TABLET ORAL
Qty: 30 TABLET | Refills: 0 | Status: SHIPPED | OUTPATIENT
Start: 2023-02-09

## 2023-02-16 ENCOUNTER — OFFICE VISIT (OUTPATIENT)
Dept: FAMILY MEDICINE CLINIC | Facility: CLINIC | Age: 58
End: 2023-02-16

## 2023-02-16 VITALS
HEART RATE: 102 BPM | HEIGHT: 68 IN | TEMPERATURE: 98 F | BODY MASS INDEX: 37.59 KG/M2 | RESPIRATION RATE: 20 BRPM | DIASTOLIC BLOOD PRESSURE: 88 MMHG | SYSTOLIC BLOOD PRESSURE: 151 MMHG | WEIGHT: 248 LBS

## 2023-02-16 DIAGNOSIS — J01.90 ACUTE SINUSITIS, RECURRENCE NOT SPECIFIED, UNSPECIFIED LOCATION: Primary | ICD-10-CM

## 2023-02-16 PROCEDURE — 3079F DIAST BP 80-89 MM HG: CPT | Performed by: FAMILY MEDICINE

## 2023-02-16 PROCEDURE — 3077F SYST BP >= 140 MM HG: CPT | Performed by: FAMILY MEDICINE

## 2023-02-16 PROCEDURE — 3008F BODY MASS INDEX DOCD: CPT | Performed by: FAMILY MEDICINE

## 2023-02-16 PROCEDURE — 99213 OFFICE O/P EST LOW 20 MIN: CPT | Performed by: FAMILY MEDICINE

## 2023-02-16 RX ORDER — AMOXICILLIN 875 MG/1
875 TABLET, COATED ORAL 2 TIMES DAILY
Qty: 20 TABLET | Refills: 0 | Status: SHIPPED | OUTPATIENT
Start: 2023-02-16

## 2023-02-24 NOTE — TELEPHONE ENCOUNTER
Spoke to pt, offered him a date in May, he declined. Pt states he will be finding a new GI practice. Nothing further needed. TE closed.

## 2023-03-22 ENCOUNTER — APPOINTMENT (OUTPATIENT)
Dept: GENERAL RADIOLOGY | Age: 58
End: 2023-03-22
Attending: NURSE PRACTITIONER
Payer: COMMERCIAL

## 2023-03-22 ENCOUNTER — HOSPITAL ENCOUNTER (OUTPATIENT)
Age: 58
Discharge: HOME | End: 2023-03-22
Payer: COMMERCIAL

## 2023-03-22 VITALS
TEMPERATURE: 99 F | DIASTOLIC BLOOD PRESSURE: 83 MMHG | HEART RATE: 87 BPM | SYSTOLIC BLOOD PRESSURE: 137 MMHG | OXYGEN SATURATION: 100 % | RESPIRATION RATE: 18 BRPM

## 2023-03-22 DIAGNOSIS — J06.9 VIRAL URI WITH COUGH: Primary | ICD-10-CM

## 2023-03-22 LAB
ATRIAL RATE: 81 BPM
BASOPHILS # BLD AUTO: 0.02 X10(3) UL (ref 0–0.2)
BASOPHILS NFR BLD AUTO: 0.3 %
BUN BLD-MCNC: 14 MG/DL (ref 7–18)
CHLORIDE BLD-SCNC: 104 MMOL/L (ref 98–112)
CO2 BLD-SCNC: 26 MMOL/L (ref 21–32)
CREAT BLD-MCNC: 0.7 MG/DL
DEPRECATED RDW RBC AUTO: 44 FL (ref 35.1–46.3)
EOSINOPHIL # BLD AUTO: 0.17 X10(3) UL (ref 0–0.7)
EOSINOPHIL NFR BLD AUTO: 2.5 %
ERYTHROCYTE [DISTWIDTH] IN BLOOD BY AUTOMATED COUNT: 13.2 % (ref 11–15)
GFR SERPLBLD BASED ON 1.73 SQ M-ARVRAT: 109 ML/MIN/1.73M2 (ref 60–?)
GLUCOSE BLD-MCNC: 128 MG/DL (ref 70–99)
HCT VFR BLD AUTO: 39.5 %
HCT VFR BLD AUTO: 40.2 %
HCT VFR BLD CALC: 42 %
HGB BLD-MCNC: 13.7 G/DL
HGB BLD-MCNC: 13.7 G/DL
IMM GRANULOCYTES # BLD AUTO: 0.01 X10(3) UL (ref 0–1)
IMM GRANULOCYTES NFR BLD: 0.1 %
ISTAT IONIZED CALCIUM FOR CHEM 8: 1.16 MMOL/L (ref 1.12–1.32)
LYMPHOCYTES # BLD AUTO: 1.18 X10(3) UL (ref 1–4)
LYMPHOCYTES NFR BLD AUTO: 17.3 %
MCH RBC QN AUTO: 30.9 PG (ref 26–34)
MCH RBC QN AUTO: 31.6 PG (ref 26–34)
MCHC RBC AUTO-ENTMCNC: 34.1 G/DL (ref 31–37)
MCHC RBC AUTO-ENTMCNC: 34.7 G/DL (ref 31–37)
MCV RBC AUTO: 90.7 FL
MCV RBC AUTO: 91 FL (ref 80–100)
MONOCYTES # BLD AUTO: 0.81 X10(3) UL (ref 0.1–1)
MONOCYTES NFR BLD AUTO: 11.9 %
NEUTROPHILS # BLD AUTO: 4.63 X10 (3) UL (ref 1.5–7.7)
NEUTROPHILS # BLD AUTO: 4.63 X10(3) UL (ref 1.5–7.7)
NEUTROPHILS NFR BLD AUTO: 67.9 %
P AXIS: 44 DEGREES
P-R INTERVAL: 162 MS
PLATELET # BLD AUTO: 292 10(3)UL (ref 150–450)
PLATELET # BLD AUTO: 293 X10ˆ3/UL (ref 150–450)
POTASSIUM BLD-SCNC: 3.6 MMOL/L (ref 3.6–5.1)
Q-T INTERVAL: 362 MS
QRS DURATION: 84 MS
QTC CALCULATION (BEZET): 420 MS
R AXIS: 32 DEGREES
RBC # BLD AUTO: 4.34 X10ˆ6/UL
RBC # BLD AUTO: 4.43 X10(6)UL
SODIUM BLD-SCNC: 140 MMOL/L (ref 136–145)
T AXIS: 27 DEGREES
TROPONIN I BLD-MCNC: <0.02 NG/ML
VENTRICULAR RATE: 81 BPM
WBC # BLD AUTO: 6.8 X10(3) UL (ref 4–11)
WBC # BLD AUTO: 6.9 X10ˆ3/UL (ref 4–11)

## 2023-03-22 PROCEDURE — 93010 ELECTROCARDIOGRAM REPORT: CPT

## 2023-03-22 PROCEDURE — 93005 ELECTROCARDIOGRAM TRACING: CPT

## 2023-03-22 PROCEDURE — 36415 COLL VENOUS BLD VENIPUNCTURE: CPT

## 2023-03-22 PROCEDURE — 99215 OFFICE O/P EST HI 40 MIN: CPT

## 2023-03-22 PROCEDURE — 80047 BASIC METABLC PNL IONIZED CA: CPT

## 2023-03-22 PROCEDURE — 84484 ASSAY OF TROPONIN QUANT: CPT

## 2023-03-22 PROCEDURE — 71046 X-RAY EXAM CHEST 2 VIEWS: CPT | Performed by: NURSE PRACTITIONER

## 2023-03-22 PROCEDURE — 85025 COMPLETE CBC W/AUTO DIFF WBC: CPT | Performed by: NURSE PRACTITIONER

## 2023-03-22 RX ORDER — BENZONATATE 100 MG/1
100 CAPSULE ORAL 3 TIMES DAILY PRN
Qty: 30 CAPSULE | Refills: 0 | Status: SHIPPED | OUTPATIENT
Start: 2023-03-22 | End: 2023-04-21

## 2023-03-22 NOTE — DISCHARGE INSTRUCTIONS
Please take cough medication as prescribed. Cool-mist humidifier at night, steam shower may be beneficial as well. Close follow-up with your primary care provider is recommended. Any worsening symptoms please go to the ER.

## 2023-03-22 NOTE — ED INITIAL ASSESSMENT (HPI)
Cough, congestion, upper mid back pain fever, sob since Saturday, denies cp, had - covid home test last night

## 2023-03-30 ENCOUNTER — OFFICE VISIT (OUTPATIENT)
Dept: FAMILY MEDICINE CLINIC | Facility: CLINIC | Age: 58
End: 2023-03-30

## 2023-03-30 ENCOUNTER — TELEPHONE (OUTPATIENT)
Dept: FAMILY MEDICINE CLINIC | Facility: CLINIC | Age: 58
End: 2023-03-30

## 2023-03-30 VITALS
TEMPERATURE: 98 F | HEIGHT: 68 IN | BODY MASS INDEX: 37.44 KG/M2 | RESPIRATION RATE: 20 BRPM | WEIGHT: 247 LBS | HEART RATE: 103 BPM | SYSTOLIC BLOOD PRESSURE: 149 MMHG | DIASTOLIC BLOOD PRESSURE: 75 MMHG

## 2023-03-30 DIAGNOSIS — M54.41 ACUTE LOW BACK PAIN WITH RIGHT-SIDED SCIATICA, UNSPECIFIED BACK PAIN LATERALITY: Primary | ICD-10-CM

## 2023-03-30 PROCEDURE — 3078F DIAST BP <80 MM HG: CPT | Performed by: FAMILY MEDICINE

## 2023-03-30 PROCEDURE — 3008F BODY MASS INDEX DOCD: CPT | Performed by: FAMILY MEDICINE

## 2023-03-30 PROCEDURE — 99213 OFFICE O/P EST LOW 20 MIN: CPT | Performed by: FAMILY MEDICINE

## 2023-03-30 PROCEDURE — 3077F SYST BP >= 140 MM HG: CPT | Performed by: FAMILY MEDICINE

## 2023-03-30 RX ORDER — CYCLOBENZAPRINE HCL 10 MG
10 TABLET ORAL NIGHTLY
Qty: 15 TABLET | Refills: 0 | Status: SHIPPED | OUTPATIENT
Start: 2023-03-30

## 2023-03-30 RX ORDER — METHYLPREDNISOLONE 4 MG/1
TABLET ORAL
Qty: 1 EACH | Refills: 0 | Status: SHIPPED | OUTPATIENT
Start: 2023-03-30

## 2023-03-30 RX ORDER — HYDROCODONE BITARTRATE AND ACETAMINOPHEN 5; 325 MG/1; MG/1
1 TABLET ORAL EVERY 6 HOURS PRN
Qty: 30 TABLET | Refills: 0 | Status: SHIPPED | OUTPATIENT
Start: 2023-03-30

## 2023-04-20 LAB — DDIMER WHOLE BLOOD: 344 NG/ML DDU (ref ?–400)

## 2023-05-07 NOTE — TELEPHONE ENCOUNTER
Please review. Protocol failed / No Protocol. No further labs pend    Requested Prescriptions   Pending Prescriptions Disp Refills    VALSARTAN-HYDROCHLOROTHIAZIDE 160-12.5 MG Oral Tab [Pharmacy Med Name: VALSARTAN/HCTZ 160MG/12.5MG TABLETS] 90 tablet 3     Sig: TAKE 1 TABLET BY MOUTH EVERY DAY       Hypertensive Medications Protocol Failed - 5/6/2023  3:08 AM        Failed - Last BP reading less than 140/90     BP Readings from Last 1 Encounters:  03/30/23 : 149/75                Failed - CMP or BMP in past 6 months     No results found for this or any previous visit (from the past 4392 hour(s)).               Passed - In person appointment in the past 12 or next 3 months     Recent Outpatient Visits              1 month ago Acute low back pain with right-sided sciatica, unspecified back pain laterality    Avni Kelly MD    Office Visit    2 months ago Acute sinusitis, recurrence not specified, unspecified location    Avni Kelly MD    Office Visit    4 months ago Seasonal and perennial allergic rhinoconjunctivitis    [facility], [address], [place]    Nurse Only    4 months ago Seasonal and perennial allergic rhinoconjunctivitis    [facility] Kirill Eller MD    Office Visit    6 months ago Screening for colorectal cancer    [address], [address] [facility],[address] Floor, Minnie Kenyon MD    Office Visit                      Passed - In person appointment or virtual visit in the past 6 months     Recent Outpatient Visits              1 month ago Acute low back pain with right-sided sciatica, unspecified back pain laterality    Avni Kelly MD    Office Visit    2 months ago Acute sinusitis, recurrence not specified, unspecified location Aruna Garza MD    Office Visit    4 months ago Seasonal and perennial allergic rhinoconjunctivitis    [facility], Broko Adams Gilbert    Nurse Only    4 months ago Seasonal and perennial allergic rhinoconjunctivitis    AnamLan Florentino Phyllistnatty Nurse, MD    Office Visit    6 months ago Screening for colorectal cancer    [address] [facility], James Jacobson MD    Office Visit                      Passed [facility] or GFRNAA > 50     GFR Evaluation  EGFRCR: 109 , resulted on 3/22/2023

## 2023-05-08 RX ORDER — AMLODIPINE BESYLATE 5 MG/1
5 TABLET ORAL DAILY
Qty: 90 TABLET | Refills: 3 | Status: SHIPPED | OUTPATIENT
Start: 2023-05-08

## 2023-05-08 RX ORDER — VALSARTAN AND HYDROCHLOROTHIAZIDE 160; 12.5 MG/1; MG/1
1 TABLET, FILM COATED ORAL DAILY
Qty: 90 TABLET | Refills: 1 | Status: SHIPPED | OUTPATIENT
Start: 2023-05-08

## 2023-05-08 NOTE — TELEPHONE ENCOUNTER
Please review. Protocol Failed or has no Protocol    Requested Prescriptions   Pending Prescriptions Disp Refills    amLODIPine 5 MG Oral Tab 90 tablet 3     Sig: Take 1 tablet (5 mg total) by mouth daily. Hypertensive Medications Protocol Failed - 5/8/2023 10:29 AM        Failed - Last BP reading less than 140/90     BP Readings from Last 1 Encounters:  03/30/23 : 149/75                Failed - CMP or BMP in past 6 months     No results found for this or any previous visit (from the past 4392 hour(s)).               Passed - In person appointment in the past 12 or next 3 months     Recent Outpatient Visits              1 month ago Acute low back pain with right-sided sciatica, unspecified back pain laterality    Sandi Mcnair MD    Office Visit    2 months ago Acute sinusitis, recurrence not specified, unspecified location    Sandi Mcnair MD    Office Visit    4 months ago Seasonal and perennial allergic rhinoconjunctivitis    [facility], [address] [place] [address]    Nurse Only    4 months ago Seasonal and perennial allergic rhinoconjunctivitis    Keysha Harding Morgan Heather Alcazar MD    Office Visit    6 months ago Screening for colorectal cancer    Lizeth Moreno MD    Office Visit                      Passed - In person appointment or virtual visit in the past 6 months     Recent Outpatient Visits              1 month ago Acute low back pain with right-sided sciatica, unspecified back pain laterality    Sandi Mcnair MD    Office Visit    2 months ago Acute sinusitis, recurrence not specified, unspecified location    Sandi Mcnair MD    Office Visit    4 months ago Seasonal and perennial allergic rhinoconjunctivitis    [facility], [address] [place]    Nurse Only    4 months ago Seasonal and perennial allergic rhinoconjunctivitis    Nelma Eisenmenger, [place] Alvira Shope, MD    Office Visit    6 months ago Screening for colorectal cancer    Myrle Jacobson, [employer], [place] Baum[place], MD    Office Visit                      Passed [facility] or GFRNAA > 50     GFR Evaluation  EGFRCR: 109 , resulted on 3/22/2023

## 2023-05-08 NOTE — TELEPHONE ENCOUNTER
Message noted: Chart reviewed and may refill medication as requested. Prescription sent to listed pharmacy. Pharmacy to notify patient.  Pt notified through [facility]

## 2023-08-16 ENCOUNTER — OFFICE VISIT (OUTPATIENT)
Dept: FAMILY MEDICINE CLINIC | Facility: CLINIC | Age: 58
End: 2023-08-16

## 2023-08-16 VITALS
HEART RATE: 91 BPM | TEMPERATURE: 99 F | SYSTOLIC BLOOD PRESSURE: 131 MMHG | RESPIRATION RATE: 18 BRPM | DIASTOLIC BLOOD PRESSURE: 88 MMHG | WEIGHT: 251 LBS | BODY MASS INDEX: 38.04 KG/M2 | HEIGHT: 68 IN

## 2023-08-16 DIAGNOSIS — Z00.00 ROUTINE PHYSICAL EXAMINATION: Primary | ICD-10-CM

## 2023-08-16 DIAGNOSIS — L98.9 SKIN LESION OF SCALP: ICD-10-CM

## 2023-08-16 DIAGNOSIS — Z12.11 COLON CANCER SCREENING: ICD-10-CM

## 2023-08-16 DIAGNOSIS — I10 ESSENTIAL HYPERTENSION: ICD-10-CM

## 2023-08-16 DIAGNOSIS — K21.9 GASTROESOPHAGEAL REFLUX DISEASE, UNSPECIFIED WHETHER ESOPHAGITIS PRESENT: ICD-10-CM

## 2023-08-16 DIAGNOSIS — H93.11 TINNITUS OF RIGHT EAR: ICD-10-CM

## 2023-08-16 PROCEDURE — 3079F DIAST BP 80-89 MM HG: CPT | Performed by: FAMILY MEDICINE

## 2023-08-16 PROCEDURE — 3075F SYST BP GE 130 - 139MM HG: CPT | Performed by: FAMILY MEDICINE

## 2023-08-16 PROCEDURE — 99396 PREV VISIT EST AGE 40-64: CPT | Performed by: FAMILY MEDICINE

## 2023-08-16 PROCEDURE — 3008F BODY MASS INDEX DOCD: CPT | Performed by: FAMILY MEDICINE

## 2023-08-16 NOTE — PROGRESS NOTES
Subjective:   Patient ID: Katie Sommer is a 56year old male. Patient is here for routine physical exam. No acute issues. No significant chronic medical problems. Patient is requesting testing. Diet and exercise have been good. Past medical history, family history, and social history were reviewed. Family hx of esophageal cancer. ? Prostate cancer. Hx of 4 back surgery. Hx of COVID and some tinnitus or right ear. Needs referral        History/Other:   Review of Systems   Constitutional: Negative. Negative for fever. HENT:  Positive for tinnitus. Negative for ear pain. Eyes: Negative. Respiratory: Negative. Negative for cough and shortness of breath. Cardiovascular: Negative. Negative for chest pain. Gastrointestinal: Negative. Negative for blood in stool. Endocrine: Negative. Genitourinary: Negative. Negative for dysuria and hematuria. Musculoskeletal: Negative. Skin: Negative. Allergic/Immunologic: Negative. Neurological: Negative. Negative for dizziness and headaches. Hematological: Negative. Psychiatric/Behavioral: Negative. Negative for dysphoric mood. Current Outpatient Medications   Medication Sig Dispense Refill    valsartan-hydroCHLOROthiazide 160-12.5 MG Oral Tab Take 1 tablet by mouth daily. 90 tablet 1    amLODIPine 5 MG Oral Tab Take 1 tablet (5 mg total) by mouth daily. 90 tablet 3    MONTELUKAST 10 MG Oral Tab TAKE 1 TABLET BY MOUTH EVERY DAY AT NIGHT 30 tablet 0    AZELASTINE  MCG/SPRAY Nasal Solution SPRAY 2 SPRAYS BY NASAL ROUTE DAILY 30 mL 2    fluticasone propionate 50 MCG/ACT Nasal Suspension       Multiple Vitamins-Minerals (MULTIVITAMIN ADULT OR) Take by mouth. Allergies:  Robaxin [Methocarba*    RASH, FEVER, UNKNOWN  Seasonal                TINITUS    Objective:   Physical Exam  Constitutional:       Appearance: Normal appearance. He is well-developed. HENT:      Head: Normocephalic.       Right Ear: Tympanic membrane, ear canal and external ear normal.      Left Ear: Tympanic membrane, ear canal and external ear normal.      Nose: Nose normal.      Mouth/Throat:      Mouth: Mucous membranes are moist.      Pharynx: No oropharyngeal exudate or posterior oropharyngeal erythema. Eyes:      Conjunctiva/sclera: Conjunctivae normal.   Cardiovascular:      Rate and Rhythm: Normal rate and regular rhythm. Pulses: Normal pulses. Heart sounds: Normal heart sounds. Pulmonary:      Effort: Pulmonary effort is normal. No respiratory distress. Breath sounds: Normal breath sounds. No wheezing or rales. Abdominal:      General: Abdomen is flat. There is no distension. Palpations: Abdomen is soft. There is no mass. Tenderness: There is no abdominal tenderness. Musculoskeletal:         General: Normal range of motion. Cervical back: Normal range of motion and neck supple. Skin:     General: Skin is warm. Neurological:      General: No focal deficit present. Mental Status: He is alert and oriented to person, place, and time. Sensory: No sensory deficit. Deep Tendon Reflexes: Reflexes are normal and symmetric. Reflexes normal.   Psychiatric:         Mood and Affect: Mood normal.         Behavior: Behavior normal.         Assessment & Plan:   Routine physical examination:  - Exam is unremarkable. Screening tests were discussed, and after discussion, will check lab work as below. Healthy diet, exercise, and weight were discussed. To call if problems and follow up and further management after testing. Routine follow up. Colon cancer screening/ Gastroesophageal reflux disease, unspecified whether esophagitis present:  - After discussion, will send to GI for further evaluation and treatment; To call if any significant symptoms. Skin lesion of scalp:  - After discussion, will send to dermatology for further evaluation and treatment; To call if any significant symptoms.      Tinnitus of right ear:  - After discussion, will send to ENT for further evaluation and treatment; To call if any significant symptoms. Essential hypertension:  - Stable, Will check lab work as below; Medication reviewed. Follow up and further management after testing. To monitor blood pressure; To call if any persistent elevation of blood pressure; Discussed good diet/activity; Routine follow up in 6-12 months or as needed. Orders Placed This Encounter      Lipid Panel      PSA Total, Screen      CBC, Platelet;  No Differential      Comp Metabolic Panel (14)      Meds This Visit:  Requested Prescriptions      No prescriptions requested or ordered in this encounter       Imaging & Referrals:  José Antonio Becker.  ENT - INTERNAL

## 2023-08-23 RX ORDER — VALSARTAN AND HYDROCHLOROTHIAZIDE 160; 12.5 MG/1; MG/1
1 TABLET, FILM COATED ORAL DAILY
Qty: 90 TABLET | Refills: 3 | Status: SHIPPED | OUTPATIENT
Start: 2023-08-23

## 2023-08-23 NOTE — TELEPHONE ENCOUNTER
Please review. Protocol failed / Has no protocol. [employer] message sent to patient to complete labs pended from [address] [place] [address] 8/16/23. Requested Prescriptions   Pending Prescriptions Disp Refills    VALSARTAN-HYDROCHLOROTHIAZIDE 160-12.5 MG Oral Tab [Pharmacy Med Name: VALSARTAN/HCTZ 160MG/12.5MG TABLETS] 90 tablet 1     Sig: Take 1 tablet by mouth daily. Hypertensive Medications Protocol Failed - 8/23/2023 11:50 AM        Failed - CMP or BMP in past 6 months     No results found for this or any previous visit (from the past 4392 hour(s)).             Passed - In person appointment in the past 12 or next 3 months     Recent Outpatient Visits              1 week ago Routine physical examination    Vickie Gee MD    Office Visit    4 months ago Acute low back pain with right-sided sciatica, unspecified back pain laterality    Vickie Gee MD    Office Visit    6 months ago Acute sinusitis, recurrence not specified, unspecified location    Vickie Gee MD    Office Visit    8 months ago Seasonal and perennial allergic rhinoconjunctivitis    [facility], [address] [place] [address]    Nurse Only    8 months ago Seasonal and perennial allergic rhinoconjunctivitis    [facility] Ted Mike MD    Office Visit          Future Appointments         Provider Department Appt Notes    In 2 weeks Jill Baron MD [address] Dx: Skin lesion of scalp (L98.9)  Signed Referral [place]:               Passed - Last BP reading less than 140/90     BP Readings from Last 1 Encounters:  08/16/23 : 131/88              Passed - In person appointment or virtual visit in the past 6 months     Recent Outpatient Visits              1 week ago Routine physical examination    Avni Sharma MD    Office Visit    4 months ago Acute low back pain with right-sided sciatica, unspecified back pain laterality    Avni Sharma MD    Office Visit    6 months ago Acute sinusitis, recurrence not specified, unspecified location    Avin Sharma MD    Office Visit    8 months ago Seasonal and perennial allergic rhinoconjunctivitis    [facility], [address], [place]    Nurse Only    8 months ago Seasonal and perennial allergic rhinoconjunctivitis    Triston Villarreal [place] Jarvis Juarez MD    Office Visit          Future Appointments         Provider Department Appt Notes    In 2 weeks Kathrine Perez MD [address], [facility] Dx: Skin lesion of scalp (L98.9)  Signed Referral [place]:               Passed - EGFRCR or GFRNAA > 50     GFR Evaluation  EGFRCR: 109 , resulted on 3/22/2023             Future Appointments         Provider Department Appt Notes    In 2 weeks Kathrine Perez MD [address] Dx: Skin lesion of scalp (L98.9)  Signed Referral [place]:           Recent Outpatient Visits              1 week ago Routine physical examination    Avni Sharma MD    Office Visit    4 months ago Acute low back pain with right-sided sciatica, unspecified back pain laterality    Avni Sharma MD    Office Visit    6 months ago Acute sinusitis, recurrence not specified, unspecified location    Avni Sharma MD    Office Visit    8 months ago Seasonal and perennial allergic rhinoconjunctivitis [address]    Nurse Only    8 months ago Seasonal and perennial allergic rhinoconjunctivitis    Lan Zaman MD    Office Visit

## 2023-08-23 NOTE — TELEPHONE ENCOUNTER
Message noted: Chart reviewed and may refill medication as requested. Prescription sent to listed pharmacy. Pharmacy to notify patient.  Pt notified through [facility]

## 2023-08-25 ENCOUNTER — OFFICE VISIT (OUTPATIENT)
Dept: OTOLARYNGOLOGY | Facility: CLINIC | Age: 58
End: 2023-08-25

## 2023-08-25 ENCOUNTER — OFFICE VISIT (OUTPATIENT)
Dept: AUDIOLOGY | Facility: CLINIC | Age: 58
End: 2023-08-25

## 2023-08-25 VITALS — BODY MASS INDEX: 38 KG/M2 | WEIGHT: 251 LBS

## 2023-08-25 DIAGNOSIS — H93.11 RINGING IN EAR, RIGHT: Primary | ICD-10-CM

## 2023-08-25 DIAGNOSIS — H93.11 TINNITUS, RIGHT: ICD-10-CM

## 2023-08-25 DIAGNOSIS — H90.3 ASYMMETRICAL SENSORINEURAL HEARING LOSS: ICD-10-CM

## 2023-08-25 DIAGNOSIS — H90.3 SENSORY HEARING LOSS, BILATERAL: Primary | ICD-10-CM

## 2023-08-25 DIAGNOSIS — H93.11 RIGHT-SIDED TINNITUS: Primary | ICD-10-CM

## 2023-08-25 PROCEDURE — 92567 TYMPANOMETRY: CPT | Performed by: AUDIOLOGIST

## 2023-08-25 PROCEDURE — 92557 COMPREHENSIVE HEARING TEST: CPT | Performed by: AUDIOLOGIST

## 2023-08-26 ENCOUNTER — LAB ENCOUNTER (OUTPATIENT)
Dept: LAB | Age: 58
End: 2023-08-26
Attending: FAMILY MEDICINE
Payer: COMMERCIAL

## 2023-08-26 DIAGNOSIS — Z00.00 ROUTINE PHYSICAL EXAMINATION: ICD-10-CM

## 2023-08-26 LAB
ALBUMIN SERPL-MCNC: 3.6 G/DL (ref 3.4–5)
ALBUMIN/GLOB SERPL: 1.1 {RATIO} (ref 1–2)
ALP LIVER SERPL-CCNC: 23 U/L
ALT SERPL-CCNC: 33 U/L
ANION GAP SERPL CALC-SCNC: 7 MMOL/L (ref 0–18)
AST SERPL-CCNC: 25 U/L (ref 15–37)
BILIRUB SERPL-MCNC: 0.9 MG/DL (ref 0.1–2)
BUN BLD-MCNC: 15 MG/DL (ref 7–18)
BUN/CREAT SERPL: 15.3 (ref 10–20)
CALCIUM BLD-MCNC: 8.9 MG/DL (ref 8.5–10.1)
CHLORIDE SERPL-SCNC: 110 MMOL/L (ref 98–112)
CHOLEST SERPL-MCNC: 200 MG/DL (ref ?–200)
CO2 SERPL-SCNC: 27 MMOL/L (ref 21–32)
COMPLEXED PSA SERPL-MCNC: 0.33 NG/ML (ref ?–4)
CREAT BLD-MCNC: 0.98 MG/DL
DEPRECATED RDW RBC AUTO: 46.5 FL (ref 35.1–46.3)
EGFRCR SERPLBLD CKD-EPI 2021: 91 ML/MIN/1.73M2 (ref 60–?)
ERYTHROCYTE [DISTWIDTH] IN BLOOD BY AUTOMATED COUNT: 13.6 % (ref 11–15)
FASTING PATIENT LIPID ANSWER: YES
FASTING STATUS PATIENT QL REPORTED: YES
GLOBULIN PLAS-MCNC: 3.3 G/DL (ref 2.8–4.4)
GLUCOSE BLD-MCNC: 98 MG/DL (ref 70–99)
HCT VFR BLD AUTO: 42 %
HDLC SERPL-MCNC: 48 MG/DL (ref 40–59)
HGB BLD-MCNC: 13.9 G/DL
LDLC SERPL CALC-MCNC: 133 MG/DL (ref ?–100)
MCH RBC QN AUTO: 30.8 PG (ref 26–34)
MCHC RBC AUTO-ENTMCNC: 33.1 G/DL (ref 31–37)
MCV RBC AUTO: 93.1 FL
NONHDLC SERPL-MCNC: 152 MG/DL (ref ?–130)
OSMOLALITY SERPL CALC.SUM OF ELEC: 299 MOSM/KG (ref 275–295)
PLATELET # BLD AUTO: 326 10(3)UL (ref 150–450)
POTASSIUM SERPL-SCNC: 3.8 MMOL/L (ref 3.5–5.1)
PROT SERPL-MCNC: 6.9 G/DL (ref 6.4–8.2)
RBC # BLD AUTO: 4.51 X10(6)UL
SODIUM SERPL-SCNC: 144 MMOL/L (ref 136–145)
TRIGL SERPL-MCNC: 104 MG/DL (ref 30–149)
VLDLC SERPL CALC-MCNC: 19 MG/DL (ref 0–30)
WBC # BLD AUTO: 7.8 X10(3) UL (ref 4–11)

## 2023-08-26 PROCEDURE — 80061 LIPID PANEL: CPT

## 2023-08-26 PROCEDURE — 85027 COMPLETE CBC AUTOMATED: CPT

## 2023-08-26 PROCEDURE — 36415 COLL VENOUS BLD VENIPUNCTURE: CPT

## 2023-08-26 PROCEDURE — 80053 COMPREHEN METABOLIC PANEL: CPT

## 2023-08-30 ENCOUNTER — HOSPITAL ENCOUNTER (OUTPATIENT)
Dept: CT IMAGING | Age: 58
Discharge: HOME | End: 2023-08-30
Attending: FAMILY MEDICINE

## 2023-08-30 DIAGNOSIS — Z13.6 SCREENING FOR HEART DISEASE: ICD-10-CM

## 2023-09-06 ENCOUNTER — OFFICE VISIT (OUTPATIENT)
Dept: DERMATOLOGY CLINIC | Facility: CLINIC | Age: 58
End: 2023-09-06

## 2023-09-06 DIAGNOSIS — L57.0 MULTIPLE ACTINIC KERATOSES: ICD-10-CM

## 2023-09-06 DIAGNOSIS — L81.4 LENTIGINES: Primary | ICD-10-CM

## 2023-09-06 PROCEDURE — 99203 OFFICE O/P NEW LOW 30 MIN: CPT | Performed by: STUDENT IN AN ORGANIZED HEALTH CARE EDUCATION/TRAINING PROGRAM

## 2023-09-06 PROCEDURE — 17000 DESTRUCT PREMALG LESION: CPT | Performed by: STUDENT IN AN ORGANIZED HEALTH CARE EDUCATION/TRAINING PROGRAM

## 2023-09-06 PROCEDURE — 17003 DESTRUCT PREMALG LES 2-14: CPT | Performed by: STUDENT IN AN ORGANIZED HEALTH CARE EDUCATION/TRAINING PROGRAM

## 2023-09-06 NOTE — PROGRESS NOTES
New Patient    Referred by: No referring provider defined for this encounter. CHIEF COMPLAINT: Lesion of concern     HISTORY OF PRESENT ILLNESS: Katie Sommer is a 56year old male here for evaluation of lesion of concern. 1. Growth   Location: top of scalp  Duration: over 1 year  Signs and symptoms: discolored and rough  Current treatment: moisturizer  Past treatments: none    Personal Dermatologic History  History of skin cancer: No  History of  atypical moles: No    FAMILY HISTORY:  History of melanoma: No    Past Medical History  Past Medical History:   Diagnosis Date    Back pain     Back problem     Calculus of kidney     Cholelithiasis     Esophageal reflux     Hearing impairment     High blood pressure     Neuropathy 06/2019    Right foot drop, and right leg weakness    Problems with swallowing     R/T fusion, liquids and solids occasionally    Screen for colon cancer 09/2016    Repeat in 9/2021 due to family hx; no adenomatous polyps in 2016    Sleep apnea     Unspecified essential hypertension     Visual impairment        REVIEW OF SYSTEMS:  Constitutional: Denies fever, chills, unintentional weight loss. Skin as per HPI    Medications  Current Outpatient Medications   Medication Sig Dispense Refill    valsartan-hydroCHLOROthiazide 160-12.5 MG Oral Tab Take 1 tablet by mouth daily. 90 tablet 3    amLODIPine 5 MG Oral Tab Take 1 tablet (5 mg total) by mouth daily. 90 tablet 3    MONTELUKAST 10 MG Oral Tab TAKE 1 TABLET BY MOUTH EVERY DAY AT NIGHT 30 tablet 0    AZELASTINE  MCG/SPRAY Nasal Solution SPRAY 2 SPRAYS BY NASAL ROUTE DAILY 30 mL 2    fluticasone propionate 50 MCG/ACT Nasal Suspension       Multiple Vitamins-Minerals (MULTIVITAMIN ADULT OR) Take by mouth.          PHYSICAL EXAM:  General: awake, alert, no acute distress  Neuropsych: appropriate mood and affect  Eyes: Sclerae anicteric, without conjunctival injection, eyelids unremarkable  Skin: Skin exam was performed today including the following: scalp. Pertinent findings include:   - with light brown stellate macules   - with pink gritty papules    ASSESSMENT & PLAN:  Pathophysiology of diagnoses discussed with patient. Therapeutic options reviewed. Risks, benefits, and alternatives discussed with patient. Instructions reviewed at length. #Lentigines  - Discussed benign appearance and provided reassurance. No treatment but observation at this time. Follow-up for concerning physical changes or new symptoms.  - Recommend sun protection with spf 30 or higher, sun protective clothing such as wide brimmed hats and long sleeves. Recommend avoiding midday sun (10 am- 3 pm). #Multiple actinic keratoses  - Discussed premalignant etiology and possibility of transformation to [facility]  - Recommended cryotherapy today   - Discussed side effects including redness, swelling, crusting, and discolortion after treatment, wound care with soap/water and vaseline   - Recommend sun protection with spf 30 or higher, sun protective clothing such as wide brimmed hats and long sleeves. Recommend avoiding midday sun (10 am- 3 pm). - Procedure Note Cryosurgery of pre-malignant lesion(s)  Risks, benefits, alternatives, complications, and personnel required for cryosurgery reviewed with patient. Patient verbalizes understanding and wishes to proceed. - Cryosurgery performed with Liquid Nitrogen via cryostat spray gun to Actinic Keratosis . 3 lesion(s) treated. - Patient tolerated well and wound care discussed. Return if lesions fail to fully resolve. #Multiple actinic keratoses  - Discussed premalignant etiology and possibility of transformation to [facility]  - Recommended cryotherapy today   - Discussed side effects including redness, swelling, crusting, and discolortion after treatment, wound care with soap/water and vaseline   - Recommend sun protection with spf 30 or higher, sun protective clothing such as wide brimmed hats and long sleeves. Recommend avoiding midday sun (10 am- 3 pm). - Procedure Note Cryosurgery of pre-malignant lesion(s)  Risks, benefits, alternatives, complications, and personnel required for cryosurgery reviewed with patient. Patient verbalizes understanding and wishes to proceed. - Cryosurgery performed with Liquid Nitrogen via cryostat spray gun to Actinic Keratosis . 3 lesion(s) treated. - Patient tolerated well and wound care discussed. Return if lesions fail to fully resolve.       Return to clinic: 1 year or sooner if something concerning arises or if spots not completely resilved with today's treatment    Delmer Coyle MD

## 2023-09-11 ENCOUNTER — PATIENT MESSAGE (OUTPATIENT)
Dept: AUDIOLOGY | Facility: CLINIC | Age: 58
End: 2023-09-11

## 2023-11-16 ENCOUNTER — OFFICE VISIT (OUTPATIENT)
Dept: AUDIOLOGY | Facility: CLINIC | Age: 58
End: 2023-11-16
Payer: COMMERCIAL

## 2023-11-16 DIAGNOSIS — H90.3 SENSORINEURAL HEARING LOSS (SNHL) OF BOTH EARS: Primary | ICD-10-CM

## 2023-11-16 NOTE — PROGRESS NOTES
Hearing Aid Evaluation    Hermelindo Tom  11/20/1965  [id number]    Hermelindo Tom is a first time user. He reports having difficulty hearing, especially in background noise. He also has bilateral tinnitus for about a year and a half. He reports his father also has hearing loss that began in his 52's. His wife wears hearing aids so he is familiar with them. The following were discussed with Adalid Johnson:    Explanation of Audiogram  Patient's hearing loss  Communication difficulties  Importance of binaural instruments  Performance of noise  Adjustment period and follow up visits    Hearing Aid Features  Automatic selection  Dual microphones  Noise suppression  Compatible with Bluetooth    Charges Associated with Hearing Aids  Hearing aid evaluation  Hearing aid(s)  Payment in full at delivery  Office visits after service agreement ends        Medical clearance was given by Danilo Dial MD    Patient's Choice  Level of technology: Advanced  Fitting: binaural    Hearing Aid Ordered       Right    Left    Phonak Audeo L70-R Phonak Audeo L70-R   Moderate #2 receivers Moderate #2 receivers   [facility] Color: [id number] [facility]                             Patient has sent an appointment for  on December 8th, 2023. He has a $5000 hearing aid benefit.      11/16/2023  HEMANTH Topete

## 2023-11-20 ENCOUNTER — APPOINTMENT (OUTPATIENT)
Dept: GENERAL RADIOLOGY | Age: 58
End: 2023-11-20
Attending: PHYSICIAN ASSISTANT
Payer: COMMERCIAL

## 2023-11-20 ENCOUNTER — HOSPITAL ENCOUNTER (OUTPATIENT)
Age: 58
Discharge: HOME | End: 2023-11-20
Payer: COMMERCIAL

## 2023-11-20 VITALS
RESPIRATION RATE: 18 BRPM | TEMPERATURE: 97 F | OXYGEN SATURATION: 98 % | DIASTOLIC BLOOD PRESSURE: 82 MMHG | HEART RATE: 84 BPM | SYSTOLIC BLOOD PRESSURE: 148 MMHG

## 2023-11-20 DIAGNOSIS — S61.216A LACERATION OF RIGHT LITTLE FINGER WITHOUT FOREIGN BODY WITHOUT DAMAGE TO NAIL, INITIAL ENCOUNTER: Primary | ICD-10-CM

## 2023-11-20 PROCEDURE — 12002 RPR S/N/AX/GEN/TRNK2.6-7.5CM: CPT

## 2023-11-20 PROCEDURE — 99213 OFFICE O/P EST LOW 20 MIN: CPT

## 2023-11-20 PROCEDURE — 73140 X-RAY EXAM OF FINGER(S): CPT | Performed by: PHYSICIAN ASSISTANT

## 2023-11-20 RX ORDER — LIDOCAINE HYDROCHLORIDE 10 MG/ML
5 INJECTION, SOLUTION EPIDURAL; INFILTRATION; INTRACAUDAL; PERINEURAL ONCE
Status: COMPLETED | OUTPATIENT
Start: 2023-11-20 | End: 2023-11-20

## 2023-11-20 NOTE — ED INITIAL ASSESSMENT (HPI)
Patient arrives ambulatory with c/o cutting right 5th finger just PTA on saw while cutting wood. Tdap UTD.

## 2023-11-30 ENCOUNTER — HOSPITAL ENCOUNTER (EMERGENCY)
Facility: HOSPITAL | Age: 58
Discharge: HOME | End: 2023-11-30
Attending: EMERGENCY MEDICINE
Payer: COMMERCIAL

## 2023-11-30 VITALS
RESPIRATION RATE: 17 BRPM | SYSTOLIC BLOOD PRESSURE: 140 MMHG | DIASTOLIC BLOOD PRESSURE: 80 MMHG | TEMPERATURE: 99 F | HEART RATE: 90 BPM | OXYGEN SATURATION: 98 %

## 2023-11-30 DIAGNOSIS — H01.004 BLEPHARITIS OF LEFT UPPER EYELID, UNSPECIFIED TYPE: Primary | ICD-10-CM

## 2023-11-30 DIAGNOSIS — H57.89 RED EYE: ICD-10-CM

## 2023-11-30 PROCEDURE — 99284 EMERGENCY DEPT VISIT MOD MDM: CPT

## 2023-11-30 PROCEDURE — 99283 EMERGENCY DEPT VISIT LOW MDM: CPT

## 2023-11-30 RX ORDER — TOBRAMYCIN AND DEXAMETHASONE 3; 1 MG/ML; MG/ML
2 SUSPENSION/ DROPS OPHTHALMIC
Qty: 1 EACH | Refills: 0 | Status: SHIPPED | OUTPATIENT
Start: 2023-11-30 | End: 2023-12-05

## 2023-11-30 RX ORDER — TETRACAINE HYDROCHLORIDE 5 MG/ML
1 SOLUTION OPHTHALMIC ONCE
Status: COMPLETED | OUTPATIENT
Start: 2023-11-30 | End: 2023-11-30

## 2023-12-01 ENCOUNTER — PATIENT OUTREACH (OUTPATIENT)
Dept: CASE MANAGEMENT | Age: 58
End: 2023-12-01

## 2023-12-01 NOTE — PROGRESS NOTES
1st attempt ER f/up apt request  No answer, LVMTCB to schedule apts  PCP -unable to contact  OPTHALM -unable to contact  Closing encounter

## 2023-12-01 NOTE — ED INITIAL ASSESSMENT (HPI)
He states this am his vision in the left eye only became progressively more blurry and now he states he cannot see as far. Denies floaters, narrowed peripheral vision, dark spots. Some redness, swelling, and dryness to left eye. Mild headache. Denies trauma.

## 2023-12-08 ENCOUNTER — OFFICE VISIT (OUTPATIENT)
Dept: AUDIOLOGY | Facility: CLINIC | Age: 58
End: 2023-12-08
Payer: COMMERCIAL

## 2023-12-08 DIAGNOSIS — H90.3 SENSORINEURAL HEARING LOSS (SNHL) OF BOTH EARS: Primary | ICD-10-CM

## 2023-12-08 PROCEDURE — V5261 HEARING AID, DIGIT, BIN, BTE: HCPCS | Performed by: AUDIOLOGIST

## 2023-12-08 NOTE — PROGRESS NOTES
Hearing Aid Fitting    Bri Serra purchased two hearing aids. The hearing aid fitting was completed by HEMANTH Clifford. Hearing Aid Information       Right    Left    Sheila [facility] L70-R   [place] [id number]   Color: Champagne Color: Champagne    Moderate #2  Moderate #2    Large open domes Large open domes     The following items were demonstrated to the patient:  Insertion/removal of hearing aid into ear  Adjustment of volume  Changing programs  Function and operation of controls  Telephone use  Cleaning of hearing aid/earmold  Storage of hearing aids  Remote control operation    The patient was informed of or received the following:  Adjustment period and realistic expectations  Cleaning tools  Hearing aid instruction book   Carrying case  Purchase agreement  Repair/loss coverage  Trial period/return policy  Service period  Medical clearance    Real Ear Measurements (REM) were taken today and aids were found to be meeting NAL NL2 targets for soft and average speech inputs. Aids do not exceed MPO. Aids were paired in iphone and in the [employer] candice. Practiced changing dome and wax filters. Dispensing agreement signed. Charges billed to patient's insurance. Patient expressed understanding to all discussed topics.   Follow-up scheduled for December 21st, 2023.      12/08/23  HEMANTH Clifford

## 2023-12-16 ENCOUNTER — HOSPITAL ENCOUNTER (OUTPATIENT)
Age: 58
Discharge: HOME | End: 2023-12-16
Payer: COMMERCIAL

## 2023-12-16 VITALS
RESPIRATION RATE: 20 BRPM | HEART RATE: 105 BPM | DIASTOLIC BLOOD PRESSURE: 97 MMHG | OXYGEN SATURATION: 97 % | TEMPERATURE: 98 F | SYSTOLIC BLOOD PRESSURE: 140 MMHG

## 2023-12-16 DIAGNOSIS — S61.211A LACERATION OF LEFT INDEX FINGER WITHOUT FOREIGN BODY WITHOUT DAMAGE TO NAIL, INITIAL ENCOUNTER: Primary | ICD-10-CM

## 2023-12-16 PROCEDURE — 99213 OFFICE O/P EST LOW 20 MIN: CPT

## 2023-12-16 PROCEDURE — 12002 RPR S/N/AX/GEN/TRNK2.6-7.5CM: CPT

## 2023-12-21 ENCOUNTER — OFFICE VISIT (OUTPATIENT)
Dept: AUDIOLOGY | Facility: CLINIC | Age: 58
End: 2023-12-21
Payer: COMMERCIAL

## 2023-12-21 ENCOUNTER — OFFICE VISIT (OUTPATIENT)
Dept: GASTROENTEROLOGY | Facility: CLINIC | Age: 58
End: 2023-12-21

## 2023-12-21 ENCOUNTER — TELEPHONE (OUTPATIENT)
Dept: GASTROENTEROLOGY | Facility: CLINIC | Age: 58
End: 2023-12-21

## 2023-12-21 VITALS
SYSTOLIC BLOOD PRESSURE: 142 MMHG | WEIGHT: 244.38 LBS | BODY MASS INDEX: 37.04 KG/M2 | DIASTOLIC BLOOD PRESSURE: 92 MMHG | HEIGHT: 68 IN | HEART RATE: 84 BPM

## 2023-12-21 DIAGNOSIS — K21.9 GASTROESOPHAGEAL REFLUX DISEASE, UNSPECIFIED WHETHER ESOPHAGITIS PRESENT: Primary | ICD-10-CM

## 2023-12-21 DIAGNOSIS — K21.9 GASTROESOPHAGEAL REFLUX DISEASE, UNSPECIFIED WHETHER ESOPHAGITIS PRESENT: ICD-10-CM

## 2023-12-21 DIAGNOSIS — Z12.12 ENCOUNTER FOR COLORECTAL CANCER SCREENING: Primary | ICD-10-CM

## 2023-12-21 DIAGNOSIS — Z12.12 SCREENING FOR COLORECTAL CANCER: ICD-10-CM

## 2023-12-21 DIAGNOSIS — Z80.0 FAMILY HISTORY OF ESOPHAGEAL CANCER: ICD-10-CM

## 2023-12-21 DIAGNOSIS — Z80.0 FAMILY HISTORY OF COLON CANCER: ICD-10-CM

## 2023-12-21 DIAGNOSIS — H90.3 SENSORINEURAL HEARING LOSS (SNHL) OF BOTH EARS: Primary | ICD-10-CM

## 2023-12-21 DIAGNOSIS — Z12.11 SCREENING FOR COLORECTAL CANCER: ICD-10-CM

## 2023-12-21 DIAGNOSIS — Z12.11 ENCOUNTER FOR COLORECTAL CANCER SCREENING: Primary | ICD-10-CM

## 2023-12-21 PROCEDURE — 92593 HEARING AID CHECK, BOTH EARS: CPT | Performed by: AUDIOLOGIST

## 2023-12-21 NOTE — PROGRESS NOTES
HEARING AID FOLLOW-UP    Demario Stringer  11/20/1965  [id number]    Patient is here for a routine. This is patients first post-fitting follow up. Hearing Aid Information        Right    Left    Sheila Gongora donna L70-R   [place] [id number]  [id number]   Color: Champagne Color: Champagne    Moderate #2  Moderate #2    Large open domes Large open domes         The patient has the following concerns:   Ears itch when he takes aids out of ears. He needs to turn up the volume on the right aid frequently. In office the following actions were taken:  Increased overall gain in right aid by 2 steps. Tried vented domes, but he felt too much occlusion. He is staying with Large open domes. He mostly uses the Automatic program.   Likes using the bluetooth to connect to phone calls. Overall very positive about the hearing aids. Can hear whispers that he normally could not hear. Patient is to follow up in 1 year or sooner if needed. Reminder card filled out.      12/21/2023  Sreekanth Lo AUD

## 2023-12-21 NOTE — TELEPHONE ENCOUNTER
Scheduled for:  Colonoscopy [id number] , [address]  Provider Name:  Dr. Vianey Sykes  Date: 2/12/2024    Location:[facility]  Sedation:  MAC  Time: 1:30 Pm  (Patient is aware arrival time is at 12:30 Pm)  Prep:  Trilyte or Golytely ,Egd -Npo 3 hrs before prior to procedure  Meds/Allergies Reconciled?:  Physician Reviewed   Diagnosis with codes:  Encounter for colorectal cancer screening,Family history of colon cancer ,  Family history of esophageal cancer,Gastroesophageal reflux disease, unspecified whether esophagitis present  Was patient informed to call insurance with codes (Y/N):  Yes  Referral sent?:  Referral was sent at the time of electronic surgical scheduling. [facility] or [address] notified?:  I sent an electronic request to Endo Scheduling and received a confirmation today. Medication Orders:  Pt is aware to NOT take iron pills, herbal meds and diet supplements for 7 days before exam. Also to NOT take any form of alcohol, recreational drugs and any forms of ED meds 24 hours before exam.   Misc Orders:  None  Further instructions given by staff:  I provide prep instructions to patient at the time of the appointment and reviewed date, time and location, patient verbalized that he understood and is aware to call if he has any questions. Patient was informed about the new cancellation policy for his/her procedure. Patient was also given a copy of the cancellation policy at the time of the appointment and verbalized understanding.

## 2023-12-21 NOTE — PATIENT INSTRUCTIONS
1. Schedule colonoscopy and EGD with MAC    2.  bowel prep from pharmacy (split trilyte or golytely). As we discussed it is important to take the bowel preparation in two parts taking 2L of the liquid the night before the procedure and the second 2L the morning of the procedure starting approximately 6 hours prior to your scheduled procedure time. 3. Continue all medications for procedure    4. Read all bowel prep instructions carefully    5. AVOID seeds, nuts, popcorn, raw fruits and vegetables (cooked is okay) for 2-3 days before procedure    >>>Please note: if you were prescribed a bowel prep and it is too expensive or not covered by insurance, it is okay to substitute Trilyte or Golytely (or any similar generic prep). This can be done by notifying the pharmacy or calling our office.

## 2024-02-12 ENCOUNTER — ANESTHESIA (OUTPATIENT)
Dept: ENDOSCOPY | Facility: HOSPITAL | Age: 59
End: 2024-02-12
Payer: COMMERCIAL

## 2024-02-12 ENCOUNTER — ANESTHESIA EVENT (OUTPATIENT)
Dept: ENDOSCOPY | Facility: HOSPITAL | Age: 59
End: 2024-02-12
Payer: COMMERCIAL

## 2024-02-12 ENCOUNTER — HOSPITAL ENCOUNTER (OUTPATIENT)
Facility: HOSPITAL | Age: 59
Setting detail: HOSPITAL OUTPATIENT SURGERY
Discharge: HOME | End: 2024-02-12
Attending: INTERNAL MEDICINE | Admitting: INTERNAL MEDICINE
Payer: COMMERCIAL

## 2024-02-12 VITALS
BODY MASS INDEX: 37.89 KG/M2 | WEIGHT: 250 LBS | HEIGHT: 68 IN | HEART RATE: 60 BPM | DIASTOLIC BLOOD PRESSURE: 98 MMHG | OXYGEN SATURATION: 95 % | SYSTOLIC BLOOD PRESSURE: 128 MMHG | RESPIRATION RATE: 13 BRPM

## 2024-02-12 DIAGNOSIS — Z80.0 FAMILY HISTORY OF ESOPHAGEAL CANCER: ICD-10-CM

## 2024-02-12 DIAGNOSIS — K63.5 POLYP OF COLON, UNSPECIFIED PART OF COLON, UNSPECIFIED TYPE: ICD-10-CM

## 2024-02-12 DIAGNOSIS — K57.90 DIVERTICULOSIS: ICD-10-CM

## 2024-02-12 DIAGNOSIS — K64.9 HEMORRHOIDS, UNSPECIFIED HEMORRHOID TYPE: ICD-10-CM

## 2024-02-12 DIAGNOSIS — K31.9 GASTRIC ERYTHEMA: Primary | ICD-10-CM

## 2024-02-12 DIAGNOSIS — K21.9 GASTROESOPHAGEAL REFLUX DISEASE, UNSPECIFIED WHETHER ESOPHAGITIS PRESENT: ICD-10-CM

## 2024-02-12 DIAGNOSIS — Z12.12 SCREENING FOR COLORECTAL CANCER: ICD-10-CM

## 2024-02-12 DIAGNOSIS — Z12.11 SCREENING FOR COLORECTAL CANCER: ICD-10-CM

## 2024-02-12 PROBLEM — R13.10 DYSPHAGIA: Status: ACTIVE | Noted: 2024-02-12

## 2024-02-12 PROCEDURE — 45385 COLONOSCOPY W/LESION REMOVAL: CPT | Performed by: INTERNAL MEDICINE

## 2024-02-12 PROCEDURE — 0DB78ZX EXCISION OF STOMACH, PYLORUS, VIA NATURAL OR ARTIFICIAL OPENING ENDOSCOPIC, DIAGNOSTIC: ICD-10-PCS | Performed by: INTERNAL MEDICINE

## 2024-02-12 PROCEDURE — 0DB28ZX EXCISION OF MIDDLE ESOPHAGUS, VIA NATURAL OR ARTIFICIAL OPENING ENDOSCOPIC, DIAGNOSTIC: ICD-10-PCS | Performed by: INTERNAL MEDICINE

## 2024-02-12 PROCEDURE — 0DB18ZX EXCISION OF UPPER ESOPHAGUS, VIA NATURAL OR ARTIFICIAL OPENING ENDOSCOPIC, DIAGNOSTIC: ICD-10-PCS | Performed by: INTERNAL MEDICINE

## 2024-02-12 PROCEDURE — 0DB38ZX EXCISION OF LOWER ESOPHAGUS, VIA NATURAL OR ARTIFICIAL OPENING ENDOSCOPIC, DIAGNOSTIC: ICD-10-PCS | Performed by: INTERNAL MEDICINE

## 2024-02-12 PROCEDURE — 0DBK8ZX EXCISION OF ASCENDING COLON, VIA NATURAL OR ARTIFICIAL OPENING ENDOSCOPIC, DIAGNOSTIC: ICD-10-PCS | Performed by: INTERNAL MEDICINE

## 2024-02-12 PROCEDURE — 43239 EGD BIOPSY SINGLE/MULTIPLE: CPT | Performed by: INTERNAL MEDICINE

## 2024-02-12 RX ORDER — SODIUM CHLORIDE, SODIUM LACTATE, POTASSIUM CHLORIDE, CALCIUM CHLORIDE 600; 310; 30; 20 MG/100ML; MG/100ML; MG/100ML; MG/100ML
INJECTION, SOLUTION INTRAVENOUS CONTINUOUS
OUTPATIENT
Start: 2024-02-12

## 2024-02-12 RX ORDER — NALOXONE HYDROCHLORIDE 0.4 MG/ML
0.08 INJECTION, SOLUTION INTRAMUSCULAR; INTRAVENOUS; SUBCUTANEOUS ONCE AS NEEDED
OUTPATIENT
Start: 2024-02-12 | End: 2024-02-12

## 2024-02-12 RX ORDER — LIDOCAINE HYDROCHLORIDE 10 MG/ML
INJECTION, SOLUTION EPIDURAL; INFILTRATION; INTRACAUDAL; PERINEURAL AS NEEDED
Status: DISCONTINUED | OUTPATIENT
Start: 2024-02-12 | End: 2024-02-12 | Stop reason: SURG

## 2024-02-12 RX ORDER — ONDANSETRON 2 MG/ML
4 INJECTION INTRAMUSCULAR; INTRAVENOUS ONCE AS NEEDED
OUTPATIENT
Start: 2024-02-12 | End: 2024-02-12

## 2024-02-12 RX ORDER — SODIUM CHLORIDE, SODIUM LACTATE, POTASSIUM CHLORIDE, CALCIUM CHLORIDE 600; 310; 30; 20 MG/100ML; MG/100ML; MG/100ML; MG/100ML
INJECTION, SOLUTION INTRAVENOUS CONTINUOUS
Status: DISCONTINUED | OUTPATIENT
Start: 2024-02-12 | End: 2024-02-12

## 2024-02-12 RX ADMIN — SODIUM CHLORIDE, SODIUM LACTATE, POTASSIUM CHLORIDE, CALCIUM CHLORIDE: 600; 310; 30; 20 INJECTION, SOLUTION INTRAVENOUS at 15:43:00

## 2024-02-12 RX ADMIN — LIDOCAINE HYDROCHLORIDE 50 MG: 10 INJECTION, SOLUTION EPIDURAL; INFILTRATION; INTRACAUDAL; PERINEURAL at 15:10:00

## 2024-02-12 NOTE — DISCHARGE INSTRUCTIONS
Home Care Instructions for Colonoscopy and Gastroscopy with Sedation    Diet:  - Resume your regular diet as tolerated unless otherwise instructed.  - Start with light meals to minimize bloating.  - Do not drink alcohol today.    Medication:  - If you have questions about resuming your normal medications, please contact your Primary Care Physician.    Activities:  - Take it easy today. Do not return to work today.  - Do not drive today.  - Do not operate any machinery today (including kitchen equipment).    Colonoscopy:  - You may notice some rectal \"spotting\" (a little blood on the toilet tissue) for a day or two after the exam. This is normal.  - If you experience any rectal bleeding (not spotting), persistent tenderness or sharp severe abdominal pains, oral temperature over 100 degrees Fahrenheit, light-headedness or dizziness, or any other problems, contact your doctor.    Gastroscopy:  - You may have a sore throat for 2-3 days following the exam. This is normal. Gargling with warm salt water (1/2 tsp salt to 1 glass warm water) or using throat lozenges will help.  - If you experience any sharp pain in your neck, abdomen or chest, vomiting of blood, oral temperature over 100 degrees Fahrenheit, light-headedness or dizziness, or any other problems, contact your doctor.    **If unable to reach your doctor, please go to the [facility] Emergency Room**    - Your referring physician will receive a full report of your examination.  - If you do not hear from your doctor's office within two weeks of your biopsy, please call them for your results.    You may be able to see your laboratory results in Fan[employer] between 4 and 7 business days.  In some cases, your physician may not have viewed the results before they are released to Fan[employer].  If you have questions regarding your results contact the physician who ordered the test/exam by phone or via Fanzo by choosing \"Ask a Medical Question.\"

## 2024-02-12 NOTE — ANESTHESIA PREPROCEDURE EVALUATION
Anesthesia PreOp Note    58 year old M PMHxx HTN, PORSCHE, GERD; presenting for EGD+colonoscopy.    HPI:     Andre Howell is a 58 year old male who presents for preoperative consultation requested by: Yuri Canas MD    Date of Surgery: 2/12/2024    Procedure(s):  COLONOSCOPY-SCREENING  ESOPHAGOGASTRODUODENOSCOPY (EGD)  Indication: Gastroesophageal reflux disease, unspecified whether esophagitis present /  Screening for colorectal cancer /Family history of esophageal cancer    Relevant Problems   No relevant active problems       NPO:  Last Liquid Consumption Date: 02/12/24  Last Liquid Consumption Time: 0900  Last Solid Consumption Date: 02/11/24  Last Solid Consumption Time: 1000  Last Liquid Consumption Date: 02/12/24          History Review:  Patient Active Problem List    Diagnosis Date Noted    Postlaminectomy syndrome, lumbar 02/27/2022    Hyperreflexia 02/27/2022    Weakness 02/27/2022    Numbness and tingling 02/27/2022    Insomnia 02/27/2022    Heartburn 02/27/2022    Disorder of rotator cuff syndrome of right shoulder and allied disorder 02/25/2021    Labral tear of shoulder, degenerative, right 02/25/2021    Preoperative testing 12/06/2019    Herniated intervertebral disc of lumbar spine 12/06/2019    Essential hypertension 12/06/2019    Right lumbar radiculopathy 11/02/2019    Chronic right shoulder pain 06/20/2019    Primary osteoarthritis of right shoulder 06/20/2019    Incomplete tear of left rotator cuff 11/08/2018    Internal impingement of left shoulder 11/08/2018    Rotator cuff tendonitis, left 04/12/2018    Chronic left shoulder pain 03/15/2018    Biceps tendonitis on left 03/15/2018    Disorder of rotator cuff syndrome of left shoulder and allied disorder 03/15/2018    Left cervical radiculopathy 06/19/2017    Left carpal tunnel syndrome 06/19/2017    SX/GLOBAL/  /ISABELL/ACDF C4-5-6 SPINECRAFT PLATE, CAGES, ALLOGRAFT//JAMAL TO ASSIST / DOS 04/19/17 / EXP 07/18/17 05/01/2017     Cervical spinal stenosis 04/19/2017    Difficulty urinating 04/07/2017    Herniated cervical disc 03/06/2017    Cervical radiculopathy 02/13/2017    Cervical radiculitis 02/01/2017    Acute pain of left shoulder 02/01/2017    Screen for colon cancer 08/09/2016    Painful orthopaedic hardware (HCC) 09/23/2014    SX 1/27/15 - GLOBAL EXP 4/27/15 - CPT#64346 REMOVAL L3-4 SPINECRAFT INSTRUMENTATION, EXPLORATION OF FUSION, POSS REPEAT FUSION & INTRUMENTATION / JAMAL TO ASSIST HIWOT 12/20/2013    Low back pain 12/09/2013    Sciatica 12/09/2013       Past Medical History:   Diagnosis Date    Back pain     Back problem     Calculus of kidney     Cholelithiasis     Esophageal reflux     Hearing impairment     High blood pressure     High cholesterol     Neuropathy 06/2019    Right foot drop, and right leg weakness    Problems with swallowing     R/T fusion, liquids and solids occasionally    Screen for colon cancer 09/2016    Repeat in 9/2021 due to family hx; no adenomatous polyps in 2016    Sleep apnea     Unspecified essential hypertension     Visual impairment        Past Surgical History:   Procedure Laterality Date    BACK SURGERY  2016    hardware removed    BACK SURGERY  2015    Shabbir put in L3 L4    BACK SURGERY  04/2017    C4-5-6 fusion    CHOLECYSTECTOMY      COLONOSCOPY  2017    ELECTROCARDIOGRAM, COMPLETE  07/19/2012    scanned to media tab, 07/19/2012    INJ PARAVERT F JNT L/S 1 LEV Right 09/15/2014    Procedure: FACET INJECTION UNDER FLUOROSCOPY;  Surgeon: Gustavo Gilliam DO;  Location: [facility]    M-SEDAJ BY SM PHYS PERFRMG SVC 5+ YR Right 09/15/2014    Procedure: FACET INJECTION UNDER FLUOROSCOPY;  Surgeon: Gustavo Gilliam DO;  Location: [facility]    SURGERY - EXTERNAL Left 12/07/2018    Rotator cuff surgery Left by Dr. [facility]       Medications Prior to Admission   Medication Sig Dispense Refill Last Dose    valsartan-hydroCHLOROthiazide 160-12.5 MG Oral  Tab Take 1 tablet by mouth daily. 90 tablet 3 2024    amLODIPine 5 MG Oral Tab Take 1 tablet (5 mg total) by mouth daily. 90 tablet 3 2024    AZELASTINE  MCG/SPRAY Nasal Solution SPRAY 2 SPRAYS BY NASAL ROUTE DAILY 30 mL 2     fluticasone propionate 50 MCG/ACT Nasal Suspension        Multiple Vitamins-Minerals (MULTIVITAMIN ADULT OR) Take by mouth.   2024    [] polyethylene glycol, PEG 3350-KCl-NaBcb-NaCl-NaSulf, 236 g Oral Recon Soln Take 4,000 mL by mouth once for 1 dose. 4000 mL 0     [] tobramycin-dexamethasone 0.3-0.1 % Ophthalmic Suspension Apply 2 drops to eye every 2 (two) hours while awake for 5 days. 1 each 0     MONTELUKAST 10 MG Oral Tab TAKE 1 TABLET BY MOUTH EVERY DAY AT NIGHT 30 tablet 0      Current Facility-Administered Medications Ordered in Epic   Medication Dose Route Frequency Provider Last Rate Last Admin    lactated ringers infusion   Intravenous Continuous Yuri Canas MD 20 mL/hr at 24 1248 New Bag at 24 1248     No current [facility]-ordered outpatient medications on file.       Allergies   Allergen Reactions    Robaxin [Methocarbamol] RASH, FEVER and UNKNOWN    Seasonal TINITUS       Family History   Problem Relation Age of Onset    Hypertension Mother     Thyroid disease Mother     Cancer Mother         kidney    Colon Polyps Mother     Hypertension Father     Heart Disease Father     Hypertension Brother     Cancer Maternal Grandfather         colon    Colon Cancer Maternal Grandfather     Depression Other      Social History     Socioeconomic History    Marital status:    Tobacco Use    Smoking status: Never    Smokeless tobacco: Never   Vaping Use    Vaping Use: Never used   Substance and Sexual Activity    Alcohol use: Yes     Alcohol/week: 0.0 standard drinks of alcohol     Comment: occasionally    Drug use: Not Currently     Frequency: 7.0 times per week     Types: Cannabis     Comment: \"medical marijuanna\"   Other Topics  Concern    Caffeine Concern Yes     Comment: soda    Grew up on a farm No    History of tanning Yes    Outdoor occupation No    Reaction to local anesthetic No    Pt has a pacemaker No    Pt has a defibrillator No       Available pre-op labs reviewed.             Vital Signs:  Body mass index is 38.01 kg/m².   height is 1.727 m (5' 8\") and weight is 113.4 kg (250 lb). His blood pressure is 137/106 (abnormal) and his pulse is 78. His respiration is 12 and oxygen saturation is 97%.   Vitals:    01/31/24 0942 02/12/24 1240 02/12/24 1250   BP:  (!) 173/91 (!) 137/106   Pulse:  72 78   Resp:  (!) 6 12   SpO2:  97%    Weight: 113.4 kg (250 lb)     Height: 1.727 m (5' 8\")          Anesthesia Evaluation     Patient summary reviewed and Nursing notes reviewed    No history of anesthetic complications   Airway   Mallampati: II  TM distance: >3 FB  Neck ROM: full  Dental      Comment: Risks of oral and dental damage including but not limited to cut/bloody lips or gums, damage to or dislodgment of native teeth or prosthetics/implants discussed preoperatively, with patient expressing understanding and desire to proceed with anesthetic. Patient denies knowledge of any additional damage/disease/weakness of teeth not otherwise documented in pre-anesthetic evaluation.        Pulmonary - normal exam   (+) sleep apnea on CPAP  (-) COPD, asthma, recent URI  Cardiovascular - normal exam  Exercise tolerance: good  (+) hypertension    Neuro/Psych    (+)  neuromuscular disease (sciatica, multiple radiculopathies),      (-) seizures, TIA, CVA    GI/Hepatic/Renal    (+) GERD  (-) liver disease, renal disease    Endo/Other - negative ROS   (-) diabetes mellitus, hypothyroidism, hyperthyroidism  Abdominal  - normal exam                 Anesthesia Plan:   ASA:  3  Plan:   MAC and general  Post-op Pain Management: Oral pain medication  Informed Consent Plan and Risks Discussed With:  Patient, father and mother  Discussed plan with:  CRNA and  surgeon      I have informed Andre Howell and/or legal guardian or family member of the nature of the anesthetic plan, benefits, risks including possible dental damage if relevant, major complications, and any alternative forms of anesthetic management.   All of the patient's questions were answered to the best of my ability. The patient desires the anesthetic management as planned.  Drew Seth MD  2/12/2024 2:39 PM  Present on Admission:  **None**

## 2024-02-12 NOTE — ANESTHESIA POSTPROCEDURE EVALUATION
Patient: Andre Howell    Procedure Summary       Date: 02/12/24 Room / Location: [facility] ENDOSCOPY 01 / [facility] ENDOSCOPY    Anesthesia Start: 1508 Anesthesia Stop:     Procedures:       COLONOSCOPY-SCREENING      ESOPHAGOGASTRODUODENOSCOPY (EGD) Diagnosis:       Gastroesophageal reflux disease, unspecified whether esophagitis present      Screening for colorectal cancer      Family history of esophageal cancer      (esophagitis; gastric erythema; colon polyp; hemorrhoids; diverticulosis)    Surgeons: Yuri Canas MD Anesthesiologist:     Anesthesia Type: MAC ASA Status: 2            Anesthesia Type: MAC    Vitals Value Taken Time   /76 02/12/24 1548   Temp  02/12/24 1549   Pulse 59 02/12/24 1548   Resp 22 02/12/24 1548   SpO2 94 % 02/12/24 1548       [facility] AN Post Evaluation:   Patient Evaluated in Patient location: [place] [id number].  Level of Consciousness: awake  Pain Score: 0  Pain Management: adequate  Airway Patency:patent  Dental exam unchanged from preop  Yes    Nausea/Vomiting: none  Cardiovascular Status: stable  Respiratory Status: room air  Postoperative Hydration stable      Latesha Yi CRNA  2/12/2024 3:49 PM

## 2024-02-12 NOTE — OPERATIVE REPORT
Esophagogastroduodenoscopy (EGD) & Colonoscopy Report    Andre Howell     1965 Age 58 year old   PCP Aubrey Sheehan MD Endoscopist Yuri Canas MD     Date of procedure: 24    Procedure: EGD w/ biopsy & Colonoscopy w/ snare polypectomy    Pre-operative diagnosis: colorectal cancer screening, family history of colon cancer, history of colon polyp, GERD, dysphagia     Post-operative diagnosis: see impression    Sedation: monitored anesthesia care (MAC)    Consent: We discussed the risks/benefits and alternatives to this procedure, as well as the planned sedation. Informed consent was obtained from the patient after the risks of the procedure were discussed, including but not limited to bleeding, perforation, aspiration, infection, or possibility of a missed lesion as well as the risks of anesthesia including but not limited to cardiopulmonary complications. The patient signed informed consent and elected to proceed with EGD/Colonoscopy with intervention [i.e. Biopsy, control of bleeding, dilatation, polypectomy, endoscopic mucosal resection, etc.] as indicated.    EGD procedure: The patient was placed in the left lateral decubitus position and begun on continuous blood pressure pulse oximetry and EKG monitoring and this was maintained throughout the procedure. Once an adequate level of sedation was obtained a bite block was placed. Then the lubricated tip of the Jittuim-EMI-703 diagnostic video upper endoscope was carefully inserted and advanced using direct visualization into the posterior pharynx and ultimately into the esophagus. Air was then withdrawn and the endoscope was removed.    Colonoscopy procedure: Once an adequate level of sedation was obtained a digital rectal exam was completed, with normal tone and no masses palpated. Then the lubricated tip of the [id number]-[id number] diagnostic video colonoscope was carefully inserted and advanced without difficulty to the cecum using the air  insufflation technique (only Co2 was used for insufflation). The cecum was identified by localizing the trifold, the appendix and the ileocecal valve. Withdrawal was begun with thorough washing and careful examination of the colonic walls and folds. The ascending colon was viewed twice in the forward view. Photodocumentation was obtained. The bowel prep was good. Views of the colon were good with washing. Withdrawal time was 10 minutes.    Air was then withdrawn and the endoscope was removed. The patient tolerated the procedure well. There were no immediate postoperative complications. The patient’s vital signs were monitored throughout the procedure and remained stable.    Estimated blood loss: insignificant    Specimens collected:  colon polyp, gastric and esophageal biopsies    Complications: none    EGD findings:      1. Esophagus: The squamocolumnar junction was noted and appeared regular. There was mild distal esophagitis. Multiple cold forceps biopsies were obtained in the distal esophagus and then randomly in the mid/proximal esophagus. The esophageal mucosa appeared unremarkable.  2. Stomach: The stomach distended normally. Normal rugal folds were seen. The pylorus was patent. The gastric mucosa appeared mildly erythematous in the distal half. Multiple cold forceps biopsies were obtained. The rest of the gastric mucosa appeared unremarkable otherwise. Retroflexion revealed a normal fundus and cardia.   3. Duodenum: The duodenal mucosa appeared normal in the 1st and 2nd portion of the duodenum.     Colonoscopy findings:    Cecum: normal mucosa and vascular pattern    Ascending colon: there was a 6-7 mm polyp in the proximal ascending colon removed by cold snare polypectomy. Otherwise, normal mucosa and vascular pattern    Transverse colon: there was an inverted diverticulum. Otherwise, normal mucosa and vascular pattern    Descending colon: normal mucosa and vascular pattern    Sigmoid colon: there was mild  diverticulosis. Otherwise, normal mucosa and vascular pattern    Rectum: retroflexed view showed small non-bleeding hemorrhoids. Otherwise, normal mucosa and vascular pattern.    Impression:  1. Mild distal esophagitis  2. Mild non-specific distal gastric erythema  3. A small colon polyp removed  4. Colon diverticulosis  5. Small hemorrhoids  6. Otherwise, unremarkable colonoscopy and upper endoscopy    Recommend:  1. Await pathology.   2. Repeat colonoscopy interval pending final pathology results. If new signs or symptoms develop, procedure may need to be repeated sooner.   3. Continue your current medications  4. Increase fiber in the diet  5. Follow up with your primary care physician on a routine basis    >>>If biopsies were performed and you have not received your pathology results either by phone or letter within 2 weeks, please call our office at [phone].    MD [facility] - Gastroenterology  2/12/2024

## 2024-02-12 NOTE — H&P
History & Physical Examination    Patient Name: Andre Howell  MRN: [id number]  [facility]: [id number]  YOB: 1965    Diagnosis: colorectal cancer screening, family history of colon cancer, history of colon polyp, GERD, dysphagia    Medications Prior to Admission   Medication Sig Dispense Refill Last Dose    valsartan-hydroCHLOROthiazide 160-12.5 MG Oral Tab Take 1 tablet by mouth daily. 90 tablet 3 2024    amLODIPine 5 MG Oral Tab Take 1 tablet (5 mg total) by mouth daily. 90 tablet 3 2024    AZELASTINE  MCG/SPRAY Nasal Solution SPRAY 2 SPRAYS BY NASAL ROUTE DAILY 30 mL 2     fluticasone propionate 50 MCG/ACT Nasal Suspension        Multiple Vitamins-Minerals (MULTIVITAMIN ADULT OR) Take by mouth.   2024    [] polyethylene glycol, PEG 3350-KCl-NaBcb-NaCl-NaSulf, 236 g Oral Recon Soln Take 4,000 mL by mouth once for 1 dose. 4000 mL 0     [] tobramycin-dexamethasone 0.3-0.1 % Ophthalmic Suspension Apply 2 drops to eye every 2 (two) hours while awake for 5 days. 1 each 0     MONTELUKAST 10 MG Oral Tab TAKE 1 TABLET BY MOUTH EVERY DAY AT NIGHT 30 tablet 0      Current Facility-Administered Medications   Medication Dose Route Frequency    lactated ringers infusion   Intravenous Continuous       Allergies:   Allergies   Allergen Reactions    Robaxin [Methocarbamol] RASH, FEVER and UNKNOWN    Seasonal TINITUS       Past Medical History:   Diagnosis Date    Back pain     Back problem     Calculus of kidney     Cholelithiasis     Esophageal reflux     Hearing impairment     High blood pressure     High cholesterol     Neuropathy 2019    Right foot drop, and right leg weakness    Problems with swallowing     R/T fusion, liquids and solids occasionally    Screen for colon cancer 2016    Repeat in 2021 due to family hx; no adenomatous polyps in 2016    Sleep apnea     Unspecified essential hypertension     Visual impairment      Past Surgical History:   Procedure Laterality  Date    BACK SURGERY  2016    hardware removed    BACK SURGERY  2015    Shabbir put in L3 L4    BACK SURGERY  04/2017    C4-5-6 fusion    CHOLECYSTECTOMY      COLONOSCOPY  2017    ELECTROCARDIOGRAM, COMPLETE  07/19/2012    scanned to media tab, 07/19/2012    INJ PARAVERT F JNT L/S 1 LEV Right 09/15/2014    Procedure: FACET INJECTION UNDER FLUOROSCOPY;  Surgeon: Gustavo Gilliam DO;  Location: [facility]    M-SEDAJ BY  PHYS PERFRMG SVC 5+ YR Right 09/15/2014    Procedure: FACET INJECTION UNDER FLUOROSCOPY;  Surgeon: Gustavo Gilliam DO;  Location: [facility]    SURGERY - EXTERNAL Left 12/07/2018    Rotator cuff surgery Left by [facility]     Family History   Problem Relation Age of Onset    Hypertension Mother     Thyroid disease Mother     Cancer Mother         kidney    Colon Polyps Mother     Hypertension Father     Heart Disease Father     Hypertension Brother     Cancer Maternal Grandfather         colon    Colon Cancer Maternal Grandfather     Depression Other      Social History     Tobacco Use    Smoking status: Never    Smokeless tobacco: Never   Substance Use Topics    Alcohol use: Yes     Alcohol/week: 0.0 standard drinks of alcohol     Comment: occasionally       SYSTEM Check if Physical Exam is Normal If not normal, please explain:   HEENT [ X]    NECK  [ X]    HEART [ X]    LUNGS [ X]    ABDOMEN [ X]    EXTREMITIES [ X]      I have discussed the risks and benefits and alternatives of the procedure with the patient/family.  They understood and agreed to proceed with plan of care.   I have reviewed the History and Physical done within the last 30 days.  Any changes noted above.  Yuri Canas MD  [facility] - Gastroenterology  2/12/2024  2:57 PM          ;r

## 2024-02-13 ENCOUNTER — TELEPHONE (OUTPATIENT)
Facility: CLINIC | Age: 59
End: 2024-02-13

## 2024-02-13 NOTE — PROGRESS NOTES
GI staff: please place recall for colonoscopy in 5 years     Thanks    Yuri Canas MD  [facility] - Gastroenterology  2/13/2024  3:44 PM

## 2024-02-14 NOTE — TELEPHONE ENCOUNTER
Health maintenance updated. Last colonoscopy done 2/12/24. 5 year recall placed into Pt Outreach, next due on 02/2029 per Dr. Canas.

## 2024-02-14 NOTE — TELEPHONE ENCOUNTER
----- Message from Yuri Canas MD sent at 2/13/2024  3:46 PM CST -----  GI staff: please place recall for colonoscopy in 5 years     Thanks    Yuri Canas MD  [facility] - Gastroenterology  2/13/2024  3:44 PM

## 2024-05-01 ENCOUNTER — HOSPITAL ENCOUNTER (OUTPATIENT)
Dept: GENERAL RADIOLOGY | Age: 59
Discharge: HOME | End: 2024-05-01
Attending: FAMILY MEDICINE
Payer: COMMERCIAL

## 2024-05-01 ENCOUNTER — OFFICE VISIT (OUTPATIENT)
Dept: FAMILY MEDICINE CLINIC | Facility: CLINIC | Age: 59
End: 2024-05-01
Payer: COMMERCIAL

## 2024-05-01 VITALS
DIASTOLIC BLOOD PRESSURE: 78 MMHG | BODY MASS INDEX: 36.37 KG/M2 | HEIGHT: 68 IN | WEIGHT: 240 LBS | SYSTOLIC BLOOD PRESSURE: 132 MMHG | TEMPERATURE: 97 F | RESPIRATION RATE: 20 BRPM | HEART RATE: 98 BPM

## 2024-05-01 DIAGNOSIS — Z98.890 HISTORY OF SHOULDER SURGERY: ICD-10-CM

## 2024-05-01 DIAGNOSIS — M25.511 ACUTE PAIN OF RIGHT SHOULDER: Primary | ICD-10-CM

## 2024-05-01 DIAGNOSIS — M25.511 ACUTE PAIN OF RIGHT SHOULDER: ICD-10-CM

## 2024-05-01 PROCEDURE — 3008F BODY MASS INDEX DOCD: CPT | Performed by: FAMILY MEDICINE

## 2024-05-01 PROCEDURE — 3078F DIAST BP <80 MM HG: CPT | Performed by: FAMILY MEDICINE

## 2024-05-01 PROCEDURE — 99213 OFFICE O/P EST LOW 20 MIN: CPT | Performed by: FAMILY MEDICINE

## 2024-05-01 PROCEDURE — 73030 X-RAY EXAM OF SHOULDER: CPT | Performed by: FAMILY MEDICINE

## 2024-05-01 PROCEDURE — 3075F SYST BP GE 130 - 139MM HG: CPT | Performed by: FAMILY MEDICINE

## 2024-05-01 RX ORDER — ACETAMINOPHEN AND CODEINE PHOSPHATE 300; 30 MG/1; MG/1
1 TABLET ORAL EVERY 6 HOURS PRN
Qty: 30 TABLET | Refills: 0 | Status: SHIPPED | OUTPATIENT
Start: 2024-05-01

## 2024-05-01 NOTE — PROGRESS NOTES
Subjective:   Patient ID: Andre Howell is a 58 year old male.    Pt presents with popping and discomfort of right shoulder over the last week. Pt injured right shoulder when trying to push off ground and felt a pop.   No bruising but feels swelling. Pain over the anterior and top of shoulder. Pt states pain about 5/10.   Past hx of shoulder issues and had a procedure with Dr Riley 3 years ago.         History/Other:   Review of Systems   Musculoskeletal:  Positive for arthralgias.     Current Outpatient Medications   Medication Sig Dispense Refill    acetaminophen-codeine 300-30 MG Oral Tab Take 1 tablet by mouth every 6 (six) hours as needed for Pain. Medication may causes sedation, constipation. Not to operate heavy machinery or drive on medication. 30 tablet 0    valsartan-hydroCHLOROthiazide 160-12.5 MG Oral Tab Take 1 tablet by mouth daily. 90 tablet 3    amLODIPine 5 MG Oral Tab Take 1 tablet (5 mg total) by mouth daily. 90 tablet 3    MONTELUKAST 10 MG Oral Tab TAKE 1 TABLET BY MOUTH EVERY DAY AT NIGHT 30 tablet 0    AZELASTINE  MCG/SPRAY Nasal Solution SPRAY 2 SPRAYS BY NASAL ROUTE DAILY 30 mL 2    fluticasone propionate 50 MCG/ACT Nasal Suspension       Multiple Vitamins-Minerals (MULTIVITAMIN ADULT OR) Take by mouth.       Allergies:  Allergies   Allergen Reactions    Robaxin [Methocarbamol] RASH, FEVER and UNKNOWN    Seasonal TINITUS       Objective:   Physical Exam  Constitutional:       Appearance: Normal appearance.   Musculoskeletal:      Right shoulder: Tenderness present. No swelling. Decreased range of motion.      Comments: Some clicking with movement. Pain with movement. Pt states feels weaker.  Neurovascular intact/ normal.      Neurological:      Mental Status: He is alert.         Assessment & Plan:   1. Acute pain of right shoulder / ? Dislocation/ tear:   2. History of shoulder surgery:  - After discussion with patient, will check xray of the right shoulder. Medications as  needed for symptoms. Follow up and further management after testing. To follow up with orthopedics pending xrays. Tylenol #3 as needed for pains.          No orders of the defined types were placed in this encounter.      Meds This Visit:  Requested Prescriptions     Signed Prescriptions Disp Refills    acetaminophen-codeine 300-30 MG Oral Tab 30 tablet 0     Sig: Take 1 tablet by mouth every 6 (six) hours as needed for Pain. Medication may causes sedation, constipation. Not to operate heavy machinery or drive on medication.       Imaging & Referrals:  ORTHOPEDIC - INTERNAL  XR SHOULDER, COMPLETE (MIN 2 VIEWS), RIGHT (CPT=73030)

## 2024-06-20 ENCOUNTER — HOSPITAL ENCOUNTER (OUTPATIENT)
Dept: MRI IMAGING | Age: 59
Discharge: HOME | End: 2024-06-20
Attending: ORTHOPAEDIC SURGERY

## 2024-06-20 ENCOUNTER — PATIENT MESSAGE (OUTPATIENT)
Dept: FAMILY MEDICINE CLINIC | Facility: CLINIC | Age: 59
End: 2024-06-20

## 2024-06-20 DIAGNOSIS — G89.29 CHRONIC RIGHT SHOULDER PAIN: ICD-10-CM

## 2024-06-20 DIAGNOSIS — M25.511 RIGHT SHOULDER PAIN, UNSPECIFIED CHRONICITY: Primary | ICD-10-CM

## 2024-06-20 DIAGNOSIS — M25.511 CHRONIC RIGHT SHOULDER PAIN: ICD-10-CM

## 2024-06-20 DIAGNOSIS — Z98.890 HISTORY OF SHOULDER SURGERY: ICD-10-CM

## 2024-06-20 DIAGNOSIS — S43.421A SPRAIN OF RIGHT ROTATOR CUFF CAPSULE: ICD-10-CM

## 2024-06-20 PROCEDURE — 73221 MRI JOINT UPR EXTREM W/O DYE: CPT | Performed by: ORTHOPAEDIC SURGERY

## 2024-06-20 NOTE — TELEPHONE ENCOUNTER
Referral request noted. Referral approved, signed and sent to [facility].  Pt notified through [employer]

## 2024-06-20 NOTE — TELEPHONE ENCOUNTER
From: Andre Howell  To: Aubrey Sheehan  Sent: 6/20/2024 11:58 AM CDT  Subject: Shoulder MRI    I was finally able to get my shoulder MRI done today. Dr. Riley ordered the MRI. My insurance is telling me I need a referral from you to see her and go over the results with her. I am on an HMO

## 2024-08-01 ENCOUNTER — PATIENT MESSAGE (OUTPATIENT)
Dept: FAMILY MEDICINE CLINIC | Facility: CLINIC | Age: 59
End: 2024-08-01

## 2024-08-01 ENCOUNTER — HOSPITAL ENCOUNTER (EMERGENCY)
Facility: HOSPITAL | Age: 59
Discharge: HOME | End: 2024-08-01
Attending: EMERGENCY MEDICINE
Payer: COMMERCIAL

## 2024-08-01 ENCOUNTER — APPOINTMENT (OUTPATIENT)
Dept: GENERAL RADIOLOGY | Facility: HOSPITAL | Age: 59
End: 2024-08-01
Attending: EMERGENCY MEDICINE
Payer: COMMERCIAL

## 2024-08-01 VITALS
HEIGHT: 68 IN | TEMPERATURE: 98 F | OXYGEN SATURATION: 92 % | BODY MASS INDEX: 36.37 KG/M2 | RESPIRATION RATE: 19 BRPM | DIASTOLIC BLOOD PRESSURE: 103 MMHG | SYSTOLIC BLOOD PRESSURE: 166 MMHG | WEIGHT: 240 LBS | HEART RATE: 91 BPM

## 2024-08-01 DIAGNOSIS — I10 HYPERTENSION, UNSPECIFIED TYPE: ICD-10-CM

## 2024-08-01 DIAGNOSIS — R51.9 ACUTE NONINTRACTABLE HEADACHE, UNSPECIFIED HEADACHE TYPE: Primary | ICD-10-CM

## 2024-08-01 LAB
ALBUMIN SERPL-MCNC: 4.9 G/DL (ref 3.2–4.8)
ALP LIVER SERPL-CCNC: 35 U/L
ALT SERPL-CCNC: 32 U/L
ANION GAP SERPL CALC-SCNC: 9 MMOL/L (ref 0–18)
AST SERPL-CCNC: 26 U/L (ref ?–34)
ATRIAL RATE: 99 BPM
BASOPHILS # BLD AUTO: 0.03 X10(3) UL (ref 0–0.2)
BASOPHILS NFR BLD AUTO: 0.4 %
BILIRUB DIRECT SERPL-MCNC: 0.3 MG/DL (ref ?–0.3)
BILIRUB SERPL-MCNC: 1.5 MG/DL (ref 0.3–1.2)
BUN BLD-MCNC: 12 MG/DL (ref 9–23)
BUN/CREAT SERPL: 13.8 (ref 10–20)
CALCIUM BLD-MCNC: 9.7 MG/DL (ref 8.7–10.4)
CHLORIDE SERPL-SCNC: 106 MMOL/L (ref 98–112)
CO2 SERPL-SCNC: 27 MMOL/L (ref 21–32)
CREAT BLD-MCNC: 0.87 MG/DL
DEPRECATED RDW RBC AUTO: 45.1 FL (ref 35.1–46.3)
EGFRCR SERPLBLD CKD-EPI 2021: 101 ML/MIN/1.73M2 (ref 60–?)
EOSINOPHIL # BLD AUTO: 0.16 X10(3) UL (ref 0–0.7)
EOSINOPHIL NFR BLD AUTO: 1.9 %
ERYTHROCYTE [DISTWIDTH] IN BLOOD BY AUTOMATED COUNT: 13.1 % (ref 11–15)
GLUCOSE BLD-MCNC: 112 MG/DL (ref 70–99)
HCT VFR BLD AUTO: 45.2 %
HGB BLD-MCNC: 15.2 G/DL
IMM GRANULOCYTES # BLD AUTO: 0.04 X10(3) UL (ref 0–1)
IMM GRANULOCYTES NFR BLD: 0.5 %
LYMPHOCYTES # BLD AUTO: 1.52 X10(3) UL (ref 1–4)
LYMPHOCYTES NFR BLD AUTO: 17.8 %
MCH RBC QN AUTO: 31.5 PG (ref 26–34)
MCHC RBC AUTO-ENTMCNC: 33.6 G/DL (ref 31–37)
MCV RBC AUTO: 93.6 FL
MONOCYTES # BLD AUTO: 0.78 X10(3) UL (ref 0.1–1)
MONOCYTES NFR BLD AUTO: 9.1 %
NEUTROPHILS # BLD AUTO: 6.01 X10 (3) UL (ref 1.5–7.7)
NEUTROPHILS # BLD AUTO: 6.01 X10(3) UL (ref 1.5–7.7)
NEUTROPHILS NFR BLD AUTO: 70.3 %
OSMOLALITY SERPL CALC.SUM OF ELEC: 295 MOSM/KG (ref 275–295)
P AXIS: 46 DEGREES
P-R INTERVAL: 168 MS
PLATELET # BLD AUTO: 305 10(3)UL (ref 150–450)
POTASSIUM SERPL-SCNC: 3.5 MMOL/L (ref 3.5–5.1)
PROT SERPL-MCNC: 7.6 G/DL (ref 5.7–8.2)
Q-T INTERVAL: 334 MS
QRS DURATION: 82 MS
QTC CALCULATION (BEZET): 428 MS
R AXIS: 23 DEGREES
RBC # BLD AUTO: 4.83 X10(6)UL
SODIUM SERPL-SCNC: 142 MMOL/L (ref 136–145)
T AXIS: 16 DEGREES
VENTRICULAR RATE: 99 BPM
WBC # BLD AUTO: 8.5 X10(3) UL (ref 4–11)

## 2024-08-01 PROCEDURE — 93010 ELECTROCARDIOGRAM REPORT: CPT

## 2024-08-01 PROCEDURE — 93005 ELECTROCARDIOGRAM TRACING: CPT

## 2024-08-01 PROCEDURE — 85025 COMPLETE CBC W/AUTO DIFF WBC: CPT | Performed by: EMERGENCY MEDICINE

## 2024-08-01 PROCEDURE — 96375 TX/PRO/DX INJ NEW DRUG ADDON: CPT

## 2024-08-01 PROCEDURE — 99285 EMERGENCY DEPT VISIT HI MDM: CPT

## 2024-08-01 PROCEDURE — 80048 BASIC METABOLIC PNL TOTAL CA: CPT | Performed by: EMERGENCY MEDICINE

## 2024-08-01 PROCEDURE — 96374 THER/PROPH/DIAG INJ IV PUSH: CPT

## 2024-08-01 PROCEDURE — 80076 HEPATIC FUNCTION PANEL: CPT | Performed by: EMERGENCY MEDICINE

## 2024-08-01 PROCEDURE — 96361 HYDRATE IV INFUSION ADD-ON: CPT

## 2024-08-01 PROCEDURE — 71045 X-RAY EXAM CHEST 1 VIEW: CPT | Performed by: EMERGENCY MEDICINE

## 2024-08-01 RX ORDER — KETOROLAC TROMETHAMINE 15 MG/ML
15 INJECTION, SOLUTION INTRAMUSCULAR; INTRAVENOUS ONCE
Status: COMPLETED | OUTPATIENT
Start: 2024-08-01 | End: 2024-08-01

## 2024-08-01 RX ORDER — METOCLOPRAMIDE HYDROCHLORIDE 5 MG/ML
10 INJECTION INTRAMUSCULAR; INTRAVENOUS ONCE
Status: COMPLETED | OUTPATIENT
Start: 2024-08-01 | End: 2024-08-01

## 2024-08-01 NOTE — ED INITIAL ASSESSMENT (HPI)
Patient is here with complain of high BP since yesterday. Patient states having high BP and is taking his meds. Patient states that his last reading was 163/149.  Patient states having headache for a week. Dizziness & shortness of breath x 2 days. Denies blurry vision.  No facial droop, arm drift, equal hand grasps. No ataxia.

## 2024-08-01 NOTE — TELEPHONE ENCOUNTER
From: Andre Howell  To: Aubrey Sheehan  Sent: 8/1/2024 12:11 PM CDT  Subject: Blood pressure    My blood pressure has been running high. Last read 155/108 pulse 92. Can Dr. Sheehan see me or should I go somewhere else? Readings are attached

## 2024-08-01 NOTE — ED PROVIDER NOTES
Patient Seen in: [facility] Emergency Department    History     Chief Complaint   Patient presents with    Headache       HPI    58-year-old male with past medical history significant for chronic back pain, GERD, high blood pressure, high cholesterol, neuropathy, sleep apnea, presents to the ED for evaluation of 1 week of headache, high blood pressure, shortness of breath at rest, some episodes of feeling lightheadedness.  Patient states that his last blood pressure reading at home was 163/149.  He noticed the dizziness and shortness of breath for the last 2 days however has had a headache for the last week.  He denies any vision changes, muscle weakness, difficulty speaking.    History from Independent Source:       External Records Reviewed:     History reviewed.   Past Medical History:    Back pain    Back problem    Calculus of kidney    Cholelithiasis    Colon adenoma    x1    Esophageal reflux    Hearing impairment    High blood pressure    High cholesterol    Neuropathy    Right foot drop, and right leg weakness    Problems with swallowing    R/T fusion, liquids and solids occasionally    Screen for colon cancer    Repeat in 9/2021 due to family hx; no adenomatous polyps in 2016    Sleep apnea    Unspecified essential hypertension    Visual impairment       History reviewed.   Past Surgical History:   Procedure Laterality Date    Back surgery  2016    hardware removed    Back surgery  2015    Shabbir put in L3 L4    Back surgery  04/2017    C4-5-6 fusion    Cholecystectomy      Colonoscopy  2017    Colonoscopy screening - referral N/A 02/12/2024    Procedure: COLONOSCOPY-SCREENING;  Surgeon: Yuri Canas MD;  Location: [facility] ENDOSCOPY    Egd  02/12/2024    Electrocardiogram, complete  07/19/2012    scanned to media tab, 07/19/2012    Inj paravert f jnt l/s 1 lev Right 09/15/2014    Procedure: FACET INJECTION UNDER FLUOROSCOPY;  Surgeon: Gustavo Gilliam DO;  [facility]: [facility]     M-sedaj by long phys perfrmg svc 5+ yr Right 09/15/2014    Procedure: FACET INJECTION UNDER FLUOROSCOPY;  Surgeon: Gustavo Gilliam DO;  Location: [facility]    Surgery - external Left 12/07/2018    Rotator cuff surgery Left by [facility]         Medications :  (Not in a hospital admission)       Family History   Problem Relation Age of Onset    Hypertension Mother     Thyroid disease Mother     Cancer Mother         kidney    Colon Polyps Mother     Hypertension Father     Heart Disease Father     Hypertension Brother     Cancer Maternal Grandfather         colon    Colon Cancer Maternal Grandfather     Depression Other        Smoking Status:   Social History     Socioeconomic History    Marital status:    Tobacco Use    Smoking status: Never    Smokeless tobacco: Never   Vaping Use    Vaping status: Never Used   Substance and Sexual Activity    Alcohol use: Yes     Alcohol/week: 0.0 standard drinks of alcohol     Comment: occasionally    Drug use: Not Currently     Frequency: 7.0 times per week     Types: Cannabis     Comment: \"medical marijuanna\"   Other Topics Concern    Caffeine Concern Yes     Comment: soda    [place] up on a farm No    History of tanning Yes    Outdoor occupation No    Reaction to local anesthetic No    Pt has a pacemaker No    Pt has a defibrillator No       Constitutional and vital signs reviewed.      Social History and Family History elements reviewed from today, pertinent positives to the presenting problem noted.    Physical Exam     ED Triage Vitals   BP 08/01/24 1336 (S) (!) 182/142   Pulse 08/01/24 1336 (!) 129   Resp 08/01/24 1336 20   Temp 08/01/24 1336 97.9 °F (36.6 °C)   Temp src 08/01/24 1336 Temporal   SpO2 08/01/24 1336 98 %   O2 Device 08/01/24 1445 None (Room air)       Physical Exam   Constitutional: AAOx3, well nourished, NAD  HEENT: Normocephalic, PERRLA, MMM  CV: s1s2+, RRR, no m/r/g, normal distal pulses  Pulmonary/Chest: CTA b/l  with no rales, wheezes.  No chest wall tenderness  Abdominal: Nontender.  Nondistended. Soft. Bowel sounds are normal.   Neck/Back:   :   Musculoskeletal: Normal range of motion. No deformity.   Neurological: Awake, alert. Normal reflexes. No cranial nerve deficit.    Skin: Skin is warm and dry. No rash noted. No erythema.   Psychiatric:      All measures to prevent infection transmission during my interaction with the patient were taken. The patient was already wearing a droplet mask on my arrival to the room. Personal protective equipment was worn throughout the duration of the exam.      ED Course        Labs Reviewed   BASIC METABOLIC PANEL (8) - Abnormal; Notable for the following components:       Result Value    Glucose 112 (*)     All other components within normal limits   HEPATIC FUNCTION PANEL (7) - Abnormal; Notable for the following components:    Alkaline Phosphatase 35 (*)     Bilirubin, Total 1.5 (*)     Albumin 4.9 (*)     All other components within normal limits   CBC WITH DIFFERENTIAL WITH PLATELET    Narrative:     The following orders were created for panel order CBC With Differential With Platelet.                  Procedure                               Abnormality         Status                                     ---------                               -----------         ------                                     CBC W/ DIFFERENTIAL[[id number]]                              Final result                                                 Please view results for these tests on the individual orders.   RAINBOW DRAW BLUE   CBC W/ DIFFERENTIAL     My Independent Interpretation of EKG (if performed): EKG    Rate, intervals and axes as noted on EKG Report.  Rate: 99 bpm  Rhythm: Sinus Rhythm  Reading: Normal sinus rhythm, no acute ST changes, normal axis and intervals, no ectopy             Monitor Interpretation:   normal sinus rhythm as interpreted by me.      Imaging Results Available and  Reviewed while in ED: XR CHEST AP PORTABLE  (CPT=71045)    Result Date: 8/1/2024  CONCLUSION:   No focal opacity, pleural effusion, or pneumothorax.    Dictated by (CST): Codey Garcia MD on 8/01/2024 at 3:22 PM     Finalized by (CST): Codey Garcia MD on 8/01/2024 at 3:22 PM         ED Medications Administered:   Medications   ketorolac (Toradol) 15 MG/ML injection 15 mg (15 mg Intravenous Given 8/1/24 1445)   metoclopramide (Reglan) 5 mg/mL injection 10 mg (10 mg Intravenous Given 8/1/24 1445)   sodium chloride 0.9 % IV bolus 1,000 mL (1,000 mL Intravenous New Bag 8/1/24 1445)             MDM     Vitals:    08/01/24 1336 08/01/24 1445 08/01/24 1515   BP: (S) (!) 182/142 (!) 180/96 (!) 163/108   Pulse: (!) 129 88 89   Resp: 20 20 17   Temp: 97.9 °F (36.6 °C)     TempSrc: Temporal     SpO2: 98% 93% 93%   Weight: 108.9 kg     Height: 172.7 cm (5' 8\")       *I personally reviewed and interpreted all ED vitals.    Independent Interpretation of Studies: I have independently reviewed patient's chest x-ray and there are no remarkable findings    Social Determinants of Health:     Procedures:      Differential/MDM/Shared Decision Making: Differential Diagnosis includes tension headache, migraine headache, hypertension, hypertensive emergency, CHF, dehydration, electrolyte abnormality, dysrhythmia, others.      The patient already has hypertension, chronic back pain, GERD, high cholesterol, sleep apnea, peripheral neuropathy, to contribute to the complexity of this ED evaluation.           Patient's labs, EKG, chest x-ray, are all unremarkable.  Patient received migraine cocktail and his headache is much better.  Discussed blood pressure management with patient and patient will keep a track of his numbers when he is feeling well to take to his primary care doctor to determine if he needs acute changes in blood pressure meds..  Discussed case with patient and wife, and they have no further questions.      Condition upon  leaving the department: Stable    Disposition and Plan     Clinical Impression:  1. Acute nonintractable headache, unspecified headache type    2. Hypertension, unspecified type        Disposition:  Discharge    Follow-up:  Aubrey Sheehan MD  [address] [place] [id number] [phone]    Call in 2 day(s)        Medications Prescribed:  Current Discharge Medication List

## 2024-08-02 NOTE — TELEPHONE ENCOUNTER
See encounter===patient went to ED [facility] yesterday due to headaches and high blood pressure readings.       Disposition    Discharge  ED/IC AVS (Printed 8/1/2024)  Follow-Ups: Call Aubrey Sheehan MD (Family Medicine) in 2 days (8/3/2024)      No future appointments.

## 2024-08-05 NOTE — TELEPHONE ENCOUNTER
FYI: patient has an appointment on 08/26/2024, do patient needs to come and see doctor earlier than that date, if so can we use \"res24\"  Please advise, Thank you.

## 2024-08-05 NOTE — TELEPHONE ENCOUNTER
Can use res 24 slot if any available. Otherwise can see one of other providers/ immediate care if any sig symptoms.

## 2024-08-05 NOTE — TELEPHONE ENCOUNTER
Patient called back. I assisted with scheduling advised visit. He states he sent the [employer] message with elevated readings before the Emergency Department visit. He will continue to monitor his BP and call us with any concerning values. Patient verbalized understanding. No further questions or concerns at this time.    Future Appointments   Date Time Provider Department Center   8/7/2024 10:20 AM Aubrey Sheehan MD [facility]

## 2024-08-07 ENCOUNTER — OFFICE VISIT (OUTPATIENT)
Dept: FAMILY MEDICINE CLINIC | Facility: CLINIC | Age: 59
End: 2024-08-07
Payer: COMMERCIAL

## 2024-08-07 VITALS
HEART RATE: 84 BPM | TEMPERATURE: 98 F | WEIGHT: 242 LBS | HEIGHT: 68 IN | DIASTOLIC BLOOD PRESSURE: 88 MMHG | SYSTOLIC BLOOD PRESSURE: 156 MMHG | RESPIRATION RATE: 18 BRPM | BODY MASS INDEX: 36.68 KG/M2

## 2024-08-07 DIAGNOSIS — J01.90 ACUTE SINUSITIS, RECURRENCE NOT SPECIFIED, UNSPECIFIED LOCATION: ICD-10-CM

## 2024-08-07 DIAGNOSIS — I10 ESSENTIAL HYPERTENSION: Primary | ICD-10-CM

## 2024-08-07 PROCEDURE — 99214 OFFICE O/P EST MOD 30 MIN: CPT | Performed by: FAMILY MEDICINE

## 2024-08-07 PROCEDURE — 3079F DIAST BP 80-89 MM HG: CPT | Performed by: FAMILY MEDICINE

## 2024-08-07 PROCEDURE — 3008F BODY MASS INDEX DOCD: CPT | Performed by: FAMILY MEDICINE

## 2024-08-07 PROCEDURE — 3077F SYST BP >= 140 MM HG: CPT | Performed by: FAMILY MEDICINE

## 2024-08-07 RX ORDER — HYDROCHLOROTHIAZIDE 12.5 MG/1
12.5 TABLET ORAL DAILY
Qty: 30 TABLET | Refills: 1 | Status: SHIPPED | OUTPATIENT
Start: 2024-08-07

## 2024-08-07 RX ORDER — AMOXICILLIN 875 MG/1
875 TABLET, COATED ORAL 2 TIMES DAILY
Qty: 20 TABLET | Refills: 0 | Status: SHIPPED | OUTPATIENT
Start: 2024-08-07

## 2024-08-07 NOTE — PROGRESS NOTES
Subjective:   Patient ID: Andre Howell is a 58 year old male.    Pt presents for follow up from the ER for headache and elevated blood pressure. Patient is taking usual blood pressure medication. Blood pressures at home have been fluctuating. Patient states no sig symptoms except sinus pressure. Testing done in ER unremarkable.     Pt states has sinus congestion and headaches. Taking otc remedies for this. Discussed avoiding otc remedies that increase blood pressure. No fevers or sig chest pains/ shortness of breath.          History/Other:   Review of Systems   Constitutional:  Negative for fever.   HENT:  Positive for congestion and sinus pressure. Negative for ear pain and sore throat.    Respiratory:  Negative for shortness of breath.    Cardiovascular:  Negative for chest pain.   Neurological:  Negative for dizziness and headaches.     Current Outpatient Medications   Medication Sig Dispense Refill    amoxicillin 875 MG Oral Tab Take 1 tablet (875 mg total) by mouth 2 (two) times daily. 20 tablet 0    amoxicillin 875 MG Oral Tab Take 1 tablet (875 mg total) by mouth 2 (two) times daily. 20 tablet 0    hydroCHLOROthiazide 12.5 MG Oral Tab Take 1 tablet (12.5 mg total) by mouth daily. 30 tablet 1    valsartan-hydroCHLOROthiazide 160-12.5 MG Oral Tab Take 1 tablet by mouth daily. 90 tablet 3    Multiple Vitamins-Minerals (MULTIVITAMIN ADULT OR) Take by mouth.      acetaminophen-codeine 300-30 MG Oral Tab Take 1 tablet by mouth every 6 (six) hours as needed for Pain. Medication may causes sedation, constipation. Not to operate heavy machinery or drive on medication. (Patient not taking: Reported on 8/7/2024) 30 tablet 0    MONTELUKAST 10 MG Oral Tab TAKE 1 TABLET BY MOUTH EVERY DAY AT NIGHT (Patient not taking: Reported on 8/7/2024) 30 tablet 0    AZELASTINE  MCG/SPRAY Nasal Solution SPRAY 2 SPRAYS BY NASAL ROUTE DAILY (Patient not taking: Reported on 8/7/2024) 30 mL 2    fluticasone propionate 50  MCG/ACT Nasal Suspension  (Patient not taking: Reported on 8/7/2024)       Allergies:  Allergies   Allergen Reactions    Robaxin [Methocarbamol] RASH, FEVER and UNKNOWN    Seasonal TINITUS       Objective:   Physical Exam  Vitals reviewed.   Constitutional:       Appearance: Normal appearance. He is well-developed.   HENT:      Right Ear: Tympanic membrane and ear canal normal.      Left Ear: Tympanic membrane and ear canal normal.      Nose:      Right Sinus: Maxillary sinus tenderness present.      Left Sinus: Maxillary sinus tenderness present.      Mouth/Throat:      Mouth: Mucous membranes are moist.      Pharynx: No oropharyngeal exudate or posterior oropharyngeal erythema.   Cardiovascular:      Rate and Rhythm: Normal rate and regular rhythm.      Heart sounds: Normal heart sounds.   Pulmonary:      Effort: Pulmonary effort is normal. No respiratory distress.      Breath sounds: Normal breath sounds. No wheezing or rales.   Musculoskeletal:      Cervical back: Normal range of motion and neck supple.   Lymphadenopathy:      Cervical: No cervical adenopathy.   Neurological:      Mental Status: He is alert.         Assessment & Plan:   1. Essential hypertension:  - After discussion, will add additional hydrochlorothiazide as discussed; discussed benefits and potential side effects; To monitor blood pressure at home; To call if sig symptoms or any persistent elevation of blood pressure; Discussed good diet and activity; Follow up as needed. Also can follow up with cardiology as discussed as well.      2. Acute sinusitis, recurrence not specified, unspecified location:  - After discussion with patient, amoxicillin  as directed; Over the counter remedies discussed; To call if worse or not better; Follow up in one week if not resolved or as needed if worse.         No orders of the defined types were placed in this encounter.      Meds This Visit:  Requested Prescriptions     Signed Prescriptions Disp Refills     amoxicillin 875 MG Oral Tab 20 tablet 0     Sig: Take 1 tablet (875 mg total) by mouth 2 (two) times daily.    amoxicillin 875 MG Oral Tab 20 tablet 0     Sig: Take 1 tablet (875 mg total) by mouth 2 (two) times daily.    hydroCHLOROthiazide 12.5 MG Oral Tab 30 tablet 1     Sig: Take 1 tablet (12.5 mg total) by mouth daily.       Imaging & Referrals:  [facility] - INTERNAL

## 2024-09-11 ENCOUNTER — PATIENT MESSAGE (OUTPATIENT)
Dept: FAMILY MEDICINE CLINIC | Facility: CLINIC | Age: 59
End: 2024-09-11

## 2024-09-11 DIAGNOSIS — D49.9 PRECANCEROUS LESION: Primary | ICD-10-CM

## 2024-09-12 NOTE — TELEPHONE ENCOUNTER
From: Andre Howell  To: Aubrey Sheehan  Sent: 9/11/2024 4:45 PM CDT  Subject: Yearly precancerous screening    Last summer I saw Kelby Moise and he removed 3 precancerous lesions from my head. He wanted to see me a year later. That is now. On the referral can you include lesions on the head and torso? I have similar spots on my torso

## 2024-09-13 NOTE — TELEPHONE ENCOUNTER
Referral request noted. Referral approved, signed and sent to [facility].  Pt notified through [employer]

## 2024-10-02 ENCOUNTER — OFFICE VISIT (OUTPATIENT)
Dept: DERMATOLOGY CLINIC | Facility: CLINIC | Age: 59
End: 2024-10-02
Payer: COMMERCIAL

## 2024-10-02 DIAGNOSIS — D49.2 NEOPLASM OF UNSPECIFIED BEHAVIOR OF BONE, SOFT TISSUE, AND SKIN: ICD-10-CM

## 2024-10-02 DIAGNOSIS — D22.9 MULTIPLE BENIGN NEVI: ICD-10-CM

## 2024-10-02 DIAGNOSIS — L57.0 MULTIPLE ACTINIC KERATOSES: Primary | ICD-10-CM

## 2024-10-02 PROCEDURE — 17003 DESTRUCT PREMALG LES 2-14: CPT | Performed by: STUDENT IN AN ORGANIZED HEALTH CARE EDUCATION/TRAINING PROGRAM

## 2024-10-02 PROCEDURE — 88305 TISSUE EXAM BY PATHOLOGIST: CPT | Performed by: STUDENT IN AN ORGANIZED HEALTH CARE EDUCATION/TRAINING PROGRAM

## 2024-10-02 PROCEDURE — 99213 OFFICE O/P EST LOW 20 MIN: CPT | Performed by: STUDENT IN AN ORGANIZED HEALTH CARE EDUCATION/TRAINING PROGRAM

## 2024-10-02 PROCEDURE — 17000 DESTRUCT PREMALG LESION: CPT | Performed by: STUDENT IN AN ORGANIZED HEALTH CARE EDUCATION/TRAINING PROGRAM

## 2024-10-02 PROCEDURE — 11102 TANGNTL BX SKIN SINGLE LES: CPT | Performed by: STUDENT IN AN ORGANIZED HEALTH CARE EDUCATION/TRAINING PROGRAM

## 2024-10-02 RX ORDER — CARVEDILOL 12.5 MG/1
12.5 TABLET ORAL 2 TIMES DAILY
COMMUNITY

## 2024-10-02 NOTE — PROGRESS NOTES
October 2, 2024    Established patient     Referred by Dr. Sheehan     CHIEF COMPLAINT: AK F/U    HISTORY OF PRESENT ILLNESS: .    1. AK F/U   Location: [place]  Duration: Chronic   Signs and symptoms: New spots on torso   Current treatment: None  Past treatments: LN2      DERM HISTORY:  History of skin cancer: No  History of chronic skin disease/condition: No    FAMILY HISTORY:  History of melanoma: No  History of chronic skin disease/condition: No    History/Other:    REVIEW OF SYSTEMS:  Constitutional: Denies fever, chills, unintentional weight loss.   Skin as per HPI    PAST MEDICAL HISTORY:  Past Medical History:    Back pain    Back problem    Calculus of kidney    Cholelithiasis    Colon adenoma    x1    Esophageal reflux    Hearing impairment    High blood pressure    High cholesterol    Neuropathy    Right foot drop, and right leg weakness    Problems with swallowing    R/T fusion, liquids and solids occasionally    Screen for colon cancer    Repeat in 9/2021 due to family hx; no adenomatous polyps in 2016    Sleep apnea    Unspecified essential hypertension    Visual impairment       Medications  Current Outpatient Medications   Medication Sig Dispense Refill    amoxicillin 875 MG Oral Tab Take 1 tablet (875 mg total) by mouth 2 (two) times daily. 20 tablet 0    amoxicillin 875 MG Oral Tab Take 1 tablet (875 mg total) by mouth 2 (two) times daily. 20 tablet 0    hydroCHLOROthiazide 12.5 MG Oral Tab Take 1 tablet (12.5 mg total) by mouth daily. 30 tablet 1    acetaminophen-codeine 300-30 MG Oral Tab Take 1 tablet by mouth every 6 (six) hours as needed for Pain. Medication may causes sedation, constipation. Not to operate heavy machinery or drive on medication. (Patient not taking: Reported on 8/7/2024) 30 tablet 0    valsartan-hydroCHLOROthiazide 160-12.5 MG Oral Tab Take 1 tablet by mouth daily. 90 tablet 3    MONTELUKAST 10 MG Oral Tab TAKE 1 TABLET BY MOUTH EVERY DAY AT NIGHT (Patient not taking: Reported on  8/7/2024) 30 tablet 0    AZELASTINE  MCG/SPRAY Nasal Solution SPRAY 2 SPRAYS BY NASAL ROUTE DAILY (Patient not taking: Reported on 8/7/2024) 30 mL 2    fluticasone propionate 50 MCG/ACT Nasal Suspension  (Patient not taking: Reported on 8/7/2024)      Multiple Vitamins-Minerals (MULTIVITAMIN ADULT OR) Take by mouth.         Objective:    PHYSICAL EXAM:  General: awake, alert, no acute distress  Skin: Skin exam was performed today including the following: face and trunk. Pertinent findings include:   - with numerous brown regular appearing macules  - R abdomen with an irregular brown papule  - scalp with numerous pink gritty papules  - scalp with stellate brown macules    ASSESSMENT & PLAN:  Pathophysiology of diagnoses discussed with patient.  Therapeutic options reviewed. Risks, benefits, and alternatives discussed with patient. Instructions reviewed at length.    #Lentigines  - Discussed benign appearance and provided reassurance. No treatment but observation at this time. Follow-up for concerning physical changes or new symptoms.  - Recommend sun protection with spf 30 or higher, sun protective clothing such as wide brimmed hats and long sleeves. Recommend avoiding midday sun (10 am- 3 pm).     #Multiple benign nevi  - Reassured patient of benign nature of these lesions.   - Return for lesions that are new, growing, changing or symptomatic.   - Recommend daily photoprotection with broad-spectrum sunscreen (SPF 30 daily with reapplication every 2 hours), avoidance of sun during peak hours, and sun protective clothing.   - Dermoscopy was used for physical examination of pigmented lesions during today's office visit.    #Neoplasm(s) of uncertain behavior of skin  - Shave biopsy performed today   - Will notify patient with results and arrange for appropriate definitive treatment, if indicated.      Shave of lesion to establish and confirm diagnosis:  Photo taken: Yes    Risks, benefits, alternatives and  personnel required for shave biopsy reviewed with patient. Risks discussed include, but not limited to: pain, bleeding, infection, scar, reaction to anesthetic, and recurrence/need for further treatment.  Patient and physician agree as to site(s) to be biopsied. Patient verbalizes understanding and wishes to proceed.     Site(s) prepped with alcohol and anesthetized with 1% lidocaine with epinephrine.   Shave of lesion(s) performed to the level of the dermis. Specimen(s) from A. R abdomen  sent for pathology to r/o Nevus 50% ALCL and bandaging applied.   Written and verbal wound care instructions provided to patient, understanding verbalized.    #Multiple actinic keratoses  - Discussed premalignant etiology and possibility of transformation to SCC  - Recommended cryotherapy today   - Discussed side effects including redness, swelling, crusting, and discolortion after treatment, wound care with soap/water and vaseline   - Recommend sun protection with spf 30 or higher, sun protective clothing such as wide brimmed hats and long sleeves. Recommend avoiding midday sun (10 am- 3 pm).     - Procedure Note Cryosurgery of pre-malignant lesion(s)  Risks, benefits, alternatives, complications, and personnel required for cryosurgery reviewed with patient. Patient verbalizes understanding and wishes to proceed.   - Cryosurgery performed with Liquid Nitrogen via cryostat spray gun to Actinic Keratosis . 10 lesion(s) treated.   - Patient tolerated well and wound care discussed. Return if lesions fail to fully resolve.      Return to clinic: 1 year or sooner if something concerning arises     Kelby Moise MD

## 2024-10-08 ENCOUNTER — ORDER TRANSCRIPTION (OUTPATIENT)
Dept: ADMINISTRATIVE | Facility: HOSPITAL | Age: 59
End: 2024-10-08

## 2024-10-08 ENCOUNTER — PATIENT MESSAGE (OUTPATIENT)
Dept: FAMILY MEDICINE CLINIC | Facility: CLINIC | Age: 59
End: 2024-10-08

## 2024-10-08 DIAGNOSIS — I10 ESSENTIAL HYPERTENSION: ICD-10-CM

## 2024-10-08 DIAGNOSIS — R94.31 ABNORMAL EKG: Primary | ICD-10-CM

## 2024-10-08 DIAGNOSIS — R06.02 SOB (SHORTNESS OF BREATH): ICD-10-CM

## 2024-10-09 NOTE — TELEPHONE ENCOUNTER
Dr Sheehan=FELECIA.     [employer] message sent with instruction to contact Cardiologist for the medication refill==see [facility] office visit note below, no note about re starting the amlodipine...      CARE EVERYWHERE  [facility] 10/8/24 visit note;  CHIEF COMPLAINT    Reason for Visit/Chief Complaint   f/u   58-year-old male longstanding history of hypertension presents for follow-up visit. Blood pressure has been persistently elevated recently started on HCTZ 12.5 mg in addition to the existing HCTZ valsartan follow-up and studies echocardiogram and plain GXT stress test blood pressure is better controlled after initiation of Coreg twice daily.       ASSESSMENT  1. Shortness of breath plain GXT showed 1.5 mm ST depression concern for underlying CAD schedule cardiac CTA to evaluate for obstructive CAD.  2. Hypertension  Intolerant of amlodipine continue Coreg 12.5 mg twice daily. Preserved EF echocardiogram  Much better controlled in addition to losartan HCTZ  Low-salt diet.  3. Abnormal EKG anteroseptal Q waves preserved EF on echo  Plan  Cardiac CTA without calcium score follow-up after.  Increased BMI: Provide patient with information regarding diet and lifestyle changes.Recommend low sodium diet, daily exercise and maintain a normal BMI. Recommend monitor blood pressure at home.Increased BMI: Provide patient with information regarding diet and lifestyle changes.

## 2024-10-10 RX ORDER — AMLODIPINE BESYLATE 5 MG/1
5 TABLET ORAL DAILY
Qty: 90 TABLET | Refills: 3 | OUTPATIENT
Start: 2024-10-10

## 2024-11-12 RX ORDER — VALSARTAN AND HYDROCHLOROTHIAZIDE 160; 12.5 MG/1; MG/1
1 TABLET, FILM COATED ORAL DAILY
Qty: 90 TABLET | Refills: 3 | Status: SHIPPED | OUTPATIENT
Start: 2024-11-12

## 2024-11-12 NOTE — TELEPHONE ENCOUNTER
Message noted: Chart reviewed and may refill medication as requested. Prescription sent to listed pharmacy. Pharmacy to notify patient. Pt notified through "[employer]

## 2024-11-20 ENCOUNTER — HOSPITAL ENCOUNTER (OUTPATIENT)
Dept: CT IMAGING | Facility: HOSPITAL | Age: 59
Discharge: HOME | End: 2024-11-20
Attending: INTERNAL MEDICINE
Payer: COMMERCIAL

## 2024-11-20 VITALS — HEIGHT: 68 IN | WEIGHT: 240 LBS | BODY MASS INDEX: 36.37 KG/M2

## 2024-11-20 DIAGNOSIS — R06.02 SOB (SHORTNESS OF BREATH): ICD-10-CM

## 2024-11-20 DIAGNOSIS — I10 ESSENTIAL HYPERTENSION: ICD-10-CM

## 2024-11-20 DIAGNOSIS — R94.31 ABNORMAL EKG: ICD-10-CM

## 2024-11-20 LAB
CREAT BLD-MCNC: 0.8 MG/DL
EGFRCR SERPLBLD CKD-EPI 2021: 103 ML/MIN/1.73M2 (ref 60–?)

## 2024-11-20 PROCEDURE — 82565 ASSAY OF CREATININE: CPT

## 2024-11-20 PROCEDURE — 75574 CT ANGIO HRT W/3D IMAGE: CPT | Performed by: INTERNAL MEDICINE

## 2024-11-20 PROCEDURE — 75580 N-INVAS EST C FFR SW ALY CTA: CPT | Performed by: INTERNAL MEDICINE

## 2024-11-20 RX ORDER — METOPROLOL TARTRATE 25 MG/1
100 TABLET, FILM COATED ORAL ONCE AS NEEDED
Status: DISCONTINUED | OUTPATIENT
Start: 2024-11-20 | End: 2024-11-22

## 2024-11-20 RX ORDER — METOPROLOL TARTRATE 25 MG/1
50 TABLET, FILM COATED ORAL ONCE AS NEEDED
Status: DISCONTINUED | OUTPATIENT
Start: 2024-11-20 | End: 2024-11-22

## 2024-11-20 RX ORDER — METOPROLOL TARTRATE 25 MG/1
100 TABLET, FILM COATED ORAL ONCE AS NEEDED
Status: ACTIVE | OUTPATIENT
Start: 2024-11-20 | End: 2024-11-20

## 2024-11-20 RX ORDER — METOPROLOL TARTRATE 25 MG/1
50 TABLET, FILM COATED ORAL ONCE AS NEEDED
Status: ACTIVE | OUTPATIENT
Start: 2024-11-20 | End: 2024-11-20

## 2024-11-20 RX ORDER — NITROGLYCERIN 0.4 MG/1
0.4 TABLET SUBLINGUAL ONCE
Status: COMPLETED | OUTPATIENT
Start: 2024-11-20 | End: 2024-11-20

## 2024-11-20 RX ORDER — DILTIAZEM HYDROCHLORIDE 5 MG/ML
5 INJECTION INTRAVENOUS SEE ADMIN INSTRUCTIONS
Status: DISCONTINUED | OUTPATIENT
Start: 2024-11-20 | End: 2024-11-22

## 2024-11-20 RX ORDER — METOPROLOL TARTRATE 1 MG/ML
5 INJECTION, SOLUTION INTRAVENOUS SEE ADMIN INSTRUCTIONS
Status: DISCONTINUED | OUTPATIENT
Start: 2024-11-20 | End: 2024-11-22

## 2024-11-20 RX ADMIN — NITROGLYCERIN 0.4 MG: 0.4 TABLET SUBLINGUAL at 08:22:00

## 2024-11-20 NOTE — IMAGING NOTE
TO RAD HOLDING AT 0755      HX TAKEN: chest pressure    PT CONSENTED AT 0802     BASELINE VITAL SIGNS: HR 55  /90 BMI 36.5    CTA ORDERED BY DR HORNE WAS PT GIVEN CTA PREMEDS: YES, 100MG PO METOPROLOL X2 DOSES    18 GAUGE IV STARTED AT 0808 POC TESTING COMPLETED GFR = 103   CREATINE = 0.8    TO CT TABLE @ 0821    CONNECT TO MONITOR  HR 58 /88      NITROGLYCERIN 0.4 MILLIGRAMS SUBLINGUAL GIVEN AT 0822     INJECTION STARTED AT 0826 HR 54 DURING SCAN PROCEDURE COMPLETE    POST SCAN HR 60 /74 AT 0829    PT TO HOLDING AREA  HR 55 /75     AVS  PROVIDED      0850 DISCHARGED HOME

## 2024-12-04 ENCOUNTER — OFFICE VISIT (OUTPATIENT)
Dept: FAMILY MEDICINE CLINIC | Facility: CLINIC | Age: 59
End: 2024-12-04
Payer: COMMERCIAL

## 2024-12-04 VITALS
BODY MASS INDEX: 37.44 KG/M2 | SYSTOLIC BLOOD PRESSURE: 132 MMHG | DIASTOLIC BLOOD PRESSURE: 78 MMHG | RESPIRATION RATE: 16 BRPM | TEMPERATURE: 99 F | HEIGHT: 68 IN | WEIGHT: 247 LBS | HEART RATE: 65 BPM

## 2024-12-04 DIAGNOSIS — I10 ESSENTIAL HYPERTENSION: ICD-10-CM

## 2024-12-04 DIAGNOSIS — F32.A ANXIETY AND DEPRESSION: ICD-10-CM

## 2024-12-04 DIAGNOSIS — F41.9 ANXIETY AND DEPRESSION: ICD-10-CM

## 2024-12-04 DIAGNOSIS — Z00.00 ROUTINE PHYSICAL EXAMINATION: Primary | ICD-10-CM

## 2024-12-04 PROCEDURE — 3075F SYST BP GE 130 - 139MM HG: CPT | Performed by: FAMILY MEDICINE

## 2024-12-04 PROCEDURE — 3078F DIAST BP <80 MM HG: CPT | Performed by: FAMILY MEDICINE

## 2024-12-04 PROCEDURE — 3008F BODY MASS INDEX DOCD: CPT | Performed by: FAMILY MEDICINE

## 2024-12-04 PROCEDURE — 99396 PREV VISIT EST AGE 40-64: CPT | Performed by: FAMILY MEDICINE

## 2024-12-04 RX ORDER — ASPIRIN 81 MG/1
81 TABLET ORAL
COMMUNITY
Start: 2024-11-25

## 2024-12-04 RX ORDER — SERTRALINE HYDROCHLORIDE 25 MG/1
25 TABLET, FILM COATED ORAL DAILY
Qty: 90 TABLET | Refills: 1 | Status: SHIPPED | OUTPATIENT
Start: 2024-12-04

## 2024-12-04 NOTE — PROGRESS NOTES
Subjective:   Patient ID: Andre Howell is a 59 year old male.    Patient is here for routine physical exam. No acute issues. No significant chronic medical problems. Patient is requesting testing. Diet and exercise have been good.   Patient saw cardiology for history of some CAD/hypertension . Has been doing well and stable. Was started on blood pressure medication.  Also some hx of anxiety and depression. Pt was on zoloft in past and would like to restart this at low dose.      Past medical history, family history, and social history were reviewed.          History/Other:   Review of Systems   Constitutional: Negative.  Negative for fever.   HENT: Negative.     Eyes: Negative.    Respiratory: Negative.  Negative for shortness of breath.    Cardiovascular: Negative.  Negative for chest pain.   Gastrointestinal: Negative.  Negative for abdominal pain.   Endocrine: Negative.    Genitourinary: Negative.  Negative for dysuria.   Musculoskeletal: Negative.    Skin: Negative.    Allergic/Immunologic: Negative.    Neurological: Negative.  Negative for dizziness and headaches.   Hematological: Negative.    Psychiatric/Behavioral:  Positive for dysphoric mood. The patient is nervous/anxious.      Current Outpatient Medications   Medication Sig Dispense Refill    aspirin 81 MG Oral Tab EC 1 tablet (81 mg total).      sertraline (ZOLOFT) 25 MG Oral Tab Take 1 tablet (25 mg total) by mouth daily. 90 tablet 1    VALSARTAN-HYDROCHLOROTHIAZIDE 160-12.5 MG Oral Tab TAKE 1 TABLET BY MOUTH DAILY 90 tablet 3    carvedilol 12.5 MG Oral Tab Take 1 tablet (12.5 mg total) by mouth 2 (two) times daily.      AZELASTINE  MCG/SPRAY Nasal Solution SPRAY 2 SPRAYS BY NASAL ROUTE DAILY 30 mL 2    fluticasone propionate 50 MCG/ACT Nasal Suspension       Multiple Vitamins-Minerals (MULTIVITAMIN ADULT OR) Take by mouth.       Allergies:Allergies[1]    Objective:   Physical Exam  Constitutional:       Appearance: Normal appearance. He is  well-developed.   HENT:      Head: Normocephalic.      Right Ear: Tympanic membrane, ear canal and external ear normal.      Left Ear: Tympanic membrane, ear canal and external ear normal.      Nose: Nose normal.      Mouth/Throat:      Mouth: Mucous membranes are moist.      Pharynx: No oropharyngeal exudate or posterior oropharyngeal erythema.   Eyes:      Conjunctiva/sclera: Conjunctivae normal.   Cardiovascular:      Rate and Rhythm: Normal rate and regular rhythm.      Pulses: Normal pulses.      Heart sounds: Normal heart sounds.   Pulmonary:      Effort: Pulmonary effort is normal. No respiratory distress.      Breath sounds: Normal breath sounds. No wheezing or rales.   Abdominal:      General: Abdomen is flat. There is no distension.      Palpations: Abdomen is soft. There is no mass.      Tenderness: There is no abdominal tenderness.   Musculoskeletal:         General: Normal range of motion.      Cervical back: Normal range of motion and neck supple.   Skin:     General: Skin is warm.   Neurological:      General: No focal deficit present.      Mental Status: He is alert and oriented to person, place, and time.      Sensory: No sensory deficit.      Deep Tendon Reflexes: Reflexes are normal and symmetric. Reflexes normal.   Psychiatric:         Mood and Affect: Mood normal.         Behavior: Behavior normal.         Assessment & Plan:   1. Routine physical examination:  - Exam is unremarkable. Screening tests were discussed, and after discussion, will check lab work as below. Healthy diet, exercise, and weight were discussed. To call if problems and follow up and further management after testing. Routine follow up.     2. Anxiety and depression:  - After discussion, will restart sertraline 25mg daily as discussed; discussed benefits and potential side effects; to call if problems or side effects; follow up in one month to reevaluate as needed.      3. Essential hypertension:  - Stable, Will check lab work  as below; Medication reviewed. Follow up and further management after testing. To monitor blood pressure; To call if any persistent elevation of blood pressure; Discussed good diet/activity; Routine follow up with cardiology.         Orders Placed This Encounter   Procedures    Lipid Panel    PSA Total, Screen    CBC, Platelet; No Differential    Comp Metabolic Panel (14)       Meds This Visit:  Requested Prescriptions     Signed Prescriptions Disp Refills    sertraline (ZOLOFT) 25 MG Oral Tab 90 tablet 1     Sig: Take 1 tablet (25 mg total) by mouth daily.       Imaging & Referrals:  None         [1]   Allergies  Allergen Reactions    Robaxin [Methocarbamol] RASH, FEVER and UNKNOWN    Seasonal TINITUS

## 2024-12-13 ENCOUNTER — LAB ENCOUNTER (OUTPATIENT)
Dept: LAB | Age: 59
End: 2024-12-13
Attending: FAMILY MEDICINE
Payer: COMMERCIAL

## 2024-12-13 DIAGNOSIS — Z00.00 ROUTINE PHYSICAL EXAMINATION: ICD-10-CM

## 2024-12-13 LAB
ALBUMIN SERPL-MCNC: 4.5 G/DL (ref 3.2–4.8)
ALBUMIN/GLOB SERPL: 1.9 {RATIO} (ref 1–2)
ALP LIVER SERPL-CCNC: 25 U/L
ALT SERPL-CCNC: 18 U/L
ANION GAP SERPL CALC-SCNC: 9 MMOL/L (ref 0–18)
AST SERPL-CCNC: 22 U/L (ref ?–34)
BILIRUB SERPL-MCNC: 1.5 MG/DL (ref 0.3–1.2)
BUN BLD-MCNC: 12 MG/DL (ref 9–23)
BUN/CREAT SERPL: 12.5 (ref 10–20)
CALCIUM BLD-MCNC: 9.6 MG/DL (ref 8.7–10.4)
CHLORIDE SERPL-SCNC: 109 MMOL/L (ref 98–112)
CHOLEST SERPL-MCNC: 222 MG/DL (ref ?–200)
CO2 SERPL-SCNC: 26 MMOL/L (ref 21–32)
COMPLEXED PSA SERPL-MCNC: 0.31 NG/ML (ref ?–4)
CREAT BLD-MCNC: 0.96 MG/DL
DEPRECATED RDW RBC AUTO: 44.5 FL (ref 35.1–46.3)
EGFRCR SERPLBLD CKD-EPI 2021: 92 ML/MIN/1.73M2 (ref 60–?)
ERYTHROCYTE [DISTWIDTH] IN BLOOD BY AUTOMATED COUNT: 13.1 % (ref 11–15)
FASTING PATIENT LIPID ANSWER: YES
FASTING STATUS PATIENT QL REPORTED: YES
GLOBULIN PLAS-MCNC: 2.4 G/DL (ref 2–3.5)
GLUCOSE BLD-MCNC: 106 MG/DL (ref 70–99)
HCT VFR BLD AUTO: 41.1 %
HDLC SERPL-MCNC: 41 MG/DL (ref 40–59)
HGB BLD-MCNC: 14 G/DL
LDLC SERPL CALC-MCNC: 154 MG/DL (ref ?–100)
MCH RBC QN AUTO: 31.3 PG (ref 26–34)
MCHC RBC AUTO-ENTMCNC: 34.1 G/DL (ref 31–37)
MCV RBC AUTO: 91.7 FL
NONHDLC SERPL-MCNC: 181 MG/DL (ref ?–130)
OSMOLALITY SERPL CALC.SUM OF ELEC: 298 MOSM/KG (ref 275–295)
PLATELET # BLD AUTO: 295 10(3)UL (ref 150–450)
POTASSIUM SERPL-SCNC: 4 MMOL/L (ref 3.5–5.1)
PROT SERPL-MCNC: 6.9 G/DL (ref 5.7–8.2)
RBC # BLD AUTO: 4.48 X10(6)UL
SODIUM SERPL-SCNC: 144 MMOL/L (ref 136–145)
TRIGL SERPL-MCNC: 150 MG/DL (ref 30–149)
VLDLC SERPL CALC-MCNC: 29 MG/DL (ref 0–30)
WBC # BLD AUTO: 7.8 X10(3) UL (ref 4–11)

## 2024-12-13 PROCEDURE — 36415 COLL VENOUS BLD VENIPUNCTURE: CPT

## 2024-12-13 PROCEDURE — 80053 COMPREHEN METABOLIC PANEL: CPT

## 2024-12-13 PROCEDURE — 85027 COMPLETE CBC AUTOMATED: CPT

## 2024-12-13 PROCEDURE — 80061 LIPID PANEL: CPT

## 2025-01-13 ENCOUNTER — APPOINTMENT (OUTPATIENT)
Dept: CT IMAGING | Facility: HOSPITAL | Age: 60
End: 2025-01-13
Attending: EMERGENCY MEDICINE
Payer: COMMERCIAL

## 2025-01-13 ENCOUNTER — HOSPITAL ENCOUNTER (INPATIENT)
Facility: HOSPITAL | Age: 60
LOS: 1 days | Discharge: HOME | End: 2025-01-14
Attending: EMERGENCY MEDICINE | Admitting: HOSPITALIST
Payer: COMMERCIAL

## 2025-01-13 ENCOUNTER — NURSE TRIAGE (OUTPATIENT)
Dept: FAMILY MEDICINE CLINIC | Facility: CLINIC | Age: 60
End: 2025-01-13

## 2025-01-13 DIAGNOSIS — S06.5XAA SDH (SUBDURAL HEMATOMA) (HCC): Primary | ICD-10-CM

## 2025-01-13 DIAGNOSIS — S06.9XAA TRAUMATIC BRAIN INJURY, WITH UNKNOWN LOSS OF CONSCIOUSNESS STATUS, INITIAL ENCOUNTER (HCC): ICD-10-CM

## 2025-01-13 PROBLEM — G93.6 CEREBRAL EDEMA (HCC): Status: ACTIVE | Noted: 2025-01-13

## 2025-01-13 LAB
ANION GAP SERPL CALC-SCNC: 8 MMOL/L (ref 0–18)
APTT PPP: 28.1 SECONDS (ref 23–36)
BASOPHILS # BLD AUTO: 0.04 X10(3) UL (ref 0–0.2)
BASOPHILS NFR BLD AUTO: 0.4 %
BILIRUB UR QL: NEGATIVE
BUN BLD-MCNC: 14 MG/DL (ref 9–23)
BUN/CREAT SERPL: 15.9 (ref 10–20)
CALCIUM BLD-MCNC: 10.1 MG/DL (ref 8.7–10.4)
CHLORIDE SERPL-SCNC: 105 MMOL/L (ref 98–112)
CLARITY UR: CLEAR
CO2 SERPL-SCNC: 28 MMOL/L (ref 21–32)
CREAT BLD-MCNC: 0.88 MG/DL
DEPRECATED RDW RBC AUTO: 43.6 FL (ref 35.1–46.3)
EGFRCR SERPLBLD CKD-EPI 2021: 100 ML/MIN/1.73M2 (ref 60–?)
EOSINOPHIL # BLD AUTO: 0.2 X10(3) UL (ref 0–0.7)
EOSINOPHIL NFR BLD AUTO: 2.1 %
ERYTHROCYTE [DISTWIDTH] IN BLOOD BY AUTOMATED COUNT: 13 % (ref 11–15)
GLUCOSE BLD-MCNC: 108 MG/DL (ref 70–99)
GLUCOSE UR-MCNC: NORMAL MG/DL
HCT VFR BLD AUTO: 40.6 %
HGB BLD-MCNC: 14.7 G/DL
HGB UR QL STRIP.AUTO: NEGATIVE
IMM GRANULOCYTES # BLD AUTO: 0.03 X10(3) UL (ref 0–1)
IMM GRANULOCYTES NFR BLD: 0.3 %
INR BLD: 0.91 (ref 0.8–1.2)
KETONES UR-MCNC: NEGATIVE MG/DL
LEUKOCYTE ESTERASE UR QL STRIP.AUTO: NEGATIVE
LYMPHOCYTES # BLD AUTO: 2.04 X10(3) UL (ref 1–4)
LYMPHOCYTES NFR BLD AUTO: 21.8 %
MCH RBC QN AUTO: 33 PG (ref 26–34)
MCHC RBC AUTO-ENTMCNC: 36.2 G/DL (ref 31–37)
MCV RBC AUTO: 91.2 FL
MONOCYTES # BLD AUTO: 0.93 X10(3) UL (ref 0.1–1)
MONOCYTES NFR BLD AUTO: 10 %
NEUTROPHILS # BLD AUTO: 6.1 X10 (3) UL (ref 1.5–7.7)
NEUTROPHILS # BLD AUTO: 6.1 X10(3) UL (ref 1.5–7.7)
NEUTROPHILS NFR BLD AUTO: 65.4 %
NITRITE UR QL STRIP.AUTO: NEGATIVE
OSMOLALITY SERPL CALC.SUM OF ELEC: 293 MOSM/KG (ref 275–295)
PH UR: 7 [PH] (ref 5–8)
PLATELET # BLD AUTO: 284 10(3)UL (ref 150–450)
POTASSIUM SERPL-SCNC: 4 MMOL/L (ref 3.5–5.1)
PROT UR-MCNC: NEGATIVE MG/DL
PROTHROMBIN TIME: 12.9 SECONDS (ref 11.6–14.8)
RBC # BLD AUTO: 4.45 X10(6)UL
SODIUM SERPL-SCNC: 141 MMOL/L (ref 136–145)
SP GR UR STRIP: 1.01 (ref 1–1.03)
UROBILINOGEN UR STRIP-ACNC: NORMAL
WBC # BLD AUTO: 9.3 X10(3) UL (ref 4–11)

## 2025-01-13 PROCEDURE — 99223 1ST HOSP IP/OBS HIGH 75: CPT | Performed by: STUDENT IN AN ORGANIZED HEALTH CARE EDUCATION/TRAINING PROGRAM

## 2025-01-13 PROCEDURE — 99223 1ST HOSP IP/OBS HIGH 75: CPT | Performed by: HOSPITALIST

## 2025-01-13 PROCEDURE — 70498 CT ANGIOGRAPHY NECK: CPT | Performed by: EMERGENCY MEDICINE

## 2025-01-13 PROCEDURE — 70496 CT ANGIOGRAPHY HEAD: CPT | Performed by: EMERGENCY MEDICINE

## 2025-01-13 RX ORDER — MECLIZINE HYDROCHLORIDE 25 MG/1
25 TABLET ORAL 3 TIMES DAILY PRN
Status: DISCONTINUED | OUTPATIENT
Start: 2025-01-13 | End: 2025-01-14

## 2025-01-13 RX ORDER — MULTIVITAMIN
1 TABLET ORAL DAILY
COMMUNITY

## 2025-01-13 RX ORDER — ACETAMINOPHEN 500 MG
500 TABLET ORAL EVERY 4 HOURS PRN
Status: DISCONTINUED | OUTPATIENT
Start: 2025-01-13 | End: 2025-01-14

## 2025-01-13 RX ORDER — FLUTICASONE PROPIONATE 50 MCG
2 SPRAY, SUSPENSION (ML) NASAL DAILY
COMMUNITY

## 2025-01-13 RX ORDER — MECLIZINE HYDROCHLORIDE 25 MG/1
25 TABLET ORAL ONCE
Status: COMPLETED | OUTPATIENT
Start: 2025-01-13 | End: 2025-01-13

## 2025-01-13 RX ORDER — SERTRALINE HYDROCHLORIDE 25 MG/1
25 TABLET, FILM COATED ORAL DAILY
Status: DISCONTINUED | OUTPATIENT
Start: 2025-01-13 | End: 2025-01-14

## 2025-01-13 RX ORDER — PROCHLORPERAZINE EDISYLATE 5 MG/ML
5 INJECTION INTRAMUSCULAR; INTRAVENOUS EVERY 8 HOURS PRN
Status: DISCONTINUED | OUTPATIENT
Start: 2025-01-13 | End: 2025-01-14

## 2025-01-13 RX ORDER — ONDANSETRON 2 MG/ML
4 INJECTION INTRAMUSCULAR; INTRAVENOUS EVERY 6 HOURS PRN
Status: DISCONTINUED | OUTPATIENT
Start: 2025-01-13 | End: 2025-01-14

## 2025-01-13 RX ORDER — SERTRALINE HYDROCHLORIDE 25 MG/1
25 TABLET, FILM COATED ORAL DAILY
Status: DISCONTINUED | OUTPATIENT
Start: 2025-01-13 | End: 2025-01-13

## 2025-01-13 RX ORDER — CARVEDILOL 12.5 MG/1
12.5 TABLET ORAL 2 TIMES DAILY
Status: DISCONTINUED | OUTPATIENT
Start: 2025-01-13 | End: 2025-01-14

## 2025-01-13 RX ORDER — TEMAZEPAM 15 MG/1
15 CAPSULE ORAL NIGHTLY PRN
Status: DISCONTINUED | OUTPATIENT
Start: 2025-01-13 | End: 2025-01-14

## 2025-01-13 RX ORDER — HYDRALAZINE HYDROCHLORIDE 20 MG/ML
10 INJECTION INTRAMUSCULAR; INTRAVENOUS ONCE
Status: COMPLETED | OUTPATIENT
Start: 2025-01-13 | End: 2025-01-13

## 2025-01-13 RX ORDER — MORPHINE SULFATE 2 MG/ML
1 INJECTION, SOLUTION INTRAMUSCULAR; INTRAVENOUS EVERY 2 HOUR PRN
Status: DISCONTINUED | OUTPATIENT
Start: 2025-01-13 | End: 2025-01-14

## 2025-01-13 RX ORDER — MORPHINE SULFATE 2 MG/ML
2 INJECTION, SOLUTION INTRAMUSCULAR; INTRAVENOUS EVERY 2 HOUR PRN
Status: DISCONTINUED | OUTPATIENT
Start: 2025-01-13 | End: 2025-01-14

## 2025-01-13 RX ORDER — ASPIRIN 81 MG/1
81 TABLET ORAL DAILY
Status: ON HOLD | COMMUNITY
End: 2025-01-14

## 2025-01-13 RX ORDER — HYDRALAZINE HYDROCHLORIDE 20 MG/ML
10 INJECTION INTRAMUSCULAR; INTRAVENOUS EVERY 4 HOURS PRN
Status: DISCONTINUED | OUTPATIENT
Start: 2025-01-13 | End: 2025-01-14

## 2025-01-13 RX ORDER — HYDROCODONE BITARTRATE AND ACETAMINOPHEN 5; 325 MG/1; MG/1
1 TABLET ORAL EVERY 6 HOURS PRN
Status: DISCONTINUED | OUTPATIENT
Start: 2025-01-13 | End: 2025-01-14

## 2025-01-13 RX ORDER — VALSARTAN AND HYDROCHLOROTHIAZIDE 160; 12.5 MG/1; MG/1
1 TABLET, FILM COATED ORAL DAILY
Status: DISCONTINUED | OUTPATIENT
Start: 2025-01-13 | End: 2025-01-13

## 2025-01-13 RX ORDER — MORPHINE SULFATE 4 MG/ML
4 INJECTION, SOLUTION INTRAMUSCULAR; INTRAVENOUS EVERY 2 HOUR PRN
Status: DISCONTINUED | OUTPATIENT
Start: 2025-01-13 | End: 2025-01-14

## 2025-01-13 NOTE — ED PROVIDER NOTES
Patient Seen in: [facility] Emergency Department      History     Chief Complaint   Patient presents with    Fall    Headache     Stated Complaint: Fall on friday + ASA    Subjective:   59-year-old male with hypertension, cervical spine fusion, on a baby aspirin a day, GERD here 3 days status post head injury still with throbbing headaches but intermittent vertigo as well.  Usually worsened with movement.  He slipped in the garage on the ice and hit the back of his head and thinks he briefly lost consciousness.  No posterior neck pain.  He is a little sore on the lateral neck but no weakness or numbness in his extremities.  No current confusion.  No vomiting or diarrhea.  No difficulty walking.  He called his doctor's office and they suggested he come in today.              Objective:     Past Medical History:    Back pain    Back problem    Calculus of kidney    Cholelithiasis    Colon adenoma    x1    Esophageal reflux    Hearing impairment    High blood pressure    High cholesterol    Neuropathy    Right foot drop, and right leg weakness    Problems with swallowing    R/T fusion, liquids and solids occasionally    Screen for colon cancer    Repeat in 9/2021 due to family hx; no adenomatous polyps in 2016    Sleep apnea    Unspecified essential hypertension    Visual impairment              Past Surgical History:   Procedure Laterality Date    Back surgery  2016    hardware removed    Back surgery  2015    Shabbir put in L3 L4    Back surgery  04/2017    C4-5-6 fusion    Cholecystectomy      Colonoscopy  2017    Colonoscopy screening - referral N/A 02/12/2024    Procedure: COLONOSCOPY-SCREENING;  Surgeon: Yuri Canas MD;  Location: [facility] ENDOSCOPY    Egd  02/12/2024    Electrocardiogram, complete  07/19/2012    scanned to media tab, 07/19/2012    Inj paravert f jnt l/s 1 lev Right 09/15/2014    Procedure: FACET INJECTION UNDER FLUOROSCOPY;  Surgeon: Gustavo Gilliam DO;  [facility]    M-sedaj by  phys perfrmg svc 5+ yr Right 09/15/2014    Procedure: FACET INJECTION UNDER FLUOROSCOPY;  Surgeon: Gustavo Gilliam DO;  [facility]    Surgery - external Left 12/07/2018    Rotator cuff surgery Left by Dr. [facility]                Social History     Socioeconomic History    Marital status:    Tobacco Use    Smoking status: Never    Smokeless tobacco: Never   Vaping Use    Vaping status: Never Used   Substance and Sexual Activity    Alcohol use: Not Currently     Comment: occasionally    Drug use: Not Currently     Frequency: 7.0 times per week     Types: Cannabis     Comment: \"medical marijuanna\"   Other Topics Concern    Caffeine Concern Yes     Comment: soda    Grew up on a farm No    History of tanning Yes    Outdoor occupation No    Reaction to local anesthetic No    Pt has a pacemaker No    Pt has a defibrillator No                  Physical Exam     ED Triage Vitals [01/13/25 0912]   BP (!) 184/110   Pulse 70   Resp 18   Temp 98.5 °F (36.9 °C)   Temp src Oral   SpO2 98 %   O2 Device None (Room air)       Current Vitals:   Vital Signs  BP: (!) 156/92  Pulse: 63  Resp: 18  Temp: 98.5 °F (36.9 °C)  Temp src: Oral  MAP (mmHg): (!) 111    Oxygen Therapy  SpO2: 96 %  O2 Device: None (Room air)        Physical Exam  HENT:      Head: Normocephalic and atraumatic.      Right Ear: External ear normal.      Left Ear: External ear normal.      Nose: Nose normal.      Mouth/Throat:      Mouth: Mucous membranes are moist.   Eyes:      Extraocular Movements: Extraocular movements intact.      Pupils: Pupils are equal, round, and reactive to light.   Cardiovascular:      Rate and Rhythm: Normal rate and regular rhythm.      Pulses: Normal pulses.   Pulmonary:      Effort: Pulmonary effort is normal.      Breath sounds: Normal breath sounds.   Abdominal:      Palpations: Abdomen is soft.      Tenderness: There is no abdominal tenderness.   Musculoskeletal:          General: No swelling. Normal range of motion.      Cervical back: Normal range of motion and neck supple. No tenderness.   Lymphadenopathy:      Cervical: No cervical adenopathy.   Skin:     General: Skin is warm.      Capillary Refill: Capillary refill takes less than 2 seconds.   Neurological:      General: No focal deficit present.      Mental Status: He is alert.      Sensory: No sensory deficit.      Motor: No weakness.             ED Course     Labs Reviewed   BASIC METABOLIC PANEL (8) - Abnormal; Notable for the following components:       Result Value    Glucose 108 (*)     All other components within normal limits   CBC WITH DIFFERENTIAL WITH PLATELET   PTT, ACTIVATED   PROTHROMBIN TIME (PT)   RAINBOW DRAW LAVENDER   RAINBOW DRAW LIGHT GREEN   RAINBOW DRAW BLUE   RAINBOW DRAW GOLD       ED Course as of 01/13/25 1128  ------------------------------------------------------------  Time: 01/13 1102  Comment: Labs and CT independently interpreted by me.  CBC BMP normal.  Patient has subdural hematoma 4 mm.  No dissection on CTA              MDM      CTA BRAIN + CTA CAROTIDS (CPT=70496/78795)    Result Date: 1/13/2025  CONCLUSION:   1. Acute subdural hematoma along the left aspect of the falx cerebri measuring 4 mm in thickness.  Recommend continued attention on follow-up imaging. 2. No mass effect, midline shift, hydrocephalus, or transcortical area of hypoattenuation to suggest an acute infarct. 3. No large vessel occlusion, flow-limiting stenosis, aneurysm, or dissection in the head or neck. 4. Mild medialization of the right vocal cord, which is nonspecific but can be seen in the setting of vocal cord paralysis. 5. Lesser incidental findings described above.    Impression points 1 and 3 or communicated via telephone to Valentin ESPINOZA at 11:01 a.m. on 01/13/2025 by Codey Garcia MD  Dictated by (CST): Codey Garcia MD on 1/13/2025 at 10:52 AM     Finalized by (CST): Codey Garcia MD on 1/13/2025 at  11:04 AM             Admission disposition: 1/13/2025 11:26 AM           Medical Decision Making  Patient with head injury 3 days ago and now headaches and intermittent vertigo.  Differential could include but not limited to concussion, intracranial hemorrhage, vertebral artery dissection, contusion, peripheral vertigo and many other possibilities.  Unlikely a stroke unless he has a vertebral artery dissection.  Will give meclizine and order CTA head and neck.  C-spine injury cleared clinically.  Pulse ox 98% on room air    Amount and/or Complexity of Data Reviewed  External Data Reviewed: notes.     Details: Office notes reviewed to confirm medical history and medications  Labs: ordered.  Radiology: ordered and independent interpretation performed. Decision-making details documented in ED Course.  Discussion of management or test interpretation with external provider(s): Discussed with Dr. Ross from neurosurgery as well as the hospitalist Dr. Marquis.  Patient neurologically intact at this time.  Will admit to PCU for close monitoring.    Risk  Decision regarding hospitalization.  Risk Details: Baby aspirin, hypertension        Disposition and Plan     Clinical Impression:  1. SDH (subdural hematoma) (HCC)         Disposition:  Admit  1/13/2025 11:26 am    Follow-up:  No follow-up provider specified.        Medications Prescribed:  Current Discharge Medication List              Supplementary Documentation:         Hospital Problems       Present on Admission  Date Reviewed: 12/4/2024            ICD-10-CM Noted POA    * (Principal) SDH (subdural hematoma) (HCC) S06.5XAA 1/13/2025 Unknown

## 2025-01-13 NOTE — ED INITIAL ASSESSMENT (HPI)
Patient from home with complaints of worsening nausea, confusion, headache, sore neck, pain to the back of his head since Friday. Slip and fall on the ice on Friday. Unsure of LOC.

## 2025-01-13 NOTE — ED QUICK NOTES
Orders for admission, patient is aware of plan and ready to go upstairs. Any questions, please call ED SHAYLEE Velásquez  at extension [phone].   Type of COVID test sent:None  COVID Suspicion level: Low    Titratable drug(s) infusing:  Rate:    LOC at time of transport:A&O x 3    Other pertinent information    CIWA score=  NIH score=

## 2025-01-13 NOTE — TELEPHONE ENCOUNTER
Action Requested: Summary for Provider     []  Critical Lab, Recommendations Needed  [] Need Additional Advice  []   FYI    []   Need Orders  [] Need Medications Sent to Pharmacy  []  Other     SUMMARY: Patient shoveling Saturday, slipped on ice and hit head hard on conrete.   Patient having headache, nausea, dizziness and feeling lightheaded. No LOC at the time of injury, remembers what happen. Feels like he may throw up but has not.     Patient advised ER now for eval. He is agreeable and will have someone bring her to ER now at [facility].       Reason for call: Head Injury  Onset: Saturday       Reason for Disposition   Dangerous injury (e.g., MVA, diving, trampoline, contact sports, fall > 10 feet or 3 meters) or severe blow from hard object (e.g., golf club or baseball bat)    Protocols used: Head Injury-A-OH

## 2025-01-13 NOTE — ED QUICK NOTES
Pt presents to the ED for eval of headache. Pt states he slipped and fell in the garage on Friday. Since fall, pt has had posterior head pain, nausea, and dizziness. Pt has been taking Tylenol with no relief. Pt call PCP this morning and advised to come to ED. Here for further eval.

## 2025-01-13 NOTE — H&P
[facility]    PATIENT'S NAME: EMILY TAYLOR   ATTENDING PHYSICIAN: Serafin Hanson MD   PATIENT ACCOUNT#:   [id number]    LOCATION:  [address]  MEDICAL RECORD #:   [id number]       YOB: 1965  ADMISSION DATE:       01/13/2025    HISTORY AND PHYSICAL EXAMINATION    CHIEF COMPLAINT:  Subdural hematoma.    HISTORY OF PRESENT ILLNESS:  Patient is a 59-year-old  male who slid and fell on the ice, hitting the back of his head on Friday, 3 days ago.  He did not seek medical attention at that time, but he continued to have dizziness,lightheadedness, and headache.  Today in the emergency room, CBC, chemistry, and urinalysis were all unremarkable.  CT head and neck with angiogram showed acute subdural hematoma along the aspect of the falx cerebri measuring 4 mm in thickness, no other focal findings.  Patient will be admitted to the hospital based on Neurosurgery recommendations for further close management and monitoring.     PAST MEDICAL HISTORY:  Hypertension, osteoarthritis, obesity, gastroesophageal reflux disease, kidney stones, hyperlipidemia, depression.    PAST SURGICAL HISTORY:  Lumbar laminectomy and fusion twice, anterior cervical discectomy and fusion, left carpal tunnel release, cholecystectomy, and left rotator cuff repair.      MEDICATIONS:  Please see medication reconciliation list.     ALLERGIES:  Robaxin.    FAMILY HISTORY:  Mother had hypertension and kidney cancer.  Father had hypertension and heart disease.     SOCIAL HISTORY:  No tobacco, alcohol, or drug use.  Independent in his basic activities of daily living.     REVIEW OF SYSTEMS:  Patient reports headache, occipital in location, associated with nausea and intermittent dizziness.  He also describes mild polyuria.  He denies any other pain.  Other 12-point review of systems is negative.       PHYSICAL EXAMINATION:    GENERAL:  Alert and oriented to time, place and person.  Mild distress.   VITAL SIGNS:   Temperature 98.5, pulse 66, respiratory rate 18, blood pressure 156/92, pulse ox 94% on room air.  HEENT:  Atraumatic.  Oropharynx clear.  Moist mucous membranes.  Normal hard and soft palate.  Eyes:  Anicteric sclerae.    NECK:  Supple.  No lymphadenopathy.  Trachea midline.  Full range of motion.   LUNGS:  Clear to auscultation bilaterally.  Normal respiratory effort.    HEART:  Regular rate and rhythm.  S1 and S2 auscultated.  No murmur.    ABDOMEN:  Soft, nondistended.  No tenderness.  Positive bowel sounds.   EXTREMITIES:  No peripheral edema, clubbing or cyanosis.   NEUROLOGIC:  Motor and sensory intact.      ASSESSMENT:    1.   Mechanical fall and closed head trauma with left falx 4 mm subdural hematoma.  2.   Essential hypertension.    PLAN:  Patient will be admitted to progressive care unit.  Close monitoring with neuro checks.  IV hydralazine p.r.n., keep blood pressure below 150.  Hold aspirin.  Neurosurgery consult.  Fall precautions.  Further recommendations to follow.     Dictated By Jada Marquis MD  d: 01/13/2025 12:19:05  t: 01/13/2025 12:49:35  Job [id number]  FB/    cc: Serafin Hanson MD

## 2025-01-14 ENCOUNTER — APPOINTMENT (OUTPATIENT)
Dept: CT IMAGING | Facility: HOSPITAL | Age: 60
End: 2025-01-14
Attending: HOSPITALIST
Payer: COMMERCIAL

## 2025-01-14 VITALS
BODY MASS INDEX: 37.99 KG/M2 | OXYGEN SATURATION: 93 % | DIASTOLIC BLOOD PRESSURE: 84 MMHG | HEIGHT: 68 IN | WEIGHT: 250.69 LBS | RESPIRATION RATE: 11 BRPM | HEART RATE: 54 BPM | TEMPERATURE: 98 F | SYSTOLIC BLOOD PRESSURE: 125 MMHG

## 2025-01-14 LAB
ANION GAP SERPL CALC-SCNC: 10 MMOL/L (ref 0–18)
BASOPHILS # BLD AUTO: 0.04 X10(3) UL (ref 0–0.2)
BASOPHILS NFR BLD AUTO: 0.6 %
BUN BLD-MCNC: 17 MG/DL (ref 9–23)
BUN/CREAT SERPL: 17.2 (ref 10–20)
CALCIUM BLD-MCNC: 9.7 MG/DL (ref 8.7–10.4)
CHLORIDE SERPL-SCNC: 104 MMOL/L (ref 98–112)
CO2 SERPL-SCNC: 28 MMOL/L (ref 21–32)
CREAT BLD-MCNC: 0.99 MG/DL
DEPRECATED RDW RBC AUTO: 43.9 FL (ref 35.1–46.3)
EGFRCR SERPLBLD CKD-EPI 2021: 89 ML/MIN/1.73M2 (ref 60–?)
EOSINOPHIL # BLD AUTO: 0.25 X10(3) UL (ref 0–0.7)
EOSINOPHIL NFR BLD AUTO: 3.5 %
ERYTHROCYTE [DISTWIDTH] IN BLOOD BY AUTOMATED COUNT: 13 % (ref 11–15)
GLUCOSE BLD-MCNC: 101 MG/DL (ref 70–99)
HCT VFR BLD AUTO: 39 %
HGB BLD-MCNC: 13.7 G/DL
IMM GRANULOCYTES # BLD AUTO: 0.02 X10(3) UL (ref 0–1)
IMM GRANULOCYTES NFR BLD: 0.3 %
LYMPHOCYTES # BLD AUTO: 1.97 X10(3) UL (ref 1–4)
LYMPHOCYTES NFR BLD AUTO: 27.4 %
MCH RBC QN AUTO: 32.5 PG (ref 26–34)
MCHC RBC AUTO-ENTMCNC: 35.1 G/DL (ref 31–37)
MCV RBC AUTO: 92.6 FL
MONOCYTES # BLD AUTO: 0.74 X10(3) UL (ref 0.1–1)
MONOCYTES NFR BLD AUTO: 10.3 %
NEUTROPHILS # BLD AUTO: 4.17 X10 (3) UL (ref 1.5–7.7)
NEUTROPHILS # BLD AUTO: 4.17 X10(3) UL (ref 1.5–7.7)
NEUTROPHILS NFR BLD AUTO: 57.9 %
OSMOLALITY SERPL CALC.SUM OF ELEC: 296 MOSM/KG (ref 275–295)
PLATELET # BLD AUTO: 272 10(3)UL (ref 150–450)
POTASSIUM SERPL-SCNC: 3.7 MMOL/L (ref 3.5–5.1)
RBC # BLD AUTO: 4.21 X10(6)UL
SODIUM SERPL-SCNC: 142 MMOL/L (ref 136–145)
WBC # BLD AUTO: 7.2 X10(3) UL (ref 4–11)

## 2025-01-14 PROCEDURE — 70450 CT HEAD/BRAIN W/O DYE: CPT | Performed by: HOSPITALIST

## 2025-01-14 PROCEDURE — 99233 SBSQ HOSP IP/OBS HIGH 50: CPT | Performed by: STUDENT IN AN ORGANIZED HEALTH CARE EDUCATION/TRAINING PROGRAM

## 2025-01-14 PROCEDURE — 99239 HOSP IP/OBS DSCHRG MGMT >30: CPT | Performed by: INTERNAL MEDICINE

## 2025-01-14 RX ORDER — HYDROCODONE BITARTRATE AND ACETAMINOPHEN 5; 325 MG/1; MG/1
1 TABLET ORAL EVERY 6 HOURS PRN
Qty: 30 TABLET | Refills: 0 | Status: SHIPPED | OUTPATIENT
Start: 2025-01-14

## 2025-01-14 RX ORDER — ASPIRIN 81 MG/1
81 TABLET ORAL DAILY
Qty: 30 TABLET | Refills: 0 | Status: SHIPPED | OUTPATIENT
Start: 2025-01-28

## 2025-01-14 NOTE — DISCHARGE SUMMARY
[facility]  part of [facility]    DISCHARGE SUMMARY     Andre Howell Patient Status:  Inpatient    1965 MRN [id number]   Location [facility] 2W/SW Attending Don Padilla MD   Hosp Day # 1 PCP Aubrey Sheehan MD     Date of Admission: 2025  Date of Discharge:  2025    Discharge Disposition: Home or Self Care    Discharge Diagnosis:     Mechanical fall with SDH  HTN    History of Present Illness:     Patient is a 59-year-old  male who slid and fell on the ice, hitting the back of his head on Friday, 3 days ago. He did not seek medical attention at that time, but he continued to have dizziness,lightheadedness, and headache. Today in the emergency room, CBC, chemistry, and urinalysis were all unremarkable. CT head and neck with angiogram showed acute subdural hematoma along the aspect of the falx cerebri measuring 4 mm in thickness, no other focal findings. Patient will be admitted to the hospital based on Neurosurgery recommendations for further close management and monitoring.     Brief Synopsis:     Patient has been cleared for discharge by nsx and will follow up with PCP and Nsx as opt.   Hold ASA for 2 weeks or until repeat imaging and opt follow up.   Discharge meds per nsx.  SBP goal < 150  Pain meds per nsx.     Patient is to remain compliant with all discharge medications and instructions and to follow up as advised.   Patient encouraged to make healthy lifestyle and dietary changes.    Lace+ Score: 45  59-90 High Risk  29-58 Medium Risk  0-28   Low Risk       TCM Follow-Up Recommendation:  LACE > 58: High Risk of readmission after discharge from the hospital.      Procedures during hospitalization:   None     Incidental or significant findings and recommendations (brief descriptions):  none    Lab/Test results pending at Discharge:   none    Consultants:  Nsx     Discharge Medication List:     Discharge Medications        START taking these  medications        Instructions Prescription details   HYDROcodone-acetaminophen 5-325 MG Tabs  Commonly known as: Norco      Take 1 tablet by mouth every 6 (six) hours as needed for Pain.   Quantity: 30 tablet  Refills: 0            CHANGE how you take these medications        Instructions Prescription details   aspirin 81 MG Tbec  Start taking on: January 28, 2025  What changed: These instructions start on January 28, 2025. If you are unsure what to do until then, ask your doctor or other care provider.      Take 1 tablet (81 mg total) by mouth daily.   Quantity: 30 tablet  Refills: 0            CONTINUE taking these medications        Instructions Prescription details   azelastine 137 MCG/SPRAY Soln      SPRAY 2 SPRAYS BY NASAL ROUTE DAILY   Quantity: 30 mL  Refills: 2     carvedilol 12.5 MG Tabs  Commonly known as: Coreg      Take 1 tablet (12.5 mg total) by mouth 2 (two) times daily.   Refills: 0     fluticasone propionate 50 MCG/ACT Susp  Commonly known as: Flonase      2 sprays by Each Nare route daily.   Refills: 0     Multi-Vitamin Daily Tabs      Take 1 tablet by mouth daily.   Refills: 0     sertraline 25 MG Tabs  Commonly known as: Zoloft      Take 1 tablet (25 mg total) by mouth daily.   Quantity: 90 tablet  Refills: 1     valsartan-hydroCHLOROthiazide 160-12.5 MG Tabs  Commonly known as: Diovan HCT      TAKE 1 TABLET BY MOUTH DAILY   Quantity: 90 tablet  Refills: 3               Where to Get Your Medications        These medications were sent to [id number] - [place], [place] - [address] AT [facility], [phone], [phone]  [address], [place] [address]      Phone: [phone]   aspirin 81 MG Tbec  HYDROcodone-acetaminophen 5-325 MG Tabs         [place]    Follow-up appointment:   Aubrey Sheehan MD  [address] [place] [id number] [phone]    Schedule an appointment as soon as possible for a visit in   day(s)      Anton Haddad MD  [address] [facility] [address]  [facility] [address]  [phone]    Follow up in 1 week(s)      Appointments for Next 30 Days 1/14/2025 - 2/13/2025        Date Arrival Time Visit Type Length Department Provider     1/28/2025  3:00 PM  [facility]IN [facility] HEAD [1263] 30 min [facility] [place] [facility] CT RM1    Patient Instructions:     Please arrive 15 minutes prior to your scheduled appointment time.      Location Instructions:     Your appointment will be at the [facility] located at [address] [facility] [address] [address] [facility] in [place], [place].&nbsp; The phone number for this location is [phone].  If you suspect you may be pregnant please consult with your physician prior to your exam.&nbsp; If you have a continuous glucose monitor you will be asked to remove it during the exam.  Please bring your insurance card and photo ID. You will also need to bring your doctor's order unless your physician's office submitted the order electronically or faxed the order. Without the order, your test may be delayed or postponed.&nbsp; Certain health screening tests may not require an order.  Children: Children under the age of 12 must have another adult caregiver with them.&nbsp; Please do not bring your child/children without a caregiver.&nbsp; Because of the highly sensitive equipment and privacy of all our patients, children will not be permitted in the exam rooms, unless otherwise noted and in accordance with departmental policy.  PATIENT RESPONSIBILITY ESTIMATE  - (Estimate) We will provide you with your estimated remaining deductible and coinsurance balance for your services at the time of check in.  - (Payment) Please be aware that you may be asked for payment at the time of service.  Masks are optional for all patients and visitors, unless otherwise indicated.               1/29/2025  9:15 AM  HOSPITAL FOLLOW UP [0070] 60 min [facility] Curt Poon PA-C     Patient Instructions:         Location Instructions:     Masks are optional for all patients and visitors, unless otherwise indicated.                      Vital signs:  Temp:  [97.2 °F (36.2 °C)-98.4 °F (36.9 °C)] 97.6 °F (36.4 °C)  Pulse:  [47-69] 54  Resp:  [5-19] 11  BP: (104-171)/(70-96) 125/84  SpO2:  [88 %-96 %] 93 %    Physical Exam:    Gen: NAD AO x3  Chest: good air entry CTABL  CVS: normal s1 and s2 RR  Abd: NABS soft NT ND  Neuro: CN 2-12 grossly intact  Ext: no edema in bilateral LE    -----------------------------------------------------------------------------------------------  PATIENT DISCHARGE INSTRUCTIONS: See electronic chart    Don Padilla MD  Hospitalist    Time spent:  > 30 minutes    The 21st Century Cures Act makes medical notes like these available to patients in the interest of transparency. Please be advised this is a medical document. Medical documents are intended to carry relevant information, facts as evident, and the clinical opinion of the practitioner. The medical note is intended as peer to peer communication and may appear blunt or direct. It is written in medical language and may contain abbreviations or verbiage that are unfamiliar.

## 2025-01-14 NOTE — PROGRESS NOTES
[facility]  Neurosurgery Progress Note    Andre Howell Patient Status:  Observation    1965 MRN [id number]   Location [facility] 2W/SW Attending Jermaine Huerta MD   Hosp Day # 0 PCP Aubrey Sheehan MD     Chief Complaint:  SDH    Subjective:  Pt examined, reports severe frontal HA well controlled with pain medications. He has not been out of bed yet this morning. Continues to have nausea, no vomiting. Has been able to tolerate a regular diet.    Objective/Physical Exam:    Vital Signs:  Blood pressure 109/70, pulse 52, temperature 97.8 °F (36.6 °C), temperature source Temporal, resp. rate 11, height 68\", weight 250 lb 10.6 oz (113.7 kg), SpO2 93%.    Respiratory:  Respirations nonlabored    CV:  NSR on tele    General: NAD, Speech Fluent, Mood Appropriate    Neurologic: This patient is alert and orientated x 3.  Able to follow commands.  Face is symmetric. PERRLA +3 brisk, EOMI.  CN 2-12 GI,  Sensation to light touch is intact bilaterally.  Finger-to-nose coordination is intact.  No Pronator Drift.  Neg Saab's.  No clonus. Strength 5/5 in all extremities.  DTRs 2+ UE and LE.        Labs:  Lab Results   Component Value Date    WBC 7.2 2025    HGB 13.7 2025    HCT 39.0 2025    .0 2025    CREATSERUM 0.99 2025    BUN 17 2025     2025    K 3.7 2025     2025    CO2 28.0 2025     2025    CA 9.7 2025    PTT 28.1 2025    INR 0.91 2025    PTP 12.9 2025       Imaging:  CT BRAIN OR HEAD (CPT=70450)    Result Date: 2025  CONCLUSION: 4 mm subdural hematoma along the anterior-left margin of the interhemispheric fissure without significant mass effect.  No significant interval change.    Dictated by (CST): José Antonio Rolle MD on 2025 at 7:25 AM     Finalized by (CST): José Antonio Rolle MD on 2025 at 7:27 AM          CTA BRAIN + CTA CAROTIDS (CPT=70496/49125)    Result Date:  1/13/2025  CONCLUSION:   1. Acute subdural hematoma along the left aspect of the falx cerebri measuring 4 mm in thickness.  Recommend continued attention on follow-up imaging. 2. No mass effect, midline shift, hydrocephalus, or transcortical area of hypoattenuation to suggest an acute infarct. 3. No large vessel occlusion, flow-limiting stenosis, aneurysm, or dissection in the head or neck. 4. Mild medialization of the right vocal cord, which is nonspecific but can be seen in the setting of vocal cord paralysis. 5. Lesser incidental findings described above.    Impression points 1 and 3 or communicated via telephone to Valentin ESPINOZA at 11:01 a.m. on 01/13/2025 by Codey Garcia MD  Dictated by (CST): Codey Garcia MD on 1/13/2025 at 10:52 AM     Finalized by (CST): Codey Garcia MD on 1/13/2025 at 11:04 AM            Assessment:  Mr. Howell is a 59M with PMH HTN, GERD, HLD presenting after a slip on ice 1/10 with headstrike sustaining a likely traumatic subdural hemorrhage over the left falx from a backward fall, exacerbated by daily aspirin use.      PLAN:  - Optimization per Critical Care/Hosptalist  - Maintain fall precautions and monitor for any progression of symptoms such as worsening headache, nausea/vomiting, or neurologic deficits.  - SBP goal <150 mmHg using IV or oral antihypertensives (e.g., hydralazine as needed) to reduce the risk of hematoma expansion.  - Hold Aspirin for a minimum of two weeks or until repeat imaging confirms stability/resolution of the hematoma.  - Provide analgesia (e.g., acetaminophen) for headache. Avoid NSAIDs  - Consider antiemetics (e.g., ondansetron) for persistent nausea.  - Educated patient about warning signs: severe or worsening headache, confusion, new focal neurologic symptoms, or vomiting that could indicate hematoma expansion.  - Advised avoidance of strenuous activities or situations that could lead to another fall  - Ok to discharge home from neurosurgery standpoint  once deemed appropriate by primary team. Plan for outpatient follow-up with repeat head CT wo prior to appt in approximately two weeks to rule out delayed expansion or chronic subdural development. Patient to call our office at [phone] if any questions, concerns or neuro changes. All questions answered in detail    Will discuss with Dr Catie Girard PA-C  [facility]  1/14/2025 8:05 AM       Is this a shared or split note between Advanced Practice Provider and Physician? Yes

## 2025-01-14 NOTE — CONSULTS
[facility]  part of [facility]  Neurosurgery Consult Note      Andre Howell  11/20/1965  [id number]  PCP: Aubrey Sheehan MD  Consulting Provider: Serafin Hanson MD ([facility] ED)    REASON FOR CONSULT:  SDH    HISTORY OF PRESENT ILLNESS:  Andre Howell is a(n) 59 year old male who slipped on ice in his garage three days ago (Friday), hitting the back of his head. He is unsure if he briefly lost consciousness at the time but recalls experiencing persistent headache starting immediately thereafter, followed by nausea that began approximately two hours post-injury. He denies any vomiting; however, this morning, he felt close to vomiting, prompting him to contact his primary care provider. He has continued to have intermittent dizziness and a throbbing occipital headache that worsens with movement. His past medical history includes hypertension, hyperlipidemia, GERD, and multiple spine surgeries. He takes a daily baby aspirin for cardiovascular prophylaxis and is otherwise hemodynamically stable. A CTA of the brain and neck in the ED revealed a 4 mm acute subdural hematoma along the left aspect of the falx cerebri, with no midline shift or significant mass effect. He is being admitted for further observation, given ongoing symptoms and aspirin use.    PAST MEDICAL HISTORY:  Past Medical History:    Back pain    Back problem    Calculus of kidney    Cholelithiasis    Colon adenoma    x1    Esophageal reflux    Hearing impairment    High blood pressure    High cholesterol    Neuropathy    Right foot drop, and right leg weakness    Problems with swallowing    R/T fusion, liquids and solids occasionally    Screen for colon cancer    Repeat in 9/2021 due to family hx; no adenomatous polyps in 2016    Sleep apnea    Unspecified essential hypertension    Visual impairment     PAST SURGICAL HISTORY:  Past Surgical History:   Procedure Laterality Date    Back surgery  2016     hardware removed    Back surgery  2015    Shabbir put in L3 L4    Back surgery  04/2017    C4-5-6 fusion    Cholecystectomy      Colonoscopy  2017    Colonoscopy screening - referral N/A 02/12/2024    Procedure: COLONOSCOPY-SCREENING;  Surgeon: Yuri Canas MD;  Location: [facility] ENDOSCOPY    Egd  02/12/2024    Electrocardiogram, complete  07/19/2012    scanned to media tab, 07/19/2012    Inj paravert f jnt l/s 1 lev Right 09/15/2014    Procedure: FACET INJECTION UNDER FLUOROSCOPY;  Surgeon: Gustavo Gilliam DO;  Location: [facility]    M-sedaj by  phys perfrmg svc 5+ yr Right 09/15/2014    Procedure: FACET INJECTION UNDER FLUOROSCOPY;  Surgeon: Gustavo Gilliam DO;  Location: [facility]    Surgery - external Left 12/07/2018    Rotator cuff surgery Left by [facility]     FAMILY HISTORY:  family history includes Cancer in his maternal grandfather and mother; Colon Cancer in his maternal grandfather; Colon Polyps in his mother; Depression in an other family member; Heart Disease in his father; Hypertension in his brother, father, and mother; Thyroid disease in his mother.    SOCIAL HISTORY:   reports that he has never smoked. He has never used smokeless tobacco. He reports that he does not currently use alcohol. He reports that he does not currently use drugs after having used the following drugs: Cannabis. Frequency: 7.00 times per week.    ALLERGIES:  Allergies[1]    MEDICATIONS:  Medications Ordered Prior to Encounter[2]    REVIEW OF SYSTEMS:  All other systems were reviewed and were negative except for those previously mentioned in the HPI    PHYSICAL EXAMINATION:  General: No acute distress.  Respiratory: Non-labored respirations bilaterally. No audible wheezing  Cardiovascular: Extremities warm and well-perfused.  Abdomen: Soft, nontender, nondistended.   Musculoskeletal: Moves all extremities well, symmetrically.  Extremities: No edema    NEUROLOGIC  EXAMINATION:  Mental status: Alert and oriented x 3  Speech: Clear, fluent  Cranial nerves: PERRLA, EOMI, face symmetric, with normal strength and sensation, tongue and palate midline, SCM 5/5 bilaterally  Motor:     RIGHT  Delt 5/5   Bic 5/5  Tri 5/5   HI 5/5    5/5  IP 5/5   Quad 5/5   Ham 5/5   AT 5/5   EHL 5/5 Quang 5/5     LEFT    Delt 5/5   Bic 5/5  Tri 5/5   HI 5/5    5/5  IP 5/5   Quad 5/5   Ham 5/5   AT 5/5   EHL 5/5 Quang 5/5   No pronator drift  Tone: Normal  Atrophy/Fasciculations: None  Sensation: Normal to light touch, symmetric, no neglect  Cerebellar: Normal finger nose finger  Gait: Normal, nondistressed heel toe tandem gait      Reflexes: 2+ throughout, symmetric, no Rashmi's    IMAGING:  CTA Brain + Carotids (1/13/2025): A 4 mm acute subdural hematoma along the left aspect of the falx cerebri, mainly anteriorly. No hydrocephalus, midline shift, or large parenchymal lesion. No evidence of flow-limiting stenosis, dissection, or aneurysm in the carotid or vertebral arteries. Minimal atherosclerotic plaque bilaterally, but no significant narrowing.    ASSESSMENT:  Mr. Howell sustained a likely traumatic subdural hemorrhage over the left falx from a backward fall, exacerbated by daily aspirin use. Although the subdural collection is small (4 mm) and currently without mass effect, the patient’s persistent headache and episodic nausea warrant close in-hospital observation. His neurologic exam is intact, and he denies any focal deficits. Blood pressure is elevated but controllable. Given the delayed presentation and the ongoing risk of hematoma enlargement on aspirin therapy, serial imaging and clinical monitoring are advisable to ensure stability.    PLAN:  - Admit with frequent neuro checks (every hour if in ICU setting).  - Maintain fall precautions and monitor for any progression of symptoms such as worsening headache, nausea/vomiting, or neurologic deficits.  - Obtain a repeat head CT in am  to confirm stability of the subdural hematoma.  - If stable, plan outpatient follow-up imaging in approximately two weeks to rule out delayed expansion or chronic subdural development.  - Target systolic <150 mmHg using IV or oral antihypertensives (e.g., hydralazine as needed) to reduce the risk of hematoma expansion.  - Hold Aspirin for a minimum of two weeks or until repeat imaging confirms stability/resolution of the hematoma.  - Provide analgesia (e.g., acetaminophen) for headache.  - Consider antiemetics (e.g., ondansetron) for persistent nausea.  - Educated patient about warning signs: severe or worsening headache, confusion, new focal neurologic symptoms, or vomiting that could indicate hematoma expansion.  - Advised avoidance of strenuous activities or situations that could lead to another fall.    Anton Haddad MD  Neurological Surgery    [address], [address]  [place] [address]  [phone]  Pager [phone]  1/13/2025 7:40 PM      This note was created using a voice-recognition transcribing system. Incorrect words or phrases may have been missed during proofreading. Please interpret accordingly.       [1]   Allergies  Allergen Reactions    Robaxin [Methocarbamol] RASH, FEVER and UNKNOWN    Seasonal TINITUS   [2]   No current facility-administered medications on file prior to encounter.     Current Outpatient Medications on File Prior to Encounter   Medication Sig Dispense Refill    aspirin 81 MG Oral Tab EC Take 1 tablet (81 mg total) by mouth daily.      fluticasone propionate 50 MCG/ACT Nasal Suspension 2 sprays by Each Nare route daily.      Multiple Vitamin (MULTI-VITAMIN DAILY) Oral Tab Take 1 tablet by mouth daily.      sertraline (ZOLOFT) 25 MG Oral Tab Take 1 tablet (25 mg total) by mouth daily. 90 tablet 1    VALSARTAN-HYDROCHLOROTHIAZIDE 160-12.5 MG Oral Tab TAKE 1 TABLET BY MOUTH DAILY 90 tablet 3    carvedilol 12.5 MG Oral Tab  Take 1 tablet (12.5 mg total) by mouth 2 (two) times daily.      AZELASTINE  MCG/SPRAY Nasal Solution SPRAY 2 SPRAYS BY NASAL ROUTE DAILY 30 mL 2

## 2025-01-14 NOTE — PLAN OF CARE
Mr. Valdez had difficulty sleeping last night. He reports he usually has trouble sleeping in the hospital and the bed isn't very comfortable to him.     No neuro changes/issues were noted overnight and he was able to go to this a.m.'s repeat head CT via wheelchair.    Problem: Patient Centered Care  Goal: Patient preferences are identified and integrated in the patient's plan of care  Description: Interventions:  - What would you like us to know as we care for you? I live with my wife and help to care for our grandkids.  - Provide timely, complete, and accurate information to patient/family  - Incorporate patient and family knowledge, values, beliefs, and cultural backgrounds into the planning and delivery of care  - Encourage patient/family to participate in care and decision-making at the level they choose  - Honor patient and family perspectives and choices  Outcome: Progressing     Problem: Patient/Family Goals  Goal: Patient/Family Long Term Goal  Description: Patient's Long Term Goal: to go home    Interventions:  - safety, provider orders  - See additional Care Plan goals for specific interventions  Outcome: Progressing  Goal: Patient/Family Short Term Goal  Description: Patient's Short Term Goal: no more head ache    Interventions:   - medications, distractions, ice packs  - See additional Care Plan goals for specific interventions  Outcome: Progressing     Problem: HEMATOLOGIC - ADULT  Goal: Maintains hematologic stability  Description: INTERVENTIONS  - Assess for signs and symptoms of bleeding or hemorrhage  - Monitor labs and vital signs for trends  - Administer supportive blood products/factors, fluids and medications as ordered and appropriate  - Administer supportive blood products/factors as ordered and appropriate  Outcome: Progressing  Goal: Free from bleeding injury  Description: (Example usage: patient with low platelets)  INTERVENTIONS:  - Avoid intramuscular injections, enemas and rectal medication  administration  - Ensure safe mobilization of patient  - Hold pressure on venipuncture sites to achieve adequate hemostasis  - Assess for signs and symptoms of internal bleeding  - Monitor lab trends  - Patient is to report abnormal signs of bleeding to staff  - Avoid use of toothpicks and dental floss  - Use electric shaver for shaving  - Use soft bristle tooth brush  - Limit straining and forceful nose blowing  Outcome: Progressing     Problem: MUSCULOSKELETAL - ADULT  Goal: Return mobility to safest level of function  Description: INTERVENTIONS:  - Assess patient stability and activity tolerance for standing, transferring and ambulating w/ or w/o assistive devices  - Assist with transfers and ambulation using safe patient handling equipment as needed  - Ensure adequate protection for wounds/incisions during mobilization  - Obtain PT/OT consults as needed  - Advance activity as appropriate  - Communicate ordered activity level and limitations with patient/family  Outcome: Progressing     Problem: NEUROLOGICAL - ADULT  Goal: Achieves stable or improved neurological status  Description: INTERVENTIONS  - Assess for and report changes in neurological status  - Initiate measures to prevent increased intracranial pressure  - Maintain blood pressure and fluid volume within ordered parameters to optimize cerebral perfusion and minimize risk of hemorrhage  - Monitor temperature, glucose, and sodium. Initiate appropriate interventions as ordered  Outcome: Progressing  Goal: Absence of seizures  Description: INTERVENTIONS  - Monitor for seizure activity  - Administer anti-seizure medications as ordered  - Monitor neurological status  Outcome: Progressing  Goal: Achieves maximal functionality and self care  Description: INTERVENTIONS  - Monitor swallowing and airway patency with patient fatigue and changes in neurological status  - Encourage and assist patient to increase activity and self care with guidance from PT/OT  -  Encourage visually impaired, hearing impaired and aphasic patients to use assistive/communication devices  Outcome: Progressing

## 2025-01-16 ENCOUNTER — PATIENT OUTREACH (OUTPATIENT)
Dept: CASE MANAGEMENT | Age: 60
End: 2025-01-16

## 2025-01-16 NOTE — PROGRESS NOTES
Please contact patient for assistance with scheduling a follow-up appointment for  transitional care clinic, if declines PCP .  Thank you!

## 2025-01-17 NOTE — PROGRESS NOTES
TCM reached out for scheduling assistance.    Aubrey Sheehan M.D.  [address] [address]  [phone]    Attempt #2:  Left message on voicemail for patient to call transitions specialist back to schedule follow up appointments. Provided Transitions specialist scheduling phone number [phone].

## 2025-01-20 NOTE — PROGRESS NOTES
TCM reached out for scheduling assistance.    Aubrey Sheehan M.D.  [address] [address]  [phone]    Attempt #3:  Left message on voicemail for patient to call transitions specialist back to schedule follow up appointments. Provided Transitions specialist scheduling phone number [phone]. Closing encounter. Will re-open if patient returns call.

## 2025-01-27 ENCOUNTER — TELEPHONE (OUTPATIENT)
Dept: CASE MANAGEMENT | Age: 60
End: 2025-01-27

## 2025-01-27 DIAGNOSIS — S06.5XAA SDH (SUBDURAL HEMATOMA) (HCC): Primary | ICD-10-CM

## 2025-01-27 DIAGNOSIS — S06.9XAA TRAUMATIC BRAIN INJURY, WITH UNKNOWN LOSS OF CONSCIOUSNESS STATUS, INITIAL ENCOUNTER (HCC): ICD-10-CM

## 2025-01-27 NOTE — TELEPHONE ENCOUNTER
Dr. Sheehan,     Dr. Haddad's office requesting referral to Curt Poon for appointment 1/29/25    Pended referral please review diagnosis and sign off if you agree.    Thank you.  Lizeth Perkins  [facility]

## 2025-01-28 ENCOUNTER — HOSPITAL ENCOUNTER (OUTPATIENT)
Dept: CT IMAGING | Age: 60
Discharge: HOME | End: 2025-01-28
Attending: PHYSICIAN ASSISTANT
Payer: COMMERCIAL

## 2025-01-28 DIAGNOSIS — S06.5XAA SDH (SUBDURAL HEMATOMA) (HCC): ICD-10-CM

## 2025-01-28 DIAGNOSIS — S06.9XAA TRAUMATIC BRAIN INJURY, WITH UNKNOWN LOSS OF CONSCIOUSNESS STATUS, INITIAL ENCOUNTER (HCC): ICD-10-CM

## 2025-01-28 PROCEDURE — 70450 CT HEAD/BRAIN W/O DYE: CPT | Performed by: PHYSICIAN ASSISTANT

## 2025-01-29 ENCOUNTER — OFFICE VISIT (OUTPATIENT)
Dept: SURGERY | Facility: CLINIC | Age: 60
End: 2025-01-29
Payer: COMMERCIAL

## 2025-01-29 ENCOUNTER — OFFICE VISIT (OUTPATIENT)
Dept: FAMILY MEDICINE CLINIC | Facility: CLINIC | Age: 60
End: 2025-01-29
Payer: COMMERCIAL

## 2025-01-29 VITALS
HEART RATE: 73 BPM | WEIGHT: 243 LBS | TEMPERATURE: 98 F | HEIGHT: 68 IN | RESPIRATION RATE: 18 BRPM | DIASTOLIC BLOOD PRESSURE: 82 MMHG | SYSTOLIC BLOOD PRESSURE: 142 MMHG | BODY MASS INDEX: 36.83 KG/M2

## 2025-01-29 VITALS
OXYGEN SATURATION: 99 % | BODY MASS INDEX: 36.37 KG/M2 | SYSTOLIC BLOOD PRESSURE: 148 MMHG | HEART RATE: 69 BPM | HEIGHT: 68 IN | WEIGHT: 240 LBS | DIASTOLIC BLOOD PRESSURE: 107 MMHG

## 2025-01-29 DIAGNOSIS — R42 DIZZINESS: ICD-10-CM

## 2025-01-29 DIAGNOSIS — S06.5XAA SUBDURAL HEMATOMA (HCC): Primary | ICD-10-CM

## 2025-01-29 DIAGNOSIS — S06.0XAD CONCUSSION WITH UNKNOWN LOSS OF CONSCIOUSNESS STATUS, SUBSEQUENT ENCOUNTER: ICD-10-CM

## 2025-01-29 DIAGNOSIS — Z86.69 HISTORY OF MIGRAINE: ICD-10-CM

## 2025-01-29 DIAGNOSIS — R51.9 PERSISTENT HEADACHES: ICD-10-CM

## 2025-01-29 DIAGNOSIS — S06.0X0D CONCUSSION WITHOUT LOSS OF CONSCIOUSNESS, SUBSEQUENT ENCOUNTER: ICD-10-CM

## 2025-01-29 DIAGNOSIS — R41.89 DIFFICULTY PROCESSING INFORMATION: ICD-10-CM

## 2025-01-29 DIAGNOSIS — S06.5XAA SDH (SUBDURAL HEMATOMA) (HCC): Primary | ICD-10-CM

## 2025-01-29 DIAGNOSIS — M54.12 CERVICAL RADICULOPATHY: ICD-10-CM

## 2025-01-29 PROCEDURE — 3008F BODY MASS INDEX DOCD: CPT | Performed by: FAMILY MEDICINE

## 2025-01-29 PROCEDURE — 3077F SYST BP >= 140 MM HG: CPT | Performed by: FAMILY MEDICINE

## 2025-01-29 PROCEDURE — 3079F DIAST BP 80-89 MM HG: CPT | Performed by: FAMILY MEDICINE

## 2025-01-29 PROCEDURE — 3077F SYST BP >= 140 MM HG: CPT | Performed by: STUDENT IN AN ORGANIZED HEALTH CARE EDUCATION/TRAINING PROGRAM

## 2025-01-29 PROCEDURE — 3080F DIAST BP >= 90 MM HG: CPT | Performed by: STUDENT IN AN ORGANIZED HEALTH CARE EDUCATION/TRAINING PROGRAM

## 2025-01-29 PROCEDURE — 3008F BODY MASS INDEX DOCD: CPT | Performed by: STUDENT IN AN ORGANIZED HEALTH CARE EDUCATION/TRAINING PROGRAM

## 2025-01-29 PROCEDURE — 99213 OFFICE O/P EST LOW 20 MIN: CPT | Performed by: STUDENT IN AN ORGANIZED HEALTH CARE EDUCATION/TRAINING PROGRAM

## 2025-01-29 PROCEDURE — 99495 TRANSJ CARE MGMT MOD F2F 14D: CPT | Performed by: FAMILY MEDICINE

## 2025-01-29 RX ORDER — BUTALBITAL, ACETAMINOPHEN AND CAFFEINE 50; 325; 40 MG/1; MG/1; MG/1
1 TABLET ORAL EVERY 4 HOURS PRN
Qty: 60 TABLET | Refills: 0 | Status: SHIPPED | OUTPATIENT
Start: 2025-01-29

## 2025-01-29 NOTE — PROGRESS NOTES
Subjective:   Andre Howell is a 59 year old male who presents for hospital follow up.   He was discharged from [facility] to Home or Self Care  Admission Date: 25   Discharge Date: 25  Hospital Discharge Diagnosis: subdural hematoma    Interactive contact within 2 business days post discharge first initiated on Date: 2025    During the visit, the following was completed:  Obtained and reviewed discharge summary, continuity of care documents, and Hospitalist notes  Reviewed Labs (CBC, CMP) and CT radiology results    HPI: Patient is here for follow up from recent hospitalization for a fall and subdural hematoma. The patient was admitted and seen by neurosurgery. See discharge note for details of admission and hospital course below.  The patient states symptoms have slowly improved. Some headaches still. Has hx of migraines. Has follow up CT scan and appointment with neurosurgery after scan. Pt denies any vomiting or sig visual issues.   Pt also has hx of back and neck issues which have been stable. Pt needs new orthopedic specialist as his previous provider is no longer in network. Also needs referral for neurology for concussion and hx of migraines.       Discharge Summary  Don Padilla MD (Physician)  Hospitalist  [facility]  part of [facility]     DISCHARGE SUMMARY            Andre Howell Patient Status:  Inpatient    1965 MRN [id number]   Location [facility] 2W/ Attending Don Padilla MD   Hosp Day # 1 PCP Aubrey Sheehan MD      Date of Admission: 2025  Date of Discharge:  2025     Discharge Disposition: Home or Self Care     Discharge Diagnosis:      Mechanical fall with SDH  HTN     History of Present Illness:      Patient is a 59-year-old  male who slid and fell on the ice, hitting the back of his head on Friday, 3 days ago. He did not seek medical attention at that time, but he continued to have dizziness,lightheadedness, and  headache. Today in the emergency room, CBC, chemistry, and urinalysis were all unremarkable. CT head and neck with angiogram showed acute subdural hematoma along the aspect of the falx cerebri measuring 4 mm in thickness, no other focal findings. Patient will be admitted to the hospital based on Neurosurgery recommendations for further close management and monitoring.      Brief Synopsis:      Patient has been cleared for discharge by nsx and will follow up with PCP and Nsx as opt.   Hold ASA for 2 weeks or until repeat imaging and opt follow up.   Discharge meds per nsx.  SBP goal < 150  Pain meds per nsx.      Patient is to remain compliant with all discharge medications and instructions and to follow up as advised.   Patient encouraged to make healthy lifestyle and dietary changes.     Lace+ Score: 45  59-90 High Risk  29-58 Medium Risk  0-28   Low Risk     TCM Follow-Up Recommendation:  LACE > 58: High Risk of readmission after discharge from the hospital.        Procedures during hospitalization:   None      Incidental or significant findings and recommendations (brief descriptions):  none     Lab/Test results pending at Discharge:   none     Consultants:  Nsx      Discharge Medication List:      Discharge Medications          START taking these medications         Instructions Prescription details   HYDROcodone-acetaminophen 5-325 MG Tabs  Commonly known as: Norco       Take 1 tablet by mouth every 6 (six) hours as needed for Pain.                 History/Other:   Current Medications:  Medication Reconciliation:  I am aware of an inpatient discharge within the last 30 days.  The discharge medication list has been reconciled with the patient's current medication list and reviewed by me. See medication list for additions of new medication, and changes to current doses of medications and discontinued medications.  Outpatient Medications Marked as Taking for the 1/29/25 encounter (Office Visit) with Sissy,  MD Aubrey   Medication Sig    butalbital-acetaminophen-caffeine -40 MG Oral Tab Take 1 tablet by mouth every 4 (four) hours as needed for Headaches.    HYDROcodone-acetaminophen 5-325 MG Oral Tab Take 1 tablet by mouth every 6 (six) hours as needed for Pain.    aspirin 81 MG Oral Tab EC Take 1 tablet (81 mg total) by mouth daily.    fluticasone propionate 50 MCG/ACT Nasal Suspension 2 sprays by Each Nare route daily.    Multiple Vitamin (MULTI-VITAMIN DAILY) Oral Tab Take 1 tablet by mouth daily.    sertraline (ZOLOFT) 25 MG Oral Tab Take 1 tablet (25 mg total) by mouth daily.    VALSARTAN-HYDROCHLOROTHIAZIDE 160-12.5 MG Oral Tab TAKE 1 TABLET BY MOUTH DAILY    carvedilol 12.5 MG Oral Tab Take 1 tablet (12.5 mg total) by mouth 2 (two) times daily.    AZELASTINE  MCG/SPRAY Nasal Solution SPRAY 2 SPRAYS BY NASAL ROUTE DAILY       Review of Systems   Respiratory:  Negative for shortness of breath.    Cardiovascular:  Negative for chest pain.   Gastrointestinal:  Negative for abdominal pain.   Musculoskeletal:  Negative for back pain. Neck pain: stable.  Neurological:  Positive for headaches. Negative for dizziness and light-headedness.   Psychiatric/Behavioral:  Negative for dysphoric mood. The patient is not nervous/anxious.           Objective:   Physical Exam  Constitutional:       Appearance: Normal appearance.   HENT:      Head: Normocephalic.      Comments: Scalp; no deformity or swelling noted.  Cardiovascular:      Rate and Rhythm: Normal rate and regular rhythm.      Heart sounds: Normal heart sounds.   Pulmonary:      Effort: Pulmonary effort is normal.      Breath sounds: Normal breath sounds.   Neurological:      General: No focal deficit present.      Mental Status: He is alert and oriented to person, place, and time.      Cranial Nerves: No cranial nerve deficit.      Sensory: No sensory deficit.      Motor: No weakness.          /82   Pulse 73   Temp 98 °F (36.7 °C) (Temporal)    Resp 18   Ht 5' 8\" (1.727 m)   Wt 243 lb (110.2 kg)   BMI 36.95 kg/m²  Estimated body mass index is 36.95 kg/m² as calculated from the following:    Height as of this encounter: 5' 8\" (1.727 m).    Weight as of this encounter: 243 lb (110.2 kg).    Assessment & Plan:   1. SDH (subdural hematoma): reviewed discharge summary and CT scans:  - After discussion with patient, to monitor for symptoms and call if any significant symptoms; ER if any sig symptoms; to follow up for repeat CT scan and with neurosurgery as planned.     2. Concussion without loss of consciousness, subsequent encounter:  - After discussion, will send to neurology for further evaluation and treatment; To call if any significant symptoms.     -     Neuro Referral - [facility] ([place])    3. History of migraine:  - Stable, continue present management and follow up with neurology    -     Neuro Referral - [facility] ([place])    4. Cervical radiculopathy: hx of back and neck issues  - After discussion, referral for orthopedics provied for further evaluation and treatment; To call if any significant symptoms.     -     Ortho Referral - In Network        No follow-ups on file.

## 2025-01-29 NOTE — PROGRESS NOTES
Established Neurosurgery Patient    Patient: Andre Howell  Medical Record Number: [id number]  YOB: 1965  PCP: Aubrey Sheehan MD    Reason for visit: SDH follow up    HISTORY OF PRESENTING ILLNESS:  Andre Howell is a pleasant 59 year old male who returns to the neurosurgery clinic today for ongoing surveillance of a SDH suffered s/p fall on ice while on 81 mg aspirin daily for cardiovascular prophylaxis.  He was initially seen in consultation by the neurosurgery team on 1/13/2025.  Imaging at the time revealed an acute 4 mm left falx cerebri SDH.  Serial scanning demonstrated stability and he was eventually discharged.  He has a new CT for review.  Today, the patient endorses residual issues from the fall.  He continues to have non-intractable headaches that are bothersome in nature.  During conversations, he sometimes has difficulty with processing the information.  He notes some dizziness and depth perception issues.  No changes in speech or weakness in his upper and lower extremities.  He does note some shoulder pathology bilaterally that contributes to some weakness, but this is not new since the fall.  He has been out of Norco.  He reports compliance with remaining off of aspirin and has not taken any NSAIDs.  He states that due to some blockage in his arteries, his cardiologist recommended taking a prophylactic baby aspirin daily.  No other neurologic complaints at this time.  Overall since the fall, he has seen some slight improvements in the symptoms.    Past Medical History:    Back pain    Back problem    Calculus of kidney    Cholelithiasis    Colon adenoma    x1    Esophageal reflux    Hearing impairment    High blood pressure    High cholesterol    Neuropathy    Right foot drop, and right leg weakness    Problems with swallowing    R/T fusion, liquids and solids occasionally    Screen for colon cancer    Repeat in 9/2021 due to family hx; no adenomatous polyps in 2016    Sleep  apnea    Unspecified essential hypertension    Visual impairment      Past Surgical History:   Procedure Laterality Date    Back surgery  2016    hardware removed    Back surgery  2015    Shabbir put in L3 L4    Back surgery  04/2017    C4-5-6 fusion    Cholecystectomy      Colonoscopy  2017    Colonoscopy screening - referral N/A 02/12/2024    Procedure: COLONOSCOPY-SCREENING;  Surgeon: Yuri Canas MD;  Location: [facility] ENDOSCOPY    Egd  02/12/2024    Electrocardiogram, complete  07/19/2012    scanned to media tab, 07/19/2012    Inj paravert f jnt l/s 1 lev Right 09/15/2014    Procedure: FACET INJECTION UNDER FLUOROSCOPY;  Surgeon: Gustavo Gilliam DO;  Location: [facility]    M-sedaj by  phys perfrmg svc 5+ yr Right 09/15/2014    Procedure: FACET INJECTION UNDER FLUOROSCOPY;  Surgeon: Gustavo Gilliam DO;  [place] [facility] [place] [facility]    Surgery - external Left 12/07/2018    Rotator cuff surgery Left by [facility]      Family History   Problem Relation Age of Onset    Hypertension Mother     Thyroid disease Mother     Cancer Mother         kidney    Colon Polyps Mother     Hypertension Father     Heart Disease Father     Hypertension Brother     Cancer Maternal Grandfather         colon    Colon Cancer Maternal Grandfather     Depression Other       Social History     Socioeconomic History    Marital status:    Tobacco Use    Smoking status: Never    Smokeless tobacco: Never   Vaping Use    Vaping status: Never Used   Substance and Sexual Activity    Alcohol use: Not Currently     Comment: occasionally    Drug use: Not Currently     Frequency: 7.0 times per week     Types: Cannabis     Comment: \"medical marijuanna\"   Other Topics Concern    Caffeine Concern Yes     Comment: soda    Grew up on a farm No    History of tanning Yes    Outdoor occupation No    Reaction to local anesthetic No    Pt has a pacemaker No    Pt has a defibrillator No      Allergies[1]    Current Medications:  Current Outpatient Medications   Medication Sig Dispense Refill    HYDROcodone-acetaminophen 5-325 MG Oral Tab Take 1 tablet by mouth every 6 (six) hours as needed for Pain. 30 tablet 0    aspirin 81 MG Oral Tab EC Take 1 tablet (81 mg total) by mouth daily. 30 tablet 0    fluticasone propionate 50 MCG/ACT Nasal Suspension 2 sprays by Each Nare route daily.      Multiple Vitamin (MULTI-VITAMIN DAILY) Oral Tab Take 1 tablet by mouth daily.      sertraline (ZOLOFT) 25 MG Oral Tab Take 1 tablet (25 mg total) by mouth daily. 90 tablet 1    VALSARTAN-HYDROCHLOROTHIAZIDE 160-12.5 MG Oral Tab TAKE 1 TABLET BY MOUTH DAILY 90 tablet 3    carvedilol 12.5 MG Oral Tab Take 1 tablet (12.5 mg total) by mouth 2 (two) times daily.      AZELASTINE  MCG/SPRAY Nasal Solution SPRAY 2 SPRAYS BY NASAL ROUTE DAILY 30 mL 2        REVIEW OF SYSTEMS:  Comprehensive review of systems completed and negative with the exception of aforementioned information in the HPI.     PHYSICAL EXAM:    1/29/2025     9:02 AM      /107BP. 148/107. Data is abnormal. Taken on 1/29/25 9:02 AM   BP Location Right arm   Patient Position Sitting   Cuff Size adult   Pulse 69   Weight 240 lb (108.9 kg)   Height INCHES 68\"   SpO2 99 %   Pain Score 4 - (Moderate)Pain Score. 4 - (Moderate). The comment is left sided back of the head, headcahes, lightheadness, dizziness. Taken on 1/29/25 9:02 AM   Pain Loc Head     Wt Readings from Last 6 Encounters:   01/13/25 250 lb 10.6 oz (113.7 kg)   12/04/24 247 lb (112 kg)   11/20/24 240 lb (108.9 kg)   08/07/24 242 lb (109.8 kg)   08/01/24 240 lb (108.9 kg)   05/01/24 240 lb (108.9 kg)        General: Well developed, well nourished, in no acute distress.     HEENT: Normocephalic, atraumatic.    Respirations: Non-labored     Neurologic / Musculoskeletal: Awake, alert, and interactive. Recent and remote memory appear intact. Attention span and concentration are appropriate. No dysarthria.  Appropriately names objects. Coordination and motor control grossly intact. Patient follows commands briskly and appropriately. No pronator drift.    Cervical Spine:    Motor/ Extremities   Deltoid Biceps Triceps    Sensation   R 5/5 5/5 5/5 5/5 Intact to light touch   L 5/5 5/5 5/5 5/5 Intact to light touch       Lumbar Spine:    Motor / Extremities   Hip   flexion Knee   extension Dorsiflexion Plantarflexion   Sensation   R 5/5 5/5 5/5 5/5 Intact to light touch   L 5/5 5/5 5/5 5/5 Intact to light touch     Cranial Nerves:  I Smell not tested   II Visual acuity at baseline.  Visual fields intact.  Pupils equal, round, reactive to light and accommodating bilaterally.   III, VII No ptosis appreciated   III, IV, VI EOMs intact with full ROM   V Facial sensation intact to light touch   VII No facial asymmetry   VIII Hearing normal to voice   IX, X Soft palate elevation and gag reflex not tested   XI Spinal accessory muscles intact with 5/5 strength bilaterally   XII Tongue strength normal, midline, without fasciculations or deviations       IMAGING:  CT brain without contrast 1/28/2025  Awaiting radiology read.  No new hemorrhage is appreciated.  Slight improvement in the interhemispheric SDH from roughly 4 mm to 2 mm upon my interpretation.  No mass effect or midline shift.    CT BRAIN OR HEAD (CPT=70450)    Result Date: 1/14/2025  CONCLUSION: 4 mm subdural hematoma along the anterior-left margin of the interhemispheric fissure without significant mass effect.  No significant interval change.    Dictated by (CST): José Antonio Rolle MD on 1/14/2025 at 7:25 AM     Finalized by (CST): José Antonio Rolle MD on 1/14/2025 at 7:27 AM          CTA BRAIN + CTA CAROTIDS (CPT=70496/75401)    Result Date: 1/13/2025  CONCLUSION:   1. Acute subdural hematoma along the left aspect of the falx cerebri measuring 4 mm in thickness.  Recommend continued attention on follow-up imaging. 2. No mass effect, midline shift, hydrocephalus, or  transcortical area of hypoattenuation to suggest an acute infarct. 3. No large vessel occlusion, flow-limiting stenosis, aneurysm, or dissection in the head or neck. 4. Mild medialization of the right vocal cord, which is nonspecific but can be seen in the setting of vocal cord paralysis. 5. Lesser incidental findings described above.    Impression points 1 and 3 or communicated via telephone to Valentin ESPINOZA at 11:01 a.m. on 01/13/2025 by Codey Garcia MD  Dictated by (CST): Codey Garcia MD on 1/13/2025 at 10:52 AM     Finalized by (CST): Codey Garcia MD on 1/13/2025 at 11:04 AM                  Imaging reviewed. Images shown to patient in the room today.    ASSESSMENT / PLAN:    ICD-10-CM    1. Subdural hematoma (HCC)  S06.5XAA CT BRAIN OR HEAD (58343) [[id number]]      2. Persistent headaches  R51.9 CT BRAIN OR HEAD (18341) [[id number]]      3. Concussion with unknown loss of consciousness status, subsequent encounter  S06.0XAD CT BRAIN OR HEAD (08272) [[id number]]      4. Dizziness  R42 CT BRAIN OR HEAD (25835) [[id number]]      5. Difficulty processing information  R41.89 CT BRAIN OR HEAD (67028) [[id number]]          Andre Howell returns to the clinic today for continued surveillance of a small SDH suffered s/p fall on 81 mg aspirin. His outpatient repeat CT brain demonstrates improvement in the known interhemispheric SDH.  No new acute hemorrhages.  Recommended that he obtain another CT brain scan in 1 month for continued surveillance.  In the interim, recommend that he trial Fioricet for his headaches.  He is neurologically intact on examination.  Discussed that he has a concussion secondary to his TBI.  I encouraged persistent hydration, routine caffeine use, and avoidance of screen time/mental rest. We will see him back in a month with a repeat scan, but he will follow up sooner if symptoms worsen or he develops new neurologic symptoms.    -Medications Prescribed: none  -Imaging Ordered: CT brain without contrast  in 1 month  -Referrals Placed: none  -Follow up: 1 month   -Continue to hold ASA    Plan was reviewed and discussed in detail with the patient. Patient encouraged to call the office with any questions or concerns of new/worsening neurologic symptoms. Patient demonstrated good understanding and was agreeable with the plan.     Visit time: 25 minutes   Over 50% of that time was spent providing patient education and discussing care plan.    Curt Poon PA-C  Physician Assistant- Neurosurgery   [facility]  1/29/2025, 8:51 AM               [1]   Allergies  Allergen Reactions    Robaxin [Methocarbamol] RASH, FEVER and UNKNOWN    Seasonal TINITUS

## 2025-01-29 NOTE — PATIENT INSTRUCTIONS
Refill policies:    Allow 2-3 business days for refills; controlled substances may take longer.  Contact your pharmacy at least 5 days prior to running out of medication and have them send an electronic request or submit request through the “request refill” option in your [employer] account.  Refills are not addressed on weekends; covering physicians do not authorize routine medications on weekends.  No narcotics or controlled substances are refilled after noon on Fridays or by on call physicians.  By law, narcotics must be electronically prescribed.  A 30 day supply with no refills is the maximum allowed.  If your prescription is due for a refill, you may be due for a follow up appointment.  To best provide you care, patients receiving routine medications need to be seen at least once a year.  Patients receiving narcotic/controlled substance medications need to be seen at least once every 3 months.  In the event that your preferred pharmacy does not have the requested medication in stock (e.g. Backordered), it is your responsibility to find another pharmacy that has the requested medication available.  We will gladly send a new prescription to that pharmacy at your request.    Scheduling Tests:    If your physician has ordered radiology tests such as MRI or CT scans, please contact Central Scheduling at [phone] right away to schedule the test.  Once scheduled, the [facility] Centralized Referral Team will work with your insurance carrier to obtain pre-certification or prior authorization.  Depending on your insurance carrier, approval may take 3-10 days.  It is highly recommended patients assure they have received an authorization before having a test performed.  If test is done without insurance authorization, patient may be responsible for the entire amount billed.      Precertification and Prior Authorizations:  If your physician has recommended that you have a procedure or additional testing performed the [facility]  Centralized Referral Team will contact your insurance carrier to obtain pre-certification or prior authorization.    You are strongly encouraged to contact your insurance carrier to verify that your procedure/test has been approved and is a COVERED benefit.  Although the [facility] Centralized Referral Team does its due diligence, the insurance carrier gives the disclaimer that \"Although the procedure is authorized, this does not guarantee payment.\"    Ultimately the patient is responsible for payment.   Thank you for your understanding in this matter.  Paperwork Completion:  If you require FMLA or disability paperwork for your recovery, please make sure to either drop it off or have it faxed to our office at [phone]. Be sure the form has your name and date of birth on it.  The form will be faxed to our Forms Department and they will complete it for you.  There is a 25$ fee for all forms that need to be filled out.  Please be aware there is a 10-14 day turnaround time.  You will need to sign a release of information (MICAELA) form if your paperwork does not come with one.  You may call the Forms Department with any questions at [phone].  Their fax number is [phone].

## 2025-02-04 ENCOUNTER — TELEPHONE (OUTPATIENT)
Dept: NEUROLOGY | Facility: CLINIC | Age: 60
End: 2025-02-04

## 2025-02-05 ENCOUNTER — TELEPHONE (OUTPATIENT)
Dept: NEUROLOGY | Facility: CLINIC | Age: 60
End: 2025-02-05

## 2025-02-05 NOTE — TELEPHONE ENCOUNTER
I discussed this with the patient, he is going to stop the sertraline so that he can take the amitriptyline instead
Phone call returned to pharmacy, spoke to [facility], pharmacy tech. Advised of Dr. Rivas's message.   
[facility] [employer] Regarding Amitriptyline 10 mg tablets.   Message advising   Effects of Tricyclic Antidepressants may be increase by sertraline. Additive serotonergic effects may also occur during coadministration of these agent, and the risk of developing serotonin syndrome may be increased.  Call to clear.   
no fever and no chills.

## 2025-02-10 DIAGNOSIS — R51.9 PERSISTENT HEADACHES: ICD-10-CM

## 2025-02-10 DIAGNOSIS — R41.89 DIFFICULTY PROCESSING INFORMATION: ICD-10-CM

## 2025-02-10 DIAGNOSIS — S06.5XAA SUBDURAL HEMATOMA (HCC): ICD-10-CM

## 2025-02-10 DIAGNOSIS — R42 DIZZINESS: ICD-10-CM

## 2025-02-10 DIAGNOSIS — S06.0XAD CONCUSSION WITH UNKNOWN LOSS OF CONSCIOUSNESS STATUS, SUBSEQUENT ENCOUNTER: ICD-10-CM

## 2025-02-11 RX ORDER — BUTALBITAL, ACETAMINOPHEN AND CAFFEINE 50; 325; 40 MG/1; MG/1; MG/1
1 TABLET ORAL EVERY 4 HOURS PRN
Qty: 60 TABLET | Refills: 0 | Status: SHIPPED | OUTPATIENT
Start: 2025-02-11 | End: 2025-04-15

## 2025-02-17 RX ORDER — SERTRALINE HYDROCHLORIDE 25 MG/1
25 TABLET, FILM COATED ORAL DAILY
Qty: 90 TABLET | Refills: 1 | Status: SHIPPED | OUTPATIENT
Start: 2025-02-17

## 2025-02-17 NOTE — TELEPHONE ENCOUNTER
Message noted: Chart reviewed and may refill medication as requested. Prescription sent to listed pharmacy. Pharmacy to notify patient. Pt notified through [place]

## 2025-02-25 ENCOUNTER — HOSPITAL ENCOUNTER (OUTPATIENT)
Dept: GENERAL RADIOLOGY | Facility: HOSPITAL | Age: 60
Discharge: HOME | End: 2025-02-25
Attending: PHYSICIAN ASSISTANT
Payer: COMMERCIAL

## 2025-02-25 ENCOUNTER — OFFICE VISIT (OUTPATIENT)
Age: 60
End: 2025-02-25
Payer: COMMERCIAL
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
## 2025-02-25 DIAGNOSIS — M48.02 CERVICAL STENOSIS OF SPINAL CANAL: ICD-10-CM

## 2025-02-25 DIAGNOSIS — M47.22 CERVICAL SPONDYLOSIS WITH RADICULOPATHY: ICD-10-CM

## 2025-02-25 DIAGNOSIS — M54.2 NECK PAIN: ICD-10-CM

## 2025-02-25 DIAGNOSIS — M54.2 NECK PAIN: Primary | ICD-10-CM

## 2025-02-25 DIAGNOSIS — Z98.1 S/P CERVICAL SPINAL FUSION: ICD-10-CM

## 2025-02-25 PROCEDURE — 72050 X-RAY EXAM NECK SPINE 4/5VWS: CPT | Performed by: PHYSICIAN ASSISTANT

## 2025-02-25 PROCEDURE — 99213 OFFICE O/P EST LOW 20 MIN: CPT | Performed by: PHYSICIAN ASSISTANT

## 2025-02-25 NOTE — PROGRESS NOTES
Patient: Andre Howell  Medical Record Number: [id number]  Site: [facility]  Referring Physician:  No ref. provider found  PCP: Aubrey Sheehan MD       HISTORY OF CHIEF COMPLAINT:    Andre Howell is a 59 year old male, who complains of 6-week h/o left side radiating neck pain.  Denies dropping objects or difficulty walking.  Patient has neck pain that shoots down to his left shoulder.  This started after he slipped on ice and hit the back of his head.  He was admitted to [facility] for a subdural hematoma.  He has been following up with neurosurgery since then.  He states the neck pain to his left shoulder has been present since the fall.  He has no numbness and tingling.  No weakness in the arms.  No radicular arm pain.  He does have a headache still and his neurosurgeon knows this.  Patient thinks the headache may be a tension headache.  He had similar symptoms to be prior to his C4-6 fusion by Dr. Long several years ago.  He states that his left trapezius feels very tight.  He starts physical therapy next week.    VAS Pain Score: 4 /10        Past Medical History:    Back pain    Back problem    Calculus of kidney    Cholelithiasis    Colon adenoma    x1    Esophageal reflux    Hearing impairment    High blood pressure    High cholesterol    Neuropathy    Right foot drop, and right leg weakness    Problems with swallowing    R/T fusion, liquids and solids occasionally    Screen for colon cancer    Repeat in 9/2021 due to family hx; no adenomatous polyps in 2016    Sleep apnea    Unspecified essential hypertension    Visual impairment      Past Surgical History:   Procedure Laterality Date    Back surgery  2016    hardware removed    Back surgery  2015    Shabbir put in L3 L4    Back surgery  04/2017    C4-5-6 fusion    Cholecystectomy      Colonoscopy  2017    Colonoscopy screening - referral N/A 02/12/2024    Procedure: COLONOSCOPY-SCREENING;  Surgeon: Yuri Canas MD;  Location: [facility]  ENDOSCOPY    Egd  02/12/2024    Electrocardiogram, complete  07/19/2012    scanned to media tab, 07/19/2012    Inj paravert f jnt l/s 1 lev Right 09/15/2014    Procedure: FACET INJECTION UNDER FLUOROSCOPY;  Surgeon: Gustavo Gilliam DO;  Location: [facility]    M-sedaj by long phys perfrmg svc 5+ yr Right 09/15/2014    Procedure: FACET INJECTION UNDER FLUOROSCOPY;  Surgeon: Gustavo Gilliam DO;  Location: [facility]    Surgery - external Left 12/07/2018    Rotator cuff surgery Left by [facility]      Family History   Problem Relation Age of Onset    Hypertension Mother     Thyroid disease Mother     Cancer Mother         kidney    Colon Polyps Mother     Hypertension Father     Heart Disease Father     Hypertension Brother     Cancer Maternal Grandfather         colon    Colon Cancer Maternal Grandfather     Depression Other       Social History     Socioeconomic History    Marital status:    Tobacco Use    Smoking status: Never    Smokeless tobacco: Never   Vaping Use    Vaping status: Never Used   Substance and Sexual Activity    Alcohol use: Not Currently     Comment: occasionally    Drug use: Not Currently     Frequency: 7.0 times per week     Types: Cannabis     Comment: h/o \"medical marijuanna\" - last use 3 years ago   Other Topics Concern    Caffeine Concern Yes     Comment: soda    Grew up on a farm No    History of tanning Yes    Outdoor occupation No    Reaction to local anesthetic No    Pt has a pacemaker No    Pt has a defibrillator No      Current Medications:  Current Outpatient Medications   Medication Sig Dispense Refill    sertraline (ZOLOFT) 25 MG Oral Tab Take 1 tablet (25 mg total) by mouth daily. 90 tablet 1    topiramate 25 MG Oral Tab Take 1 tablet (25 mg total) by mouth 2 (two) times daily. 180 tablet 1    butalbital-acetaminophen-caffeine -40 MG Oral Tab Take 1 tablet by mouth every 4 (four) hours as needed for Headaches. 60  tablet 0    cyclobenzaprine 10 MG Oral Tab Take 1 tablet (10 mg total) by mouth nightly. As needed 15 tablet 0    aspirin 81 MG Oral Tab EC Take 1 tablet (81 mg total) by mouth daily. 30 tablet 0    fluticasone propionate 50 MCG/ACT Nasal Suspension 2 sprays by Each Nare route daily.      Multiple Vitamin (MULTI-VITAMIN DAILY) Oral Tab Take 1 tablet by mouth daily.      VALSARTAN-HYDROCHLOROTHIAZIDE 160-12.5 MG Oral Tab TAKE 1 TABLET BY MOUTH DAILY 90 tablet 3    carvedilol 12.5 MG Oral Tab Take 1 tablet (12.5 mg total) by mouth 2 (two) times daily.      AZELASTINE  MCG/SPRAY Nasal Solution SPRAY 2 SPRAYS BY NASAL ROUTE DAILY 30 mL 2    amitriptyline 10 MG Oral Tab Take 1 tablet (10 mg total) by mouth nightly. 90 tablet 3    HYDROcodone-acetaminophen 5-325 MG Oral Tab Take 1 tablet by mouth every 6 (six) hours as needed for Pain. (Patient not taking: Reported on 2/25/2025) 30 tablet 0      .        IMAGING STUDIES:  X-rays were reviewed with the patient today  X-rays  Images were reviewed and discussed with the patient.  They voiced understanding of what the images showed.  EXAM:  X-RAY OF CERVICAL SPINE POST OP     CLINICAL INFORMATION:  Patient is Post Op cervical fusion.     FINDINGS:    AP and Lateral views of cervical spine were completed.   Instrumentation seen from  C4-6-    No signs of hardware failure or osteolysis.   No fractures seen.  C3-4 and C6-7 spondylosis seen.  Adequate disc space noted throughout remaining levels     IMPRESSION:    Post op x-ray of cervical spine.  See body of report for details.           PHYSICAL EXAMIMATION   PHYSICAL EXAMINATION: Andre Howell is a 59 year old   male who is observed sitting comfortably in the exam room alert and oriented times three. RESPIRATORY RATE: 18 He looks consistent his stated age.    There were no vitals taken for this visit.  The patient is well developed, well nourished     Inspection: No acute distress.   Patient displays non-antalgic  gait, and is able to normal heel walk, normal toe walk.     Coordination: Well coordinated, Fluid gait    Cervical spine:  Patient is A&O X 3 and in no apparent distress.   Neck is soft and supple with no masses or lymphadenopathy palpated.  Mild left sided tender to palpation over the para spinals.  No tenderness to the spinous process  Skin intact with no erythema, edema or ecchymosis noted  pain with left and right rotation of neck  Spulrlings Maneuver is Negative  Saab's Maneuver is negative bilaterally  Smooth pain free ROM to bilateral wrist, elbow; no shoulder pain  Sensation is intact to all dermatome patterns of bilateral upper extremities  Tinel's is negative to bilateral cubital and carpal tunnels. negative Phalen's    Strength testing:   Left   Right   5/5 Deltoid  5/5 Deltoid    5/5 Biceps  5/5 Biceps   5/5 Triceps  5/5 Triceps    5/5 Ext Rot  5/5 Ext Rot   5/5 Wrist Ext  5/5 Wrist Ext   4/5 Intrinsics  5/5 Intrinsics    DTR  Left   Right   2+Biceps  2+ Biceps   2+Triceps  2+Triceps   2+Brichoradialis  2+Brachioradialis    Bilateral Lower Extremitis are negative for clonus                                                                                       MEDICAL DECISION MAKING:   Impression: Cervical spondylosis with radiculopathy  Left hand weakness  S/p fall    Plan: We discussed the diagnosis and treatment options today, including rationale for surgical vs non-surgical options.  The patient has had radicular symptoms since a fall 6 weeks ago.  I will order an MRI at this time to evaluate his stenosis.  He is starting physical therapy next week and I will message his physical therapist about his cervical spine spondylosis.  We will follow up with him after his MRI is complete.  We did review his x-rays together today.  His concerns were addressed.  Restrictions and limitations were reviewed with the patient.  Concerns were addressed.  Questions were encouraged and answered.  Patient voiced  understanding.  Images were reviewed and discussed with the patient.  They voiced understanding of what the images showed.        The patient indicates understanding of these issues and agrees to the plan.    Thank you very much.     Respectfully yours,    Roberto Aldana PA-C    This note was dictated using Dragon software. While it was briefly proofread prior to completion, some grammatical, spelling, and word choice errors due to dictation may still occur.

## 2025-03-01 ENCOUNTER — HOSPITAL ENCOUNTER (OUTPATIENT)
Dept: CT IMAGING | Age: 60
Discharge: HOME | End: 2025-03-01
Attending: STUDENT IN AN ORGANIZED HEALTH CARE EDUCATION/TRAINING PROGRAM
Payer: COMMERCIAL

## 2025-03-01 DIAGNOSIS — S06.0XAD CONCUSSION WITH UNKNOWN LOSS OF CONSCIOUSNESS STATUS, SUBSEQUENT ENCOUNTER: ICD-10-CM

## 2025-03-01 DIAGNOSIS — R51.9 PERSISTENT HEADACHES: ICD-10-CM

## 2025-03-01 DIAGNOSIS — R41.89 DIFFICULTY PROCESSING INFORMATION: ICD-10-CM

## 2025-03-01 DIAGNOSIS — R42 DIZZINESS: ICD-10-CM

## 2025-03-01 DIAGNOSIS — S06.5XAA SUBDURAL HEMATOMA (HCC): ICD-10-CM

## 2025-03-01 PROCEDURE — 70450 CT HEAD/BRAIN W/O DYE: CPT | Performed by: STUDENT IN AN ORGANIZED HEALTH CARE EDUCATION/TRAINING PROGRAM

## 2025-03-04 ENCOUNTER — TELEPHONE (OUTPATIENT)
Dept: PHYSICAL THERAPY | Facility: HOSPITAL | Age: 60
End: 2025-03-04

## 2025-03-05 ENCOUNTER — PATIENT MESSAGE (OUTPATIENT)
Dept: SURGERY | Facility: CLINIC | Age: 60
End: 2025-03-05

## 2025-03-05 NOTE — TELEPHONE ENCOUNTER
Patient MyChart message noted. Patient is asking if CT head done 3/1 has been read yet by radiology. I replied to the patient directly letting him know that the report is not yet completed but that I would forward a message to Mikel to let him know that the report is pending and should be back soon.     Message forwarded to provider.

## 2025-03-06 NOTE — TELEPHONE ENCOUNTER
I spoke with the patient directly.  Reviewed imaging.  Hemorrhage has resolved.  Patient is cleared to resume his baby aspirin.  No further neurosurgical follow-up needed.  Patient states that he has intermittent headaches, which he feels is due to tension in his neck.  He recently saw an orthopedic provider for this and has started physical therapy.  He otherwise has no new neurologic complaints at this time.  I encouraged him to reach out with any questions or concerns.

## 2025-03-06 NOTE — TELEPHONE ENCOUNTER
Message below noted.    Provider discussed with patient directly. Patient to reach back out with any other questions or concerns.    Nothing needed further with this encounter.

## 2025-03-07 ENCOUNTER — OFFICE VISIT (OUTPATIENT)
Dept: PHYSICAL THERAPY | Age: 60
End: 2025-03-07
Attending: INTERNAL MEDICINE
Payer: COMMERCIAL

## 2025-03-07 PROCEDURE — 97140 MANUAL THERAPY 1/> REGIONS: CPT | Performed by: PHYSICAL THERAPIST

## 2025-03-07 PROCEDURE — 97112 NEUROMUSCULAR REEDUCATION: CPT | Performed by: PHYSICAL THERAPIST

## 2025-03-07 PROCEDURE — 97110 THERAPEUTIC EXERCISES: CPT | Performed by: PHYSICAL THERAPIST

## 2025-03-07 PROCEDURE — 97116 GAIT TRAINING THERAPY: CPT | Performed by: PHYSICAL THERAPIST

## 2025-03-07 NOTE — PROGRESS NOTES
Patient: Andre Howell (59 year old, male) Referring Provider:  Insurance:   Diagnosis: Post concussion syndrome (F07.81) Cervical stenosis of spinal canal (M48.02)  S/P cervical spinal fusion (Z98.1)  Cervical spondylosis with radiculopathy (M47.22) Jerri Rivas  [facility]   Date of Surgery: No data recorded Next MD visit:  N/A   Precautions:    No data recorded Referral Information:    Date of Evaluation: Req: 8, Auth: 8, Exp: 6/4/2025 03/05/25 POC Auth Visits:          Today's Date   3/7/2025    Subjective  Pt reports that he is feeling better. Pt reports that he got results of the CT scan and the bleed is gone and everything is looking good. Pt reports that the 2 exercises have helped the neck pain. Pt reports that he is tappering off the meds which is helping with the dizziness. Pt reports that since doing the HEP his headaches are almost gone.       Pain: 3/10     Objective       Sharp gato-negative  Functional Gait Assessment   Item Description Score (0 worst, 3 best)    ___2____1. Gait Level Surface-  Time: _____7.88_____seconds   3) Normal--Walks 6 m (20 ft) in less than 5.5 seconds, no assistive devices, good speed, no evidence for imbalance, normal gait pattern, deviates no more than 15.24 cm (6 in) outside of the  30.48-cm (12-in) walkway width. (2) Mild impairment--Walks 6 m (20 ft) in less than 7 seconds but  greater than 5.5 seconds, uses assistive device, slower speed, mild gait deviations, or deviates 15.24-25.4 cm (6-10 in) outside of the 30.48-cm (12-in) walkway width. (1) Moderate impairment--Walks 6 m (20 ft), slow speed, abnormal gait pattern, evidence for imbalance, or deviates 25.4-38.1 cm (10-15 in) outside of the 30.48-cm (12-in) walkway width. Requires more than 7 seconds to ambulate 6 m (20 ft). (0) Severe impairment--Cannot walk 6 m (20 ft) without assistance, severe gait deviations or imbalance, deviates greater than 38.1  cm (15 in) outside of the 30.48-cm  ___3____2. Change  in gait speed 3) Normal--Able to smoothly change walking speed without loss of balance or gait deviation. Shows a significant difference in walking speeds between normal, fast, and slow speeds. Deviates no more than 15.24 cm (6 in) outside of the 30.48-cm(12-in) walkway width.  (2) Mild impairment--Is able to change speed but demonstrates mild gait deviations, deviates 15.24-25.4 cm (6-10 in) outside of the 30.48-cm (12-in) walkway width, or no gait deviations but unable to achieve a significant change in velocity, or uses an assistive device. (1) Moderate impairment--Makes only minor adjustments to walking speed, or accomplishes a change in speed with significant gait deviations, deviates 25.4-38.1 cm (10-15 in) outside the 30.48-cm (12-in) walkway width, or changes speed but loses  balance but is able to recover and continue walking. (0) Severe impairment--Cannot change speeds, deviates greater than 38.1 cm (15 in) outside 30.48-cm (12-in) walkway width, or loses balance and has to reach for wall or be caught.  ___2____3. Gait with horizontal head turns  (3) Normal--Performs head turns smoothly with no change in gait.Deviates no more than 15.24 cm (6 in) outside 30.48-cm (12-in)walkway width. (2) Mild impairment--Performs head turns smoothly with slight change in gait velocity (eg, minor disruption to smooth gait path), deviates 15.24-25.4 cm (6-10 in) outside 30.48-cm (12-in) walkway width, or uses an assistive device.(1) Moderate impairment--Performs head turns with moderate change in gait velocity, slows down, deviates 25.4-38.1 cm (10-15 in) outside 30.48-cm (12-in) walkway width but recovers,  can continue to walk. (0) Severe impairment--Performs task with severe disruption of gait (eg, staggers 38.1 cm [15 in] outside 30.48-cm (12-in) walkway width, loses balance, stops, or reaches for wall).  ___3____4. Gait with vertical head turns 3) Normal--Performs head turns with no change in gait. Deviates no more  than 15.24 cm (6 in) outside 30.48-cm (12-in) walkway width. (2) Mild impairment--Performs task with slight change in gait velocity (eg, minor disruption to smooth gait path), deviates 15.24-25.4 cm (6-10 in) outside 30.48-cm (12-in) walkway width or uses assistive device.  (1) Moderate impairment--Performs task with moderate change in gait velocity, slows down, deviates 25.4-38.1 cm (10-15 in) outside 30.48-cm (12-in) walkway width but recovers, can continue to walk.  (0) Severe impairment--Performs task with severe disruption of gait (eg, staggers 38.1 cm [15 in] outside 30.48-cm (12-in) walkway width, loses balance, stops, reaches for wall).  ___3____5. Gait and pivot turn (3) Normal--Pivot turns safely within 3 seconds and stops quicklywith no loss of balance. (2) Mild impairment--Pivot turns safely in 3 seconds and stops with no loss of balance, or pivot turns safely within 3 seconds and stops with mild imbalance, requires small steps to catch Balance. (1) Moderate impairment--Turns slowly, requires verbal cueing, or requires several small steps to catch balance following turn and stop. (0) Severe impairment--Cannot turn safely, requires assistance to turn and stop.  ___3____6. Step over obstacle (3) Normal--Is able to step over 2 stacked shoe boxes taped together (22.86 cm [9 in] total height) without changing gait speed; no evidence of imbalance.  (2) Mild impairment--Is able to step over one shoe box (11.43 cm [4.5 in] total height) without changing gait speed; no evidence of imbalance. (1) Moderate impairment--Is able to step over one shoe box (11.43  cm [4.5 in] total height) but must slow down and adjust steps to clear box safely. May require verbal cueing.(0) Severe impairment--Cannot perform without assistance.  ___3____7. Gait with narrow base of support-  # of steps___10_____ (3) Normal--Is able to ambulate for 10 steps heel to toe with no staggering. (2) Mild impairment--Ambulates 7-9 steps.  (1)  Moderate impairment--Ambulates 4-7 steps. (0) Severe impairment--Ambulates less than 4 steps heel to toe orcannot perform without assistance.  ___0____8. Gait with eyes closed-  Time: __0__seconds (3) Normal--Walks 6 m (20 ft), no assistive devices, good speed, no evidence of imbalance, normal gait pattern, deviates no more than 15.24 cm (6 in) outside 30.48-cm (12-in) walkway width. Ambulates 6 m (20 ft) in less than 7 seconds.  (2) Mild impairment--Walks 6 m (20 ft), uses assistive device, slower speed, mild gait deviations, deviates 15.24-25.4 cm (6-10 in) outside 30.48-cm (12-in) walkway width. Ambulates 6 m (20 ft) in less than 9 seconds but greater than 7 seconds. (1) Moderate impairment--Walks 6 m (20 ft), slow speed, abnormal  gait pattern, evidence for imbalance, deviates 25.4-38.1 cm (10-15 in) outside 30.48-cm (12-in) walkway width. Requires more than 9 seconds to ambulate 6 m (20 ft). (0) Severe impairment--Cannot walk 6 m (20 ft) without assistance, severe gait deviations or imbalance, deviates greater than 38.1 cm (15 in) outside 30.48-cm (12-in) walkway width or will not attempt task.  ___3_9. Ambulating backward (3) Normal--Walks 6 m (20 ft), no assistive devices, good peed,  no evidence for imbalance, normal gait pattern, deviates no more than 15.24 cm (6 in) outside 30.48-cm (12-in) walkway width. (2) Mild impairment--Walks 6 m (20 ft), uses assistive device, slower speed, mild gait deviations, deviates 15.24-25.4 cm (6-10 in) outside 30.48-cm (12-in) walkway width. (1) Moderate impairment--Walks 6 m (20 ft), slow speed, abnormal gait pattern, evidence for imbalance, deviates 25.4-38.1 cm (10-15 in) outside 30.48-cm (12-in) walkway width. (0) Severe impairment--Cannot walk 6 m (20 ft) without assistance, severe gait deviations or imbalance, deviates greater than 38.1 cm (15 in) outside 30.48-cm (12-in) walkway width or will not attempt task.  ___3____10. Steps (3) Normal--Alternating feet, no rail.  (2) Mild impairment--Alternating feet, must use rail. (1) Moderate impairment--Two feet to a stair; must use rail. (0) Severe impairment--Cannot do safely.    TOTAL SCORE: __25_____/30    (less than 22/30 = fall risk)            Assessment  Administered the FGA and pt is not a fall risk    Goals (to be met in 20 visits)   1. Pt to be independent in their home exercise program, its’ progression, and management of their symptoms.  2. Pt to demonstrate and be able to verbalize strategies to aid static postural control so pt can drive > 20 minutes with < 2/10 pain.  3.Pt to report ability to sleep >5 hours for 3 consecutive nights with reported pain 0/10.   4.Pt to demonstrate WFL C-spine AROM in order to turn head to drive  5. Pt to increase B UE strength to 4+/5 or better in order to put away items in cabinets  6. Pt to report 50% or more decrease in headache symptoms throughout the day  7. Pt to report no dizziness symptoms to function in the community         Plan  Cont PT per plan of care    Treatment Last 4 Visits       3/7/2025   PT Treatment   Treatment Day 2          3/5/2025 3/7/2025   Concussion Treatment   Treatment Day  2   Therapeutic Exercise Trap stretch gentle 30sec x3 R/L  Levator scap stretch 30sec x3 R/L  Seated trap stretch 30sec x3 R/L  Seated levator scap stretch 30sec x3   Neuro Re-Education  YTB Row 5sec x20  YTB ext 5sec x20  Doorway stretch 10sec x10   Manual Therapy STM to B traps  STM to B traps    Gait Training  FGA see objective   Therapeutic Exercise Minutes 8 10   Neuro Re-Educ Minutes  10   Manual Therapy Minutes 10 10   Gait Training Minutes  10   Total Time Of Timed Procedures 18 40   Total Time Of Service-Based Procedures 0 0   Total Treatment Time 18 40   HEP Trap and levator scap stretch (verbal)         HEP  Trap and levator scap stretch (verbal)    Charges  1 gait, 1 therex, 1 neuro, 1 man

## 2025-03-12 ENCOUNTER — OFFICE VISIT (OUTPATIENT)
Dept: PHYSICAL THERAPY | Age: 60
End: 2025-03-12
Attending: INTERNAL MEDICINE
Payer: COMMERCIAL

## 2025-03-12 PROCEDURE — 97112 NEUROMUSCULAR REEDUCATION: CPT | Performed by: PHYSICAL THERAPIST

## 2025-03-12 PROCEDURE — 97110 THERAPEUTIC EXERCISES: CPT | Performed by: PHYSICAL THERAPIST

## 2025-03-12 PROCEDURE — 97140 MANUAL THERAPY 1/> REGIONS: CPT | Performed by: PHYSICAL THERAPIST

## 2025-03-12 NOTE — PROGRESS NOTES
Patient: Andre Howell (59 year old, male) Referring Provider:  Insurance:   Diagnosis: Post concussion syndrome (F07.81) Cervical stenosis of spinal canal (M48.02)  S/P cervical spinal fusion (Z98.1)  Cervical spondylosis with radiculopathy (M47.22) Jerri Rivas  [facility]   Date of Surgery: No data recorded Next MD visit:  N/A   Precautions:    No data recorded Referral Information:    Date of Evaluation: Req: 8, Auth: 8, Exp: 6/4/2025 03/05/25 POC Auth Visits:          Today's Date   3/12/2025    Subjective  Pt reports that his neck almost feels like normal tightness. Pt reports that he did a painting project this weekend and it went well. Pt reports that he really hasn't had much of a headache. Pt reports dizziness and balance is getting better. Pt reports that today he feels 90% back to normal.       Pain: 0/10     Objective  see below            Assessment  Continued with stretching and strengthening exercises.    Goals (to be met in 20 visits)   1. Pt to be independent in their home exercise program, its’ progression, and management of their symptoms.  2. Pt to demonstrate and be able to verbalize strategies to aid static postural control so pt can drive > 20 minutes with < 2/10 pain.  3.Pt to report ability to sleep >5 hours for 3 consecutive nights with reported pain 0/10.   4.Pt to demonstrate WFL C-spine AROM in order to turn head to drive  5. Pt to increase B UE strength to 4+/5 or better in order to put away items in cabinets  6. Pt to report 50% or more decrease in headache symptoms throughout the day  7. Pt to report no dizziness symptoms to function in the community             Plan  Cont PT per plan of care.    Treatment Last 4 Visits       3/7/2025 3/12/2025   PT Treatment   Treatment Day 2 3          3/5/2025 3/7/2025 3/12/2025   Concussion Treatment   Treatment Day  2 3   Therapeutic Exercise Trap stretch gentle 30sec x3 R/L  Levator scap stretch 30sec x3 R/L  Seated trap stretch 30sec x3  R/L  Seated levator scap stretch 30sec x3 Sci fit UE L1 3f/3b  Seated trap stretch 30sec x3 R/L  Seated levator scap stretch 30sec x3   Neuro Re-Education  YTB Row 5sec x20  YTB ext 5sec x20  Doorway stretch 10sec x10 YTB Row 5sec x20  YTB ext 5sec x20  Doorway stretch 10sec x10   Manual Therapy STM to B traps  STM to B traps  STM to B traps    Gait Training  FGA see objective    Therapeutic Exercise Minutes 8 10 25   Neuro Re-Educ Minutes  10 8   Manual Therapy Minutes 10 10 10   Gait Training Minutes  10    Total Time Of Timed Procedures 18 40 43   Total Time Of Service-Based Procedures 0 0 0   Total Treatment Time 18 40 43   HEP Trap and levator scap stretch (verbal)          HEP  Trap and levator scap stretch (verbal)    Charges  2 therex, 1 neuro, 1 man

## 2025-03-14 ENCOUNTER — OFFICE VISIT (OUTPATIENT)
Dept: PHYSICAL THERAPY | Age: 60
End: 2025-03-14
Attending: INTERNAL MEDICINE
Payer: COMMERCIAL

## 2025-03-14 PROCEDURE — 97140 MANUAL THERAPY 1/> REGIONS: CPT | Performed by: PHYSICAL THERAPIST

## 2025-03-14 PROCEDURE — 97112 NEUROMUSCULAR REEDUCATION: CPT | Performed by: PHYSICAL THERAPIST

## 2025-03-14 PROCEDURE — 97110 THERAPEUTIC EXERCISES: CPT | Performed by: PHYSICAL THERAPIST

## 2025-03-14 NOTE — PROGRESS NOTES
Patient: Andre Howell (59 year old, male) Referring Provider:  Insurance:   Diagnosis: Post concussion syndrome (F07.81) Cervical stenosis of spinal canal (M48.02)  S/P cervical spinal fusion (Z98.1)  Cervical spondylosis with radiculopathy (M47.22) [facility]   Date of Surgery: No data recorded Next MD visit:  N/A   Precautions:    No data recorded Referral Information:    Date of Evaluation: Req: 8, Auth: 8, Exp: 6/4/2025 03/05/25 POC Auth Visits:          Today's Date   3/14/2025    Subjective  Pt reports that he stayed up light last night.       Pain:       Objective  SEE BELOW          Assessment  Added strengthening exercises    Goals (to be met in 20 visits)   1. Pt to be independent in their home exercise program, its’ progression, and management of their symptoms.  2. Pt to demonstrate and be able to verbalize strategies to aid static postural control so pt can drive > 20 minutes with < 2/10 pain.  3.Pt to report ability to sleep >5 hours for 3 consecutive nights with reported pain 0/10.   4.Pt to demonstrate WFL C-spine AROM in order to turn head to drive  5. Pt to increase B UE strength to 4+/5 or better in order to put away items in cabinets  6. Pt to report 50% or more decrease in headache symptoms throughout the day  7. Pt to report no dizziness symptoms to function in the community                 Plan  Cont PT per plan of care    Treatment Last 4 Visits       3/7/2025 3/12/2025 3/14/2025   PT Treatment   Treatment Day 2 3 4          3/5/2025 3/7/2025 3/12/2025 3/14/2025   Concussion Treatment   Treatment Day  2 3 4   Therapeutic Exercise Trap stretch gentle 30sec x3 R/L  Levator scap stretch 30sec x3 R/L  Seated trap stretch 30sec x3 R/L  Seated levator scap stretch 30sec x3 Sci fit UE L1 3f/3b  Seated trap stretch 30sec x3 R/L  Seated levator scap stretch 30sec x3 Sci fit UE L1 3f/3b  Wall push ups x20  Standing sh flex B 2x10  Standing sh abd B 2x10   Neuro Re-Education  TERRA  Row 5sec x20  YTB ext 5sec x20  Doorway stretch 10sec x10 YTB Row 5sec x20  YTB ext 5sec x20  Doorway stretch 10sec x10 YTB Row 5sec x20  YTB ext 5sec x20  Doorway stretch 10sec x10   Manual Therapy STM to B traps  STM to B traps  STM to B traps  STM to B traps    Gait Training  FGA see objective     Therapeutic Exercise Minutes 8 10 25 25   Neuro Re-Educ Minutes  10 8 8   Manual Therapy Minutes 10 10 10 10   Gait Training Minutes  10     Total Time Of Timed Procedures 18 40 43 43   Total Time Of Service-Based Procedures 0 0 0 0   Total Treatment Time 18 40 43 43   HEP Trap and levator scap stretch (verbal)           HEP  Trap and levator scap stretch (verbal)    Charges

## 2025-03-19 ENCOUNTER — OFFICE VISIT (OUTPATIENT)
Dept: PHYSICAL THERAPY | Age: 60
End: 2025-03-19
Attending: INTERNAL MEDICINE
Payer: COMMERCIAL

## 2025-03-19 PROCEDURE — 97110 THERAPEUTIC EXERCISES: CPT | Performed by: PHYSICAL THERAPIST

## 2025-03-19 PROCEDURE — 97140 MANUAL THERAPY 1/> REGIONS: CPT | Performed by: PHYSICAL THERAPIST

## 2025-03-19 PROCEDURE — 97112 NEUROMUSCULAR REEDUCATION: CPT | Performed by: PHYSICAL THERAPIST

## 2025-03-19 NOTE — PROGRESS NOTES
Patient: Andre Howell (59 year old, male) Referring Provider:  Insurance:   Diagnosis: Post concussion syndrome (F07.81) Cervical stenosis of spinal canal (M48.02)  S/P cervical spinal fusion (Z98.1)  Cervical spondylosis with radiculopathy (M47.22) Jerri Rivas  [facility]   Date of Surgery: No data recorded Next MD visit:  N/A   Precautions:    No data recorded Referral Information:    Date of Evaluation: Req: 8, Auth: 8, Exp: 6/4/2025 03/05/25 POC Auth Visits:          Today's Date   3/19/2025    Subjective  Pt was seen 15 min early for today's visit. Pt reports that he is feelik ok       Pain: 0/10     Objective  see below            Assessment  added weights to exexercises    Goals (to be met in 20 visits)   1. Pt to be independent in their home exercise program, its’ progression, and management of their symptoms.  2. Pt to demonstrate and be able to verbalize strategies to aid static postural control so pt can drive > 20 minutes with < 2/10 pain.  3.Pt to report ability to sleep >5 hours for 3 consecutive nights with reported pain 0/10.   4.Pt to demonstrate WFL C-spine AROM in order to turn head to drive  5. Pt to increase B UE strength to 4+/5 or better in order to put away items in cabinets  6. Pt to report 50% or more decrease in headache symptoms throughout the day  7. Pt to report no dizziness symptoms to function in the community                     Plan  Cont PT for remaining scheduled visits    Treatment Last 4 Visits       3/7/2025 3/12/2025 3/14/2025 3/19/2025   PT Treatment   Treatment Day 2 3 4 5          3/7/2025 3/12/2025 3/14/2025 3/19/2025   Concussion Treatment   Treatment Day 2 3 4 5   Therapeutic Exercise Seated trap stretch 30sec x3 R/L  Seated levator scap stretch 30sec x3 Sci fit UE L1 3f/3b  Seated trap stretch 30sec x3 R/L  Seated levator scap stretch 30sec x3 Sci fit UE L1 3f/3b  Wall push ups x20  Standing sh flex B 2x10  Standing sh abd B 2x10 Sci fit UE L1 3f/3b  Wall push  ups x20  Standing sh flex B 2# 2x10  Standing sh abd B 2# 2x10   Neuro Re-Education YTB Row 5sec x20  YTB ext 5sec x20  Doorway stretch 10sec x10 YTB Row 5sec x20  YTB ext 5sec x20  Doorway stretch 10sec x10 YTB Row 5sec x20  YTB ext 5sec x20  Doorway stretch 10sec x10 YTB Row 5sec x20  YTB ext 5sec x20  Doorway stretch 10sec x10   Manual Therapy STM to B traps  STM to B traps  STM to B traps  STM to B traps    Gait Training FGA see objective      Therapeutic Exercise Minutes 10 25 25 25   Neuro Re-Educ Minutes 10 8 8 8   Manual Therapy Minutes 10 10 10 10   Gait Training Minutes 10      Total Time Of Timed Procedures 40 43 43 43   Total Time Of Service-Based Procedures 0 0 0 0   Total Treatment Time 40 43 43 43        HEP  Trap and levator scap stretch (verbal)    Charges  2 therex, 1 neuro, 1 man

## 2025-03-21 ENCOUNTER — APPOINTMENT (OUTPATIENT)
Dept: PHYSICAL THERAPY | Age: 60
End: 2025-03-21
Attending: INTERNAL MEDICINE
Payer: COMMERCIAL

## 2025-03-25 ENCOUNTER — HOSPITAL ENCOUNTER (OUTPATIENT)
Dept: MRI IMAGING | Facility: HOSPITAL | Age: 60
Discharge: HOME | End: 2025-03-25
Attending: PHYSICIAN ASSISTANT
Payer: COMMERCIAL

## 2025-03-25 DIAGNOSIS — M48.02 CERVICAL STENOSIS OF SPINAL CANAL: ICD-10-CM

## 2025-03-25 DIAGNOSIS — M47.22 CERVICAL SPONDYLOSIS WITH RADICULOPATHY: ICD-10-CM
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
## 2025-03-25 DIAGNOSIS — Z98.1 S/P CERVICAL SPINAL FUSION: ICD-10-CM

## 2025-03-25 PROCEDURE — 72141 MRI NECK SPINE W/O DYE: CPT | Performed by: PHYSICIAN ASSISTANT

## 2025-03-27 ENCOUNTER — OFFICE VISIT (OUTPATIENT)
Dept: PHYSICAL THERAPY | Age: 60
End: 2025-03-27
Attending: INTERNAL MEDICINE
Payer: COMMERCIAL

## 2025-03-27 PROCEDURE — 97110 THERAPEUTIC EXERCISES: CPT | Performed by: PHYSICAL THERAPIST

## 2025-03-27 PROCEDURE — 97112 NEUROMUSCULAR REEDUCATION: CPT | Performed by: PHYSICAL THERAPIST

## 2025-03-27 NOTE — PROGRESS NOTES
Patient: Andre Howell (59 year old, male) Referring Provider:  Insurance:   Diagnosis: Post concussion syndrome (F07.81) Cervical stenosis of spinal canal (M48.02)  S/P cervical spinal fusion (Z98.1)  Cervical spondylosis with radiculopathy (M47.22) Jerri Rivas  [facility]   Date of Surgery: No data recorded Next MD visit:  N/A   Precautions:    No data recorded Referral Information:    Date of Evaluation: Req: 8, Auth: 8, Exp: 6/4/2025 03/05/25 POC Auth Visits:         Mary Ellen  Pt has attended 6 visits in Physical Therapy.   Reporting period: 3/5/25 to 3/27/2025       Today's Date   3/27/2025    Subjective  Pt reports that he is 100% better since beginning PT. Pt reports no more headaches and no more tension. Pt reports no longer feeling dizzy or off balance.       Pain: 0/10     Objective          C-spine AROM flex 100% ext 100% R Rot 100% L Rot 100% R % L %  SH flex B flex 5/5 B abd 5/5      Assessment  Pt demo's WFL C-spine AROM, good UE strength and no pain report. Pt has had no headaches or dizziness. Pt is independent with HEP and management of symptoms at this time. Pt is to be discharged from PT.    Goals (to be met in 20 visits)   1. Pt to be independent in their home exercise program, its’ progression, and management of their symptoms.-met  2. Pt to demonstrate and be able to verbalize strategies to aid static postural control so pt can drive > 20 minutes with < 2/10 pain.-met  3.Pt to report ability to sleep >5 hours for 3 consecutive nights with reported pain 0/10. -met  4.Pt to demonstrate WFL C-spine AROM in order to turn head to drive-met  5. Pt to increase B UE strength to 4+/5 or better in order to put away items in cabinets-met  6. Pt to report 50% or more decrease in headache symptoms throughout the day-met  7. Pt to report no dizziness symptoms to function in the community-met                  Neck Disability Index Score  Score: (Patient-Rptd) 42 % (2/26/2025  1:55  PM)    Post Neck Disability Index Score  Post Score: 0 % (3/27/2025 10:09 AM)    42 % improvement    Plan: Discharge from PT    Patient/Family/Caregiver  was advised of these findings, precautions, and treatment options and has agreed to actively participate in planning and for this course of care.    Thank you for your referral. If you have any questions, please contact me at Dept: [phone].    Sincerely,  Electronically signed by therapist Latosha Tsai PT, DPT, cert MDT    Physician's certification required:  No              Treatment Last 4 Visits  Treatment Day: 6       3/7/2025 3/12/2025 3/14/2025 3/19/2025   Concussion Treatment   Therapeutic Exercise Seated trap stretch 30sec x3 R/L  Seated levator scap stretch 30sec x3 Sci fit UE L1 3f/3b  Seated trap stretch 30sec x3 R/L  Seated levator scap stretch 30sec x3 Sci fit UE L1 3f/3b  Wall push ups x20  Standing sh flex B 2x10  Standing sh abd B 2x10 Sci fit UE L1 3f/3b  Wall push ups x20  Standing sh flex B 2# 2x10  Standing sh abd B 2# 2x10   Neuro Re-Education YTB Row 5sec x20  YTB ext 5sec x20  Doorway stretch 10sec x10 YTB Row 5sec x20  YTB ext 5sec x20  Doorway stretch 10sec x10 YTB Row 5sec x20  YTB ext 5sec x20  Doorway stretch 10sec x10 YTB Row 5sec x20  YTB ext 5sec x20  Doorway stretch 10sec x10   Manual Therapy STM to B traps  STM to B traps  STM to B traps  STM to B traps    Gait Training FGA see objective      Therapeutic Exercise Minutes 10 25 25 25   Neuro Re-Educ Minutes 10 8 8 8   Manual Therapy Minutes 10 10 10 10   Gait Training Minutes 10      Total Time Of Timed Procedures 40 43 43 43   Total Time Of Service-Based Procedures 0 0 0 0   Total Treatment Time 40 43 43 43        HEP  Trap and levator scap stretch (verbal)    Charges

## 2025-03-28 ENCOUNTER — PATIENT MESSAGE (OUTPATIENT)
Age: 60
End: 2025-03-28

## 2025-04-02 ENCOUNTER — TELEPHONE (OUTPATIENT)
Dept: ADMINISTRATIVE | Age: 60
End: 2025-04-02

## 2025-04-02 ENCOUNTER — TELEPHONE (OUTPATIENT)
Dept: FAMILY MEDICINE CLINIC | Facility: CLINIC | Age: 60
End: 2025-04-02

## 2025-04-02 DIAGNOSIS — Z09 FOLLOW-UP EXAMINATION: Primary | ICD-10-CM

## 2025-04-02 NOTE — TELEPHONE ENCOUNTER
Patient has an upcoming appointment in the Orthopedics Department with VIVIENNE Mcdowell and Dr. Analilia Abdul. They are currently in need of a referral, please send including multiple visits.    Future Appointments   Date Time Provider Department Center   4/4/2025 10:30 AM Roberto Aldana PA [facility]   4/15/2025  8:40 AM Ghode, Reena, MD [facility]        Thank you

## 2025-04-03 NOTE — TELEPHONE ENCOUNTER
Referral request noted. Referral approved, signed and sent to [facility].

## 2025-04-03 NOTE — TELEPHONE ENCOUNTER
Hello   Please review and if agreeable please sign off on request for MRI follow up appointment.   Thank you  Lulu

## 2025-04-03 NOTE — TELEPHONE ENCOUNTER
Referral request noted. Referral approved, signed and sent to [facility].

## 2025-04-03 NOTE — TELEPHONE ENCOUNTER
Hello  Please review the attached request and if the provider is agreeable sign off on.   Thank you   Lulu

## 2025-04-04 ENCOUNTER — OFFICE VISIT (OUTPATIENT)
Age: 60
End: 2025-04-04
Payer: COMMERCIAL

## 2025-04-04 DIAGNOSIS — M48.02 CERVICAL STENOSIS OF SPINAL CANAL: Primary | ICD-10-CM

## 2025-04-04 PROCEDURE — 99213 OFFICE O/P EST LOW 20 MIN: CPT | Performed by: PHYSICIAN ASSISTANT

## 2025-04-04 NOTE — PROGRESS NOTES
Patient: Andre Howell  Medical Record Number: [id number]  Site: [facility]  Referring Physician:  No ref. provider found  PCP: Aubrey Sheehan MD       HISTORY OF CHIEF COMPLAINT:    Andre Howell is a 59 year old male, who complains of 6-week h/o left side radiating neck pain.  Denies dropping objects or difficulty walking.  Patient has neck pain that shoots down to his left shoulder.  This started after he slipped on ice and hit the back of his head.  He was admitted to [facility] for a subdural hematoma.  He has been following up with neurosurgery since then.  He states the neck pain to his left shoulder has been present since the fall.  He has no numbness and tingling.  No weakness in the arms.  No radicular arm pain.  He does have a headache still and his neurosurgeon knows this.  Patient thinks the headache may be a tension headache.  He had similar symptoms to be prior to his C4-6 fusion by Dr. Long several years ago.  He states that his left trapezius feels very tight.  He starts physical therapy next week. No changes from last visit.     He does have a long history of right shoulder issues.  He sees Dr. Riley for this.  She has done surgery on him.  He states she is unsure why he is more weak with his right shoulder.     VAS Pain Score: 4 /10        Past Medical History:    Back pain    Back problem    Calculus of kidney    Cholelithiasis    Colon adenoma    x1    Esophageal reflux    Hearing impairment    High blood pressure    High cholesterol    Neuropathy    Right foot drop, and right leg weakness    Problems with swallowing    R/T fusion, liquids and solids occasionally    Screen for colon cancer    Repeat in 9/2021 due to family hx; no adenomatous polyps in 2016    Sleep apnea    Unspecified essential hypertension    Visual impairment      Past Surgical History:   Procedure Laterality Date    Back surgery  2016    hardware removed    Back surgery  2015    Shabbir put in L3 L4     Back surgery  04/2017    C4-5-6 fusion    Cholecystectomy      Colonoscopy  2017    Colonoscopy screening - referral N/A 02/12/2024    Procedure: COLONOSCOPY-SCREENING;  Surgeon: Yuri Canas MD;  Location: [facility] ENDOSCOPY    Egd  02/12/2024    Electrocardiogram, complete  07/19/2012    scanned to media tab, 07/19/2012    Inj paravert f jnt l/s 1 lev Right 09/15/2014    Procedure: FACET INJECTION UNDER FLUOROSCOPY;  Surgeon: Gustavo Gilliam DO;  Location: [facility]    M-sedaj by sm phys perfrmg svc 5+ yr Right 09/15/2014    Procedure: FACET INJECTION UNDER FLUOROSCOPY;  Surgeon: Gustavo Gilliam DO;  Location: [facility]    Surgery - external Left 12/07/2018    Rotator cuff surgery Left by [facility]      Family History   Problem Relation Age of Onset    Hypertension Mother     Thyroid disease Mother     Cancer Mother         kidney    Colon Polyps Mother     Hypertension Father     Heart Disease Father     Hypertension Brother     Cancer Maternal Grandfather         colon    Colon Cancer Maternal Grandfather     Depression Other       Social History     Socioeconomic History    Marital status:    Tobacco Use    Smoking status: Never    Smokeless tobacco: Never   Vaping Use    Vaping status: Never Used   Substance and Sexual Activity    Alcohol use: Not Currently     Comment: occasionally    Drug use: Not Currently     Frequency: 7.0 times per week     Types: Cannabis     [place] - last use 3 years ago   Other Topics Concern    Caffeine Concern Yes     Comment: soda    Grew up on a farm No    History of tanning Yes    Outdoor occupation No    Reaction to local anesthetic No    Pt has a pacemaker No    Pt has a defibrillator No      Current Medications:  Current Outpatient Medications   Medication Sig Dispense Refill    sertraline (ZOLOFT) 25 MG Oral Tab Take 1 tablet (25 mg total) by mouth daily. 90 tablet 1    topiramate 25  MG Oral Tab Take 1 tablet (25 mg total) by mouth 2 (two) times daily. 180 tablet 1    butalbital-acetaminophen-caffeine -40 MG Oral Tab Take 1 tablet by mouth every 4 (four) hours as needed for Headaches. 60 tablet 0    cyclobenzaprine 10 MG Oral Tab Take 1 tablet (10 mg total) by mouth nightly. As needed (Patient not taking: Reported on 4/4/2025) 15 tablet 0    amitriptyline 10 MG Oral Tab Take 1 tablet (10 mg total) by mouth nightly. 90 tablet 3    HYDROcodone-acetaminophen 5-325 MG Oral Tab Take 1 tablet by mouth every 6 (six) hours as needed for Pain. (Patient not taking: Reported on 2/4/2025) 30 tablet 0    aspirin 81 MG Oral Tab EC Take 1 tablet (81 mg total) by mouth daily. 30 tablet 0    fluticasone propionate 50 MCG/ACT Nasal Suspension 2 sprays by Each Nare route daily.      Multiple Vitamin (MULTI-VITAMIN DAILY) Oral Tab Take 1 tablet by mouth daily.      VALSARTAN-HYDROCHLOROTHIAZIDE 160-12.5 MG Oral Tab TAKE 1 TABLET BY MOUTH DAILY 90 tablet 3    carvedilol 12.5 MG Oral Tab Take 1 tablet (12.5 mg total) by mouth 2 (two) times daily.      AZELASTINE  MCG/SPRAY Nasal Solution SPRAY 2 SPRAYS BY NASAL ROUTE DAILY 30 mL 2      .        IMAGING STUDIES:  X-rays were reviewed with the patient today  X-rays  Images were reviewed and discussed with the patient.  They voiced understanding of what the images showed.  EXAM:  X-RAY OF CERVICAL SPINE POST OP     CLINICAL INFORMATION:  Patient is Post Op cervical fusion.     FINDINGS:    AP and Lateral views of cervical spine were completed.   Instrumentation seen from  C4-6-    No signs of hardware failure or osteolysis.   No fractures seen.  C3-4 and C6-7 spondylosis seen.  Adequate disc space noted throughout remaining levels     IMPRESSION:    Post op x-ray of cervical spine.  See body of report for details.     MRI SPINE CERVICAL (CPT=72141)    Result Date: 3/31/2025  PROCEDURE: MRI SPINE CERVICAL (CPT=72141)  COMPARISON: [facility],  CTA BRAIN + CTA CAROTIDS (CPT=70496/10712), 1/13/2025, 10:42 AM.  [facility], CT BRAIN OR HEAD (CPT=70450), 3/01/2025, 8:35 AM.  [facility] 2nd Floor, XR CERVICAL SPINE AP LAT FLEX EXT EM (CPT=72050), 2/25/2025, 1:58 PM.  [facility], MRI CERVICAL SPINE WO CONTRAST, 8/23/2011, 8:47 AM.  INDICATIONS: W19.XXXS Fall, sequela M47.22 Cervical spondylosis with radiculopathy Z98.1 S/P cervical spinal fusion M48.02 Cervical stenosis of spinal canal  TECHNIQUE: A variety of imaging planes and parameters were utilized for visualization of suspected pathology.   FINDINGS:  ALIGNMENT: The anatomic cervical lordosis is preserved.  Degenerative grade I retrolisthesis of C3 relative to C4. BONES: Postoperative changes of C4-C6 anterior cervical discectomy and fusion.  Resultant susceptibility artifacts limit evaluation.  No acute cervical spine fracture.  Benign hemangioma in the T1 vertebral body. CORD: Normal caliber, contour, and signal intensity.  CRANIOCERVICAL AREA: Normal foramen magnum without Chiari malformation.  PARASPINAL AREA: No visible mass.  OTHER: There is no visible swelling of the prevertebral soft tissues.  CERVICAL DISC LEVELS: C2-C3: Mild disc desiccation and degeneration with slight right uncovertebral joint hypertrophy/facet arthropathy.  No significant resultant neural compromise. C3-C4: Posterior disc-osteophyte complex with left greater than her head uncovertebral joint hypertrophy/facet arthropathy as well as ligamentum flavum redundancy/thickening.  Moderate multifactorial spinal canal with moderate left greater than right neural foraminal stenosis. C4-C5: Postoperative level is suboptimally evaluated given adjacent susceptibility artifacts.  No high-grade spinal canal or neural foraminal stenosis. C5-C6: Postoperative level is suboptimally evaluated given adjacent susceptibility artifacts.  No high-grade spinal  canal or neural foraminal stenosis. C6-C7: Posterior disc-osteophyte complex with left greater than right uncovertebral joint hypertrophy/facet arthropathy.  Moderate left and mild right neural foraminal with mild spinal canal stenosis. C7-T1: No significant disc/facet abnormality, spinal stenosis, or foraminal stenosis.          CONCLUSION:   1. Postoperative changes of C4-C6 anterior cervical discectomy and fusion.  Resultant susceptibility artifacts limit evaluation.  2. C3-C4:  Moderate spinal canal with moderate left greater than right neural foraminal stenosis. 3. C6-C7:  Moderate left and mild right neural foraminal with mild spinal canal stenosis.  4. Degenerative grade I retrolisthesis of C3 relative to C4.  5. Lesser incidental findings as above.   elm-remote  Dictated by (CST): Dustin Palomino MD on 3/31/2025 at 2:08 PM     Finalized by (CST): Dustin Palomino MD on 3/31/2025 at 2:14 PM               PHYSICAL EXAMIMATION   PHYSICAL EXAMINATION: Andre Howell is a 59 year old   male who is observed sitting comfortably in the exam room alert and oriented times three. RESPIRATORY RATE: 18 He looks consistent his stated age.    There were no vitals taken for this visit.  The patient is well developed, well nourished     Inspection: No acute distress.   Patient displays non-antalgic gait, and is able to normal heel walk, normal toe walk.     Coordination: Well coordinated, Fluid gait    Cervical spine:  Patient is A&O X 3 and in no apparent distress.   Neck is soft and supple with no masses or lymphadenopathy palpated.  Mild left sided tender to palpation over the para spinals.  No tenderness to the spinous process  Skin intact with no erythema, edema or ecchymosis noted  pain with left and right rotation of neck  Spulrlings Maneuver is Negative  Saab's Maneuver is negative bilaterally  Smooth pain free ROM to bilateral wrist, elbow; no shoulder pain  Sensation is intact to all dermatome patterns of  bilateral upper extremities  Tinel's is negative to bilateral cubital and carpal tunnels. negative Phalen's    Strength testing:   Left   Right   5/5 Deltoid  5/5 Deltoid    5/5 Biceps  5/5 Biceps   5/5 Triceps  5/5 Triceps    5/5 Ext Rot  5/5 Ext Rot   5/5 Wrist Ext  5/5 Wrist Ext   5/5 Intrinsics  5/5 Intrinsics    DTR  Left   Right   2+Biceps  2+ Biceps   2+Triceps  2+Triceps   2+Brichoradialis  2+Brachioradialis    Bilateral Lower Extremitis are negative for clonus                                                                                       MEDICAL DECISION MAKING:   Impression: Cervical spondylosis with radiculopathy  Left hand weakness  S/p fall    Plan: We discussed the diagnosis and treatment options today, including rationale for surgical vs non-surgical options.  The patient has had radicular symptoms since a fall over 8 weeks ago.  We reviewed his MRI and he does have stenosis most notable above his fusion at C3-4.  He has some thickening of the ligament noted there.  I did have Dr. Abdul review the MRI last week.  He did not think it was from the fall.  Patient has weakness of his right shoulder, and Dr. Sweet has worked it up and said it is not coming from anything she can see on an MRI of his shoulder.  I would like Dr. Abdul to review his MRI and see the patient.  I informed the patient that if Dr. Abdul has no concerns about regarding his stenosis we could try an epidural steroid injection.  Patient voiced understanding of this.  He was pleased with the treatment plan.  He will follow-up with Dr. Abdul next week.  Restrictions and limitations were reviewed with the patient.  Concerns were addressed.  Questions were encouraged and answered.  Patient voiced understanding.  Images were reviewed and discussed with the patient.  They voiced understanding of what the images showed.        The patient indicates understanding of these issues and agrees to the plan.    Thank you very much.      Respectfully yours,    Roberto Aldana PA-C    This note was dictated using Dragon software. While it was briefly proofread prior to completion, some grammatical, spelling, and word choice errors due to dictation may still occur.

## 2025-04-07 ENCOUNTER — TELEMEDICINE (OUTPATIENT)
Dept: SURGERY | Facility: CLINIC | Age: 60
End: 2025-04-07
Payer: COMMERCIAL

## 2025-04-07 DIAGNOSIS — M54.2 NECK PAIN: ICD-10-CM

## 2025-04-07 DIAGNOSIS — M50.20 HNP (HERNIATED NUCLEUS PULPOSUS), CERVICAL: Primary | ICD-10-CM

## 2025-04-07 DIAGNOSIS — M40.202 KYPHOSIS OF CERVICAL REGION, UNSPECIFIED KYPHOSIS TYPE: ICD-10-CM

## 2025-04-07 NOTE — PROGRESS NOTES
[facility] - SPINE SURGERY  [phone]     SPINE PROGRESS NOTE         The following individual(s) verbally consented to be recorded using ambient AI listening technology and understand that they can each withdraw their consent to this listening technology at any point by asking the clinician to turn off or pause the recording:    Patient name: Andre Howell  Additional names:          Name: Andre Howell   MRN: [id number]  Date: 4/7/2025     The following note documents an audio visual tel-health visit.    HPI:   History of Present Illness  Andre Howell is a 59 year old male with degenerative disc disorder who presents with neck and shoulder pain following a fall.    He has ongoing neck and shoulder pain, described as tension behind and off-center to the left, with associated weakness in the shoulder and occasional headaches. There is no radiation of pain down the arm to the elbow or hand. Weakness is more isolated to the shoulder area on the right, while tension runs into the shoulder area on the left.    He has a history of several spinal surgeries due to degenerative disc disorder, including a fusion from C4 to C6 performed approximately seven years ago. He has been evaluated for early arthritis, but symptoms were not attributed to the shoulder.    He experienced a fall on January 10th, resulting in a traumatic brain injury with a subdural hematoma. The fall occurred on a snowy day with ice, causing him to slip and hit the back of his head. Since the fall, he has noticed some numbness in the hands, which he did not experience prior to the incident. No dropping objects, fumbling, or experiencing numbness in the hands prior to his fall.    He is currently taking a baby aspirin daily (81 mg).      PE: Saab's: negative  IRR: negative  Sustained clonus: negative     and release: normal  Rhomberg: normal    UE Strength: 5/5 D Bi Tri WE FF FA  UE Sensation: normal in C1-T1  distribution  UE reflexes: normal          Radiographic Examination/Diagnostics:  I personally viewed, independently interpreted and radiology report was reviewed.  Results  RADIOLOGY  Cervical spine MRI: Fusion from C4 to C6 solid and well done. C3-C4 shifted backwards with neuroforaminal narrowing. C6-C7 disc herniation with left disc protrusion affecting the seventh nerve root.  Cervical spine X-ray: Fusion from C4 to C6 solid. C3 shifted backwards with degenerative changes. Anterior beaking, shifts forward when neck is flexed.  Head CT: Subdural hematoma. (01/10/2025)        IMPRESSION: Andre Howell is a 59 year old male   Assessment & Plan  Cervical degenerative disc disease with disc herniation    He has degenerative disc disease with a history of spinal fusion from C4 to C6. Imaging shows degenerative changes at C3-C4 and C6-C7, with a disc herniation at C6-C7 causing mild cord compression. Symptoms include shoulder tension, weakness, and headaches, worsened by a recent fall resulting in a traumatic brain injury. Surgical intervention is not indicated due to the absence of structural instability or acute changes. Focus on nonoperative management, including potential neck injections and physical therapy. Refer to Dr. Leon, a physiatrist, for further management. Stop aspirin one week before any injection procedure. Continue physical therapy. Monitor for new symptoms like increased arm pain or weakness and follow up if these occur.    Traumatic brain injury with subdural hematoma    He sustained a traumatic brain injury with a subdural hematoma after a fall on ice in January. Cleared by a neurosurgeon, he is scheduled to see a neurologist. The fall has exacerbated neck symptoms. Follow up with the neurologist on April 15th.        ICD-10-CM    1. HNP (herniated nucleus pulposus), cervical  M50.20 PHYSIATRY - INTERNAL      2. Kyphosis of cervical region, unspecified kyphosis type  M40.202 PHYSIATRY -  INTERNAL      3. Neck pain  M54.2 PHYSIATRY - INTERNAL           PLAN: Andre and LAURA went over imaging, prior treatments and arranged for a plan that incorporated personal goals, social circumstances and any current medical challenges.      After thorough discussion Andre will move forward with injections and further non-operative care.      Greater than 30 minutes of total time was required in the care of the patient today.  Note to patient: The 21st Century Cures Act makes medical notes like these available to patients in the interest of transparency. However, be advised this is a medical document. It is intended primarily as peer to peer communication, is written in medical language, and may contain abbreviations or verbiage that are unfamiliar. This document is intended to carry relevant information, facts as evident, and the clinical opinion of the practitioner at the time of writing.       UZMA Abdul MD  Orthopedic Spine Surgeon  [facility]   [email or web address], [facility]  t: [phone]  f: [phone]        This note was dictated using Dragon software.  While it was briefly proofread prior to completion, some grammatical, spelling, and word choice errors due to dictation may still occur.

## 2025-04-10 ENCOUNTER — OFFICE VISIT (OUTPATIENT)
Dept: PHYSICAL MEDICINE AND REHAB | Facility: CLINIC | Age: 60
End: 2025-04-10
Payer: COMMERCIAL

## 2025-04-10 VITALS — WEIGHT: 243 LBS | BODY MASS INDEX: 36.83 KG/M2 | HEIGHT: 68 IN

## 2025-04-10 DIAGNOSIS — M75.111 NONTRAUMATIC INCOMPLETE TEAR OF RIGHT ROTATOR CUFF: ICD-10-CM

## 2025-04-10 DIAGNOSIS — G44.86 CERVICOGENIC HEADACHE: ICD-10-CM

## 2025-04-10 DIAGNOSIS — S13.4XXA WHIPLASH INJURY TO NECK, INITIAL ENCOUNTER: ICD-10-CM

## 2025-04-10 DIAGNOSIS — M47.812 CERVICAL FACET JOINT SYNDROME: Primary | ICD-10-CM

## 2025-04-10 PROCEDURE — 99204 OFFICE O/P NEW MOD 45 MIN: CPT | Performed by: PHYSICAL MEDICINE & REHABILITATION

## 2025-04-10 PROCEDURE — 3008F BODY MASS INDEX DOCD: CPT | Performed by: PHYSICAL MEDICINE & REHABILITATION

## 2025-04-15 PROBLEM — M75.101 ROTATOR CUFF SYNDROME OF RIGHT SHOULDER: Status: ACTIVE | Noted: 2022-08-31

## 2025-04-15 PROBLEM — R60.0 EDEMA OF EXTREMITIES: Status: ACTIVE | Noted: 2024-09-03

## 2025-04-15 PROBLEM — G47.33 OBSTRUCTIVE SLEEP APNEA (ADULT) (PEDIATRIC): Status: ACTIVE | Noted: 2020-09-25

## 2025-04-15 PROBLEM — S43.421A SPRAIN OF RIGHT ROTATOR CUFF CAPSULE: Status: ACTIVE | Noted: 2022-09-19

## 2025-04-15 PROBLEM — G56.01 RIGHT CARPAL TUNNEL SYNDROME: Status: ACTIVE | Noted: 2022-08-31

## 2025-04-15 PROBLEM — I10 HYPERTENSION: Status: ACTIVE | Noted: 2025-04-15

## 2025-04-17 NOTE — PROGRESS NOTES
[facility]  NEW PATIENT EVALUATION    Consultation as a request of Franko Bautista      HISTORY OF PRESENT ILLNESS:     Chief Complaint   Patient presents with    New Patient     New patient is present for neck pain and referred by Dr. Abdul. Patient states the pain is a constant ache, Pain 4/10. Patient reports the pain began years ago but has been worsening since a fall in January. Denies N/T. Admits Weakness. Prior injections. Denies PT. Admits ibuprofen and Tylenol taken PRN.         History of Present Illness  The patient, with a history of degenerative disc disease and C4-6 anterior cervical discectomy and fusion, presents with ongoing headaches and neck tension. The patient reports a recent fall in January, which resulted in a subdural hematoma. The headaches, which start at the back of the head and travel into the shoulder, have been consistent since the fall. The patient describes the pain as a severe tension that develops as the day goes on. The patient has been on topiramate for the headaches, but reports no improvement.    In addition to the headaches, the patient also reports weakness in the shoulder, which has been ongoing for about two years. The patient had a shoulder surgery about four years ago, where the bicep tendon was moved and a clean out was performed.  Given his history of the cervical surgery, he followed up with his surgeon who recommended MRI imaging of the cervical spine to evaluate the hardware.  He denies any radiating symptoms in the arms, numbness tingling or weakness.  Pain is localized to the left-sided cervical spine.  He denies any saddle esthesia, any loss of bowel bladder control, any fever chills or weight loss.    The patient also reports lower back pain in the last two weeks, which has caused him to walk hunched over. The patient has had four surgeries on the lower back fusion in the past. The patient's daughter was recently  diagnosed with Hypermobile Angie-Danlos Syndrome (HEDS), and the patient wonders if he might also have signs of this condition.       PHYSICAL EXAM:   Ht 68\"   Wt 243 lb (110.2 kg)   BMI 36.95 kg/m²     Gait: Normal    CERVICAL SPINE:  Inspection: no erythema, swelling, or obvious deformity  Palpation: no ttp over spinous process.  Tenderness to palpation of left cervical paraspinals, upper trap and levator Scap with trigger points in these areas noted  ROM: intact to all planes of motion of cervical spine including side-bend bilaterally, rotation bilaterally, flexion, and extension   Strength: 5/5 in upper extremities bilaterally  Sensation: Intact to light touch in all dermatomes of the bilateral upper extremities  Reflexes: 2/4 at C5, C6, C7 bilaterally  Spurling Test: negative for radicular symptoms down either extremity bilaterally  Saab's sign: negative bilaterally      IMAGING:     MRI cervical spine completed 3/25/2025 was personally reviewed which is notable for postoperative changes of C4-6 anterior cervical discectomy and fusion with moderate spinal canal stenosis C3-4 with moderate left greater than right neuroforaminal stenosis and degenerative grade 1 retrolisthesis of C3 on C4 with degenerative facet arthropathy in the cervical spine in the mid cervical segments    All imaging results were reviewed and discussed with patient.      ASSESSMENT/PLAN:     1. Cervical facet joint syndrome    2. Whiplash injury to neck, initial encounter    3. Cervicogenic headache    4. Nontraumatic incomplete tear of right rotator cuff        Assessment & Plan  Cervicogenic Headaches  Chronic cervicogenic headaches due to facet joint arthritis and whiplash. MRI shows moderate compression and arthritis at C3-4, C4-5, C5-6. Topiramate ineffective.  - Order facet joint injections at C3-4, C4-5, C5-6 on left side, pending insurance approval. Prioritize C3-4 if limited approval.  - Continue topiramate.  - Consider  lidocaine patches.  - Advise continuation of physical therapy exercises.    Degenerative Disc Disease with Previous Cervical Fusion  Cervical fusion at C4-5-6 with adjacent level degeneration. MRI shows retrolisthesis and bulging discs without significant nerve compression.  - Monitor for symptom changes or new neurological deficits.  - Continue current neck pain management.    Partial Thickness Rotator Cuff Tear  Partial thickness rotator cuff tear contributing to shoulder weakness. Previous SLAP tear and bicep tendon relocation surgery.  - Monitor shoulder symptoms, consider future intervention if weakness persists.  - Advise use of ice and heat for relief.    Lumbar Spine Pain Post-Fusion  Increased lower back pain post-multiple lumbar surgeries, possibly related to degenerative changes post-fusion.  - Monitor lumbar spine symptoms, consider further evaluation if pain persists.         The patient verbalized understanding with the plan and was in agreement. All questions/concerns were addressed and there were no barriers to learning.  Please note Dragon dictation software was used to dictate this note and can result in inadvertent typos.    Nguyễn Leon D.O. FAAPMR & CAQSM  Physical Medicine and Rehabilitation  Sports and Spine Medicine      PAST MEDICAL HISTORY:   Past Medical History[1]      PAST SURGICAL HISTORY:   Past Surgical History[2]      CURRENT MEDICATIONS:   Current Medications[3]      ALLERGIES:   Allergies[4]      FAMILY HISTORY:   Family History[5]       SOCIAL HISTORY:   Short Social Hx on File[6]       REVIEW OF SYSTEMS:   Patient-reported ROS  Constitutional      Cardiovascular      Respiratory      Gastrointestinal      Hematology      Genitourinary      Musculoskeletal      Peripheral Vascular      Skin      Neurological      Psychiatric           PHYSICAL EXAM:     General: No immediate distress  Head: Normocephalic/ Atraumatic  Eyes: Extra-occular movements intact.   Ears: No auricular  hematoma or deformities  Mouth: No lesions or ulcerations  Heart: peripheral pulses intact. Normal capillary refill.   Lungs: Non-labored respirations  Abdomen: No abdominal guarding  Extremities: No lower extremity edema bilaterally   Skin: No lesions noted   Cognition: alert & oriented x 3, attentive, able to follow 2 step commands, comprehention intact, spontaneous speech intact  Psychiatric: Mood and affect appropriate    LABS:   No results found for: \"EAG\", \"A1C\"  Lab Results   Component Value Date    WBC 7.2 01/14/2025    RBC 4.21 (L) 01/14/2025    HGB 13.7 01/14/2025    HCT 39.0 01/14/2025    MCV 92.6 01/14/2025    MCH 32.5 01/14/2025    MCHC 35.1 01/14/2025    RDW 13.0 01/14/2025    .0 01/14/2025    MPV 8.1 07/24/2018     Lab Results   Component Value Date     (H) 01/14/2025    BUN 17 01/14/2025    BUNCREA 17.2 01/14/2025    CREATSERUM 0.99 01/14/2025    ANIONGAP 10 01/14/2025    GFRNAA 68 04/14/2022    GFRAA 79 04/14/2022    CA 9.7 01/14/2025    OSMOCALC 296 (H) 01/14/2025    ALKPHO 25 (L) 12/13/2024    AST 22 12/13/2024    ALT 18 12/13/2024    ALKPHOS 25 (L) 06/29/2016    BILT 1.5 (H) 12/13/2024    TP 6.9 12/13/2024    ALB 4.5 12/13/2024    GLOBULIN 2.4 12/13/2024    AGRATIO 1.3 06/29/2016     01/14/2025    K 3.7 01/14/2025     01/14/2025    CO2 28.0 01/14/2025     Lab Results   Component Value Date    PTP 12.9 01/13/2025    PT 12.9 07/19/2012    INR 0.91 01/13/2025     No results found for: \"VITD\", \"QVITD\", \"[id number]\"           [1]   Past Medical History:   Back pain    Back problem    Calculus of kidney    Cholelithiasis    Colon adenoma    x1    Depressive disorder    Esophageal reflux    Hearing impairment    High blood pressure    High cholesterol    Neuropathy    Right foot drop, and right leg weakness    Problems with swallowing    R/T fusion, liquids and solids occasionally    Screen for colon cancer    Repeat in 9/2021 due to family hx; no adenomatous polyps in 2016     Sleep apnea    Unspecified essential hypertension    Visual impairment   [2]   Past Surgical History:  Procedure Laterality Date    Back surgery  2016    hardware removed    Back surgery  2015    Shabbir put in L3 L4    Back surgery  04/2017    C4-5-6 fusion    Cholecystectomy      Colonoscopy  2017    Colonoscopy screening - referral N/A 02/12/2024    Procedure: COLONOSCOPY-SCREENING;  Surgeon: Yuri Canas MD;  Location: [facility] ENDOSCOPY    Egd  02/12/2024    Electrocardiogram, complete  07/19/2012    scanned to media tab, 07/19/2012    Inj paravert f jnt l/s 1 lev Right 09/15/2014    Procedure: FACET INJECTION UNDER FLUOROSCOPY;  Surgeon: Gustavo Gilliam DO;  Location: [facility]    M-sedaj by  phys perfrmg svc 5+ yr Right 09/15/2014    Procedure: FACET INJECTION UNDER FLUOROSCOPY;  Surgeon: Gustavo Gilliam DO;  Location: [facility]    Surgery - external Left 12/07/2018    Rotator cuff surgery Left by [facility]   [3]   Current Outpatient Medications   Medication Sig Dispense Refill    topiramate 25 MG Oral Tab Take 1 tablet (25 mg total) by mouth 2 (two) times daily. 180 tablet 3    sertraline (ZOLOFT) 25 MG Oral Tab Take 1 tablet (25 mg total) by mouth daily. 90 tablet 1    aspirin 81 MG Oral Tab EC Take 1 tablet (81 mg total) by mouth daily. 30 tablet 0    fluticasone propionate 50 MCG/ACT Nasal Suspension 2 sprays by Each Nare route daily.      Multiple Vitamin (MULTI-VITAMIN DAILY) Oral Tab Take 1 tablet by mouth daily.      VALSARTAN-HYDROCHLOROTHIAZIDE 160-12.5 MG Oral Tab TAKE 1 TABLET BY MOUTH DAILY 90 tablet 3    carvedilol 12.5 MG Oral Tab Take 1 tablet (12.5 mg total) by mouth 2 (two) times daily.      AZELASTINE  MCG/SPRAY Nasal Solution SPRAY 2 SPRAYS BY NASAL ROUTE DAILY 30 mL 2   [4]   Allergies  Allergen Reactions    Robaxin [Methocarbamol] RASH, FEVER and UNKNOWN    Seasonal TINITUS   [5]   Family History  Problem Relation Age of  Onset    Hypertension Mother     Thyroid disease Mother     Cancer Mother         kidney    Colon Polyps Mother     Hypertension Father     Heart Disease Father     Hypertension Brother     Cancer Maternal Grandfather         colon    Colon Cancer Maternal Grandfather     Depression Other    [6]   Social History  Socioeconomic History    Marital status:    Tobacco Use    Smoking status: Never    Smokeless tobacco: Never   Vaping Use    Vaping status: Never Used   Substance and Sexual Activity    Alcohol use: Not Currently     Comment: occasionally    Drug use: Not Currently     Frequency: 7.0 times per week     Types: Cannabis     Comment: h/o \"medical marijuanna\" - last use 3 years ago   Other Topics Concern    Caffeine Concern Yes     Comment: soda    Grew up on a farm No    History of tanning Yes    Outdoor occupation No    Reaction to local anesthetic No    Pt has a pacemaker No    Pt has a defibrillator No     Social Drivers of Health     Food Insecurity: No Food Insecurity (1/13/2025)    Food Insecurity     Food Insecurity: Never true   Transportation Needs: No Transportation Needs (1/13/2025)    Transportation Needs     Lack of Transportation: No   Housing Stability: Low Risk  (1/13/2025)    Housing Stability     Housing Instability: No

## 2025-05-12 ENCOUNTER — APPOINTMENT (OUTPATIENT)
Dept: SURGERY | Facility: CLINIC | Age: 60
End: 2025-05-12
Payer: COMMERCIAL

## 2025-05-12 ENCOUNTER — PATIENT MESSAGE (OUTPATIENT)
Dept: FAMILY MEDICINE CLINIC | Facility: CLINIC | Age: 60
End: 2025-05-12

## 2025-05-12 DIAGNOSIS — Z86.69 HISTORY OF MIGRAINE: ICD-10-CM

## 2025-05-12 DIAGNOSIS — M54.12 CERVICAL RADICULOPATHY: Primary | ICD-10-CM

## 2025-05-12 PROBLEM — M47.812 ARTHROPATHY OF CERVICAL FACET JOINT: Status: ACTIVE | Noted: 2025-05-12

## 2025-05-14 NOTE — TELEPHONE ENCOUNTER
Dr. Sheehan,    Please see patient message.   Referral pended for review.    Thank you,  Chiquita DEL CID MA

## 2025-07-24 ENCOUNTER — OFFICE VISIT (OUTPATIENT)
Dept: PHYSICAL MEDICINE AND REHAB | Facility: CLINIC | Age: 60
End: 2025-07-24
Payer: COMMERCIAL

## 2025-07-24 VITALS — HEIGHT: 68 IN | WEIGHT: 243 LBS | BODY MASS INDEX: 36.83 KG/M2

## 2025-07-24 DIAGNOSIS — M47.812 CERVICAL FACET JOINT SYNDROME: Primary | ICD-10-CM

## 2025-07-24 DIAGNOSIS — M54.12 CERVICAL RADICULOPATHY: ICD-10-CM

## 2025-07-24 DIAGNOSIS — G56.03 BILATERAL CARPAL TUNNEL SYNDROME: ICD-10-CM

## 2025-07-24 DIAGNOSIS — S13.4XXA WHIPLASH INJURY TO NECK, INITIAL ENCOUNTER: ICD-10-CM

## 2025-07-24 DIAGNOSIS — Z98.890 HISTORY OF CARPAL TUNNEL RELEASE: ICD-10-CM

## 2025-07-24 DIAGNOSIS — G44.86 CERVICOGENIC HEADACHE: ICD-10-CM

## 2025-07-24 PROCEDURE — 99214 OFFICE O/P EST MOD 30 MIN: CPT | Performed by: PHYSICAL MEDICINE & REHABILITATION

## 2025-07-24 PROCEDURE — 3008F BODY MASS INDEX DOCD: CPT | Performed by: PHYSICAL MEDICINE & REHABILITATION

## 2025-07-24 RX ORDER — PREGABALIN 50 MG/1
50 CAPSULE ORAL 2 TIMES DAILY
Qty: 60 CAPSULE | Refills: 0 | Status: SHIPPED | OUTPATIENT
Start: 2025-07-24

## 2025-07-24 NOTE — PATIENT INSTRUCTIONS
-Continue home exercises  -Ice/Heat as tolerated  -Lyrica 50mg twice daily  -See neurology  -Follow up in 2 months

## 2025-07-24 NOTE — PROGRESS NOTES
[facility]  OFFICE FOLLOW UP EVALUATION      HISTORY OF PRESENT ILLNESS:     Chief Complaint   Patient presents with    Follow - Up     LOV 4/10/25 pt is here for a follow up on neck pain after injection n. Was given  Left C3-4, C4-5 and C5-6 Facet joint injection 5/12 and had relief for 1 month and states the pain has now started to come back . Intermittent n/t. No pain meds or muscle relaxer's. No currently physical therapy. No pain just reports discomfort        History of Present Illness  Andre Howell is a 59 year old male who presents with neck pain and headaches for follow-up after cervical left-sided facet joint injections.    He experienced significant relief from neck pain and headaches for about a month following joint injections, with improved range of motion and reduced pain. Stiffness and headaches have returned, though less severe than before the injections. Pain originates from a specific spot in his neck, described as a 'knot', and can trigger migraines, leading to a 'room darkening' effect. Currently, pain is rated as a 4 out of 10 but can escalate, causing symptoms such as glassy eyes.    Tingling occurs in the first three fingers of the right hand and occasionally the whole left hand during activities like cutting the lawn or fixing glasses. He has undergone carpal tunnel syndrome surgeries on both hands.    He is taking sertraline and topiramate, which provide some relief. He continues home exercises and uses over-the-counter medications like Tylenol for pain management.        PHYSICAL EXAM:     Ht 68\"   Wt 243 lb (110.2 kg)   BMI 36.95 kg/m²     Gait: Normal     CERVICAL SPINE:  Inspection: no erythema, swelling, or obvious deformity  Palpation: no ttp over spinous process.  Tenderness to palpation of left cervical paraspinals, upper trap and levator Scap with trigger points in these areas noted  ROM: intact to all planes of motion of  cervical spine including side-bend bilaterally, rotation bilaterally, flexion, and extension   Strength: 5/5 in upper extremities bilaterally  Sensation: Intact to light touch in all dermatomes of the bilateral upper extremities  Reflexes: 2/4 at C5, C6, C7 bilaterally  Spurling Test: negative for radicular symptoms down either extremity bilaterally  Saab's sign: negative bilaterally       IMAGING:     MRI cervical spine completed 3/25/2025 was personally reviewed which is notable for postoperative changes of C4-6 anterior cervical discectomy and fusion with moderate spinal canal stenosis C3-4 with moderate left greater than right neuroforaminal stenosis and degenerative grade 1 retrolisthesis of C3 on C4 with degenerative facet arthropathy in the cervical spine in the mid cervical segments     All imaging results were reviewed and discussed with patient.      ASSESSMENT/PLAN:     1. Cervical facet joint syndrome    2. Whiplash injury to neck, initial encounter    3. Cervicogenic headache    4. Cervical radiculopathy    5. Bilateral carpal tunnel syndrome    6. History of carpal tunnel release        Assessment & Plan  Cervical facet joint syndrome  Chronic cervical facet joint syndrome with intermittent stiffness and headaches. Initial improvement post-injection, but symptoms have returned. Likely due to arthritis. Frequent injections not advisable due to side effects.  - Continue home exercises to maintain range of motion.  - Use acetaminophen for pain management.  - Consider ibuprofen for severe pain, avoid long-term use.  - Apply ice and heat for symptom relief.      Disc osteophyte complex  Disc osteophyte complex potentially contributing to nerve compression and pain.  - Trial pregabalin twice daily for nerve-related pain, starting at low dose and adjusting based on response.  - Consider epidural injection if symptoms persist and deemed appropriate  - Follow up with neurology on September 2nd to explore  additional treatment options.  Some of his symptoms may be more neurological in nature for which a consultation with neurology is appropriate    Carpal tunnel syndrome  Intermittent tingling in the first three fingers of the right hand and occasionally the entire left hand, suggestive of carpal tunnel syndrome. Symptoms may be recurring post previous surgical intervention.  - Monitor symptoms and report changes to neurologist during upcoming appointment.      The patient verbalized understanding with the plan and was in agreement. All questions/concerns were addressed and there were no barriers to learning.  Please note Dragon dictation software was used to dictate this note and may result in inadvertent typos.    Nguyễn Leon DO, FAAPMR & CAQSM  Physical Medicine and Rehabilitation  Sports and Spine Medicine    PAST MEDICAL HISTORY:   Past Medical History[1]      PAST SURGICAL HISTORY:   Past Surgical History[2]      CURRENT MEDICATIONS:   Current Medications[3]      ALLERGIES:   Allergies[4]      FAMILY HISTORY:   Family History[5]       SOCIAL HISTORY:   Short Social Hx on File[6]       REVIEW OF SYSTEMS:   A comprehensive 10 point review of systems was completed.  Pertinent positives and negatives noted in the the HPI.      LABS:   No results found for: \"EAG\", \"A1C\"  Lab Results   Component Value Date    WBC 7.2 01/14/2025    RBC 4.21 (L) 01/14/2025    HGB 13.7 01/14/2025    HCT 39.0 01/14/2025    MCV 92.6 01/14/2025    MCH 32.5 01/14/2025    MCHC 35.1 01/14/2025    RDW 13.0 01/14/2025    .0 01/14/2025    MPV 8.1 07/24/2018     Lab Results   Component Value Date     (H) 01/14/2025    BUN 17 01/14/2025    BUNCREA 17.2 01/14/2025    CREATSERUM 0.99 01/14/2025    ANIONGAP 10 01/14/2025    GFRNAA 68 04/14/2022    GFRAA 79 04/14/2022    CA 9.7 01/14/2025    OSMOCALC 296 (H) 01/14/2025    ALKPHO 25 (L) 12/13/2024    AST 22 12/13/2024    ALT 18 12/13/2024    ALKPHOS 25 (L) 06/29/2016    BILT 1.5 (H)  12/13/2024    TP 6.9 12/13/2024    ALB 4.5 12/13/2024    GLOBULIN 2.4 12/13/2024    AGRATIO 1.3 06/29/2016     01/14/2025    K 3.7 01/14/2025     01/14/2025    CO2 28.0 01/14/2025     Lab Results   Component Value Date    PTP 12.9 01/13/2025    PT 12.9 07/19/2012    INR 0.91 01/13/2025     No results found for: \"VITD\", \"QVITD\", \"[id number]\"         [1]   Past Medical History:   Back pain    Back problem    Calculus of kidney    Cholelithiasis    Colon adenoma    x1    Depressive disorder    Esophageal reflux    Hearing impairment    High blood pressure    High cholesterol    Neuropathy    Right foot drop, and right leg weakness    Problems with swallowing    R/T fusion, liquids and solids occasionally    Screen for colon cancer    Repeat in 9/2021 due to family hx; no adenomatous polyps in 2016    Sleep apnea    Unspecified essential hypertension    Visual impairment   [2]   Past Surgical History:  Procedure Laterality Date    Back surgery  2016    hardware removed    Back surgery  2015    Shabbir put in L3 L4    Back surgery  04/2017    C4-5-6 fusion    Cholecystectomy      Colonoscopy  2017    Colonoscopy screening - referral N/A 02/12/2024    Procedure: COLONOSCOPY-SCREENING;  Surgeon: Yuri Canas MD;  Location: [facility] ENDOSCOPY    Egd  02/12/2024    Electrocardiogram, complete  07/19/2012    scanned to media tab, 07/19/2012    Inj paravert f jnt l/s 1 lev Right 09/15/2014    Procedure: FACET INJECTION UNDER FLUOROSCOPY;  Surgeon: Gustavo Gilliam DO;  Location: [facility]    M-sedaj by  phys perfrmg svc 5+ yr Right 09/15/2014    Procedure: FACET INJECTION UNDER FLUOROSCOPY;  Surgeon: Gustavo Gilliam DO;  Location: [facility]    Surgery - external Left 12/07/2018    Rotator cuff surgery Left by Dr[facility]   [3]   Current Outpatient Medications   Medication Sig Dispense Refill    pregabalin (LYRICA) 50 MG Oral Cap Take 1 capsule (50 mg total) by  mouth 2 (two) times daily. 60 capsule 0    topiramate 25 MG Oral Tab Take 1 tablet (25 mg total) by mouth 2 (two) times daily. 180 tablet 3    sertraline (ZOLOFT) 25 MG Oral Tab Take 1 tablet (25 mg total) by mouth daily. 90 tablet 1    aspirin 81 MG Oral Tab EC Take 1 tablet (81 mg total) by mouth daily. 30 tablet 0    fluticasone propionate 50 MCG/ACT Nasal Suspension 2 sprays by Each Nare route in the morning.      Multiple Vitamin (MULTI-VITAMIN DAILY) Oral Tab Take 1 tablet by mouth in the morning.      VALSARTAN-HYDROCHLOROTHIAZIDE 160-12.5 MG Oral Tab TAKE 1 TABLET BY MOUTH DAILY 90 tablet 3    carvedilol 12.5 MG Oral Tab Take 12.5 mg by mouth in the morning and 12.5 mg before bedtime.      AZELASTINE  MCG/SPRAY Nasal Solution SPRAY 2 SPRAYS BY NASAL ROUTE DAILY 30 mL 2   [4]   Allergies  Allergen Reactions    Robaxin [Methocarbamol] RASH, FEVER and UNKNOWN    Seasonal TINITUS   [5]   Family History  Problem Relation Age of Onset    Hypertension Mother     Thyroid disease Mother     Cancer Mother         kidney    Colon Polyps Mother     Hypertension Father     Heart Disease Father     Hypertension Brother     Cancer Maternal Grandfather         colon    Colon Cancer Maternal Grandfather     Depression Other    [6]   Social History  Socioeconomic History    Marital status:    Tobacco Use    Smoking status: Never    Smokeless tobacco: Never   Vaping Use    Vaping status: Never Used   Substance and Sexual Activity    Alcohol use: Not Currently     Comment: occasionally    Drug use: Not Currently     Frequency: 7.0 times per week     Types: Cannabis     Comment: h/o \"medical marijuanna\" - last use 3 years ago   Other Topics Concern    Caffeine Concern Yes     Comment: soda    Grew up on a farm No    History of tanning Yes    Outdoor occupation No    Reaction to local anesthetic No    Pt has a pacemaker No    Pt has a defibrillator No     Social Drivers of Health     Food Insecurity: No Food  Insecurity (1/13/2025)    Food Insecurity     Food Insecurity: Never true   Transportation Needs: No Transportation Needs (1/13/2025)    Transportation Needs     Lack of Transportation: No   Housing Stability: Low Risk  (1/13/2025)    Housing Stability     Housing Instability: No

## 2025-07-27 DIAGNOSIS — F07.81 POST CONCUSSION SYNDROME: ICD-10-CM

## 2025-07-28 RX ORDER — SERTRALINE HYDROCHLORIDE 25 MG/1
25 TABLET, FILM COATED ORAL DAILY
Qty: 90 TABLET | Refills: 1 | Status: SHIPPED | OUTPATIENT
Start: 2025-07-28

## 2025-07-28 RX ORDER — TOPIRAMATE 25 MG/1
25 TABLET, FILM COATED ORAL 2 TIMES DAILY
Qty: 180 TABLET | Refills: 3 | Status: SHIPPED | OUTPATIENT
Start: 2025-07-28

## 2025-07-28 NOTE — TELEPHONE ENCOUNTER
The patient is requesting a refill on: TOPIRAMATE 25MG TABLETS     [place]):  [place]amate     Dispensed Written Strength Quantity Refills Days Supply Provider Pharmacy   TOPIRAMATE 25MG TABLETS 05/10/2025 02/14/2025  180 each  90 Jerri Rivas MD [facility]   TOPIRAMATE 25 MG TABS 05/09/2025 02/14/2025 25 Undefined 180 tablet  90 Jerri Rivas MD [facility]...   TOPIRAMATE 25MG TABLETS 02/16/2025 02/14/2025  180 each  90 Jerri Rivas MD [facility]...   TOPIRAMATE 25 MG TABS 02/14/2025 02/14/2025 25 Undefined 180 tablet  90 Jerri Rivas MD [facility]...     Last OV: 4/15/25  Next OV: 9/2/25  Future Appointments   Date Time Provider Department Center   9/2/2025  3:40 PM Ghode, Reena, MD [facility]   9/25/2025  8:20 AM Nguyễn Leon, DO [facility]    Assessment  Andre Howell is a 59 year old male with history of hypertension, hyperlipidemia, PORSCHE on CPAP, prior cervical fusion, migraines, who presents for follow-up of postconcussive symptoms.         Assessment & Plan  Headaches  Headaches improved with decreased frequency, managed with topiramate and occasional OTC medications. Advised limiting ibuprofen or acetaminophen to prevent overuse headaches.  - Continue topiramate with current dosing schedule.  - Refill topiramate prescription with three additional refills.  - Advise use of ibuprofen or acetaminophen no more than 2-3 times a week.  - Encourage non-pharmacological methods for tension relief, such as heating pads or baths.     Post-concussion syndrome  Residual word recall difficulties and confusion improving. Mindfulness practices suggested. Emphasized avoiding further concussions.  - Encourage mindfulness practices to improve concentration and attention.  - Advise caution to avoid further concussions, especially during summer activities.  Instructions      Return in about 4 months (around  8/15/2025).

## 2025-07-28 NOTE — TELEPHONE ENCOUNTER
Message noted: Chart reviewed and may refill medication as requested. Prescription sent to listed pharmacy. Pharmacy to notify patient. Pt notified through [employer]

## 2025-08-05 ENCOUNTER — OFFICE VISIT (OUTPATIENT)
Dept: NEUROLOGY | Facility: CLINIC | Age: 60
End: 2025-08-05

## 2025-08-05 DIAGNOSIS — G43.909 EPISODIC MIGRAINE: Primary | ICD-10-CM

## 2025-08-05 PROCEDURE — 99214 OFFICE O/P EST MOD 30 MIN: CPT | Performed by: INTERNAL MEDICINE

## 2025-08-05 PROCEDURE — G2211 COMPLEX E/M VISIT ADD ON: HCPCS | Performed by: INTERNAL MEDICINE

## 2025-08-05 RX ORDER — SUMATRIPTAN 50 MG/1
TABLET, FILM COATED ORAL
Qty: 9 TABLET | Refills: 3 | Status: SHIPPED | OUTPATIENT
Start: 2025-08-05

## 2025-08-06 RX ORDER — VALSARTAN AND HYDROCHLOROTHIAZIDE 160; 12.5 MG/1; MG/1
1 TABLET, FILM COATED ORAL DAILY
Qty: 90 TABLET | Refills: 3 | Status: SHIPPED | OUTPATIENT
Start: 2025-08-06

## 2025-08-14 ENCOUNTER — OFFICE VISIT (OUTPATIENT)
Dept: FAMILY MEDICINE CLINIC | Facility: CLINIC | Age: 60
End: 2025-08-14

## 2025-08-14 VITALS
WEIGHT: 246 LBS | HEIGHT: 68 IN | BODY MASS INDEX: 37.28 KG/M2 | RESPIRATION RATE: 16 BRPM | HEART RATE: 68 BPM | SYSTOLIC BLOOD PRESSURE: 130 MMHG | DIASTOLIC BLOOD PRESSURE: 88 MMHG | TEMPERATURE: 98 F

## 2025-08-14 DIAGNOSIS — J01.90 ACUTE SINUSITIS, RECURRENCE NOT SPECIFIED, UNSPECIFIED LOCATION: Primary | ICD-10-CM

## 2025-08-14 DIAGNOSIS — H60.501 ACUTE OTITIS EXTERNA OF RIGHT EAR, UNSPECIFIED TYPE: ICD-10-CM

## 2025-08-14 PROCEDURE — 3079F DIAST BP 80-89 MM HG: CPT | Performed by: FAMILY MEDICINE

## 2025-08-14 PROCEDURE — 3008F BODY MASS INDEX DOCD: CPT | Performed by: FAMILY MEDICINE

## 2025-08-14 PROCEDURE — 3075F SYST BP GE 130 - 139MM HG: CPT | Performed by: FAMILY MEDICINE

## 2025-08-14 PROCEDURE — 99213 OFFICE O/P EST LOW 20 MIN: CPT | Performed by: FAMILY MEDICINE

## 2025-08-14 RX ORDER — NEOMYCIN SULFATE, POLYMYXIN B SULFATE AND HYDROCORTISONE 3.5; 10000; 1 MG/ML; [IU]/ML; MG/ML
3 SOLUTION AURICULAR (OTIC) 3 TIMES DAILY
Qty: 1 EACH | Refills: 0 | Status: SHIPPED | OUTPATIENT
Start: 2025-08-14

## 2025-08-14 RX ORDER — AMOXICILLIN 875 MG/1
875 TABLET, COATED ORAL 2 TIMES DAILY
Qty: 20 TABLET | Refills: 0 | Status: SHIPPED | OUTPATIENT
Start: 2025-08-14

## (undated) DIAGNOSIS — M54.16 LUMBAR RADICULOPATHY: Primary | ICD-10-CM

## (undated) DEVICE — DILA SURG 19MM NLTX  DISP

## (undated) DEVICE — NEEDLE SPINAL 20X3-1/2 YELLOW

## (undated) DEVICE — SPONGE LAP 4X18

## (undated) DEVICE — DRAPE SRG 26X15IN UTL TPE STRL

## (undated) DEVICE — FRAZIER SUCTION INSTRUMENT 10 FR W/CONTROL VENT & OBTURATOR: Brand: FRAZIER

## (undated) DEVICE — DRAPE SRG 90X60IN BCK TBL CVR

## (undated) DEVICE — 3 ML SYRINGE LUER-LOCK TIP: Brand: MONOJECT

## (undated) DEVICE — SOL  .9 1000ML BTL

## (undated) DEVICE — SNARE OPTMZ PLPCTM TRP

## (undated) DEVICE — DRAPE SHEET LG

## (undated) DEVICE — CERVICAL CDS: Brand: MEDLINE INDUSTRIES, INC.

## (undated) DEVICE — SUCTION CANISTER, 3000CC,SAFELINER: Brand: DEROYAL

## (undated) DEVICE — C-ARMOR C-ARM EQUIPMENT COVERS CLEAR STERILE UNIVERSAL FIT 12 PER CASE: Brand: C-ARMOR

## (undated) DEVICE — UNDYED BRAIDED (POLYGLACTIN 910), SYNTHETIC ABSORBABLE SUTURE: Brand: COATED VICRYL

## (undated) DEVICE — UPPER EXTREMITY: Brand: MEDLINE INDUSTRIES, INC.

## (undated) DEVICE — CONTAINER SPEC STR 4OZ GRY LID

## (undated) DEVICE — INTROCAN SAFETY® IV CATHETER 14 GA. X 2 IN., FEP, WINGED: Brand: INTROCAN SAFETY®

## (undated) DEVICE — 3.0MM PRECISION NEURO (MATCH HEAD)

## (undated) DEVICE — MEGADYNE E-Z CLEAN 2.5\" BLADE

## (undated) DEVICE — TZ MEDICAL TITANIUM DISTRACTION PINS 12MM: Brand: TZ MEDICAL TITANIUM DISTRACTION PINS

## (undated) DEVICE — FLOSEAL SEALENT STERILE 10ML

## (undated) DEVICE — DIFFUSER: Brand: CORE, MAESTRO

## (undated) DEVICE — ENCORE® LATEX MICRO SIZE 6.5, STERILE LATEX POWDER-FREE SURGICAL GLOVE: Brand: ENCORE

## (undated) DEVICE — SUTURE PROLENE 4-0 PS-2

## (undated) DEVICE — 3M™ STERI-DRAPE™ INSTRUMENT POUCH 1018: Brand: STERI-DRAPE™

## (undated) DEVICE — 20 ML SYRINGE LUER-LOCK TIP: Brand: MONOJECT

## (undated) DEVICE — STERILE LATEX POWDER-FREE SURGICAL GLOVESWITH NITRILE COATING: Brand: PROTEXIS

## (undated) DEVICE — SUTURE MONOCRYL 4-0 PS-2

## (undated) DEVICE — ANTERIOR POSTERIOR ADD ON PACK: Brand: MEDLINE INDUSTRIES, INC.

## (undated) DEVICE — BATTERY

## (undated) DEVICE — STRETCH BANDAGE: Brand: CURITY

## (undated) DEVICE — 11.1-MULTIMODALITY IOM KIT

## (undated) DEVICE — NEEDLE CONFIDENCE SPINAL

## (undated) DEVICE — ZIMMER® STERILE DISPOSABLE TOURNIQUET CUFF WITH PLC, DUAL PORT, DUAL BLADDER, 18 IN. (46 CM)

## (undated) DEVICE — STANDARD HYPODERMIC NEEDLE,POLYPROPYLENE HUB: Brand: MONOJECT

## (undated) DEVICE — KIT ENDO ORCAPOD 160/180/190

## (undated) DEVICE — SUPER SPONGES,MEDIUM: Brand: KERLIX

## (undated) DEVICE — SUTURE VICRYL 1 OS-6

## (undated) DEVICE — DILA SURG 22MM NLTX  DISP

## (undated) DEVICE — BIPOLAR FORCEPS CORD,BANANA LEADS: Brand: VALLEYLAB

## (undated) DEVICE — SUTURE VICRYL 3-0 SH

## (undated) DEVICE — OIL CARTRIDGE: Brand: CORE, MAESTRO

## (undated) DEVICE — PEN: MARKING STD PT 100/CS: Brand: MEDICAL ACTION INDUSTRIES

## (undated) DEVICE — XL INSULATED PED SCRW PROBE

## (undated) DEVICE — CANNULA NASAL ADULT PIGTAIL L7

## (undated) DEVICE — 1 ML INSULIN SYRINGE REGULAR LUER TIP: Brand: MONOJECT

## (undated) DEVICE — NDL SPNL 10GA 5IN BEV

## (undated) DEVICE — TOWEL OR BLU 16X26 STRL

## (undated) DEVICE — STERILE TETRA-FLEX CF, ELASTIC BANDAGE, 2" X 5.5YD: Brand: TETRA-FLEX™CF

## (undated) DEVICE — TUBE SUCT STD TRNSPAR RIG W/ BLB TIP RIB 5IN1

## (undated) DEVICE — Device

## (undated) DEVICE — TUBING SUCT L12FT DIA6MM CLR N CNDTVE W/ MAXI

## (undated) DEVICE — DERMABOND LIQUID ADHESIVE

## (undated) DEVICE — FLOSEAL HEMOSTATIC MATRIX, 5ML: Brand: FLOSEAL HEMOSTATIC MATRIX

## (undated) DEVICE — Device: Brand: JELCO

## (undated) DEVICE — NON-ADHERENT DRESSING: Brand: TELFA

## (undated) DEVICE — 3M™ TEGADERM™ TRANSPARENT FILM DRESSING, 1626W, 4 IN X 4-3/4 IN (10 CM X 12 CM), 50 EACH/CARTON, 4 CARTON/CASE: Brand: 3M™ TEGADERM™

## (undated) DEVICE — 11.1-15K-WIRENITINOLW/S

## (undated) DEVICE — FRAZIER SUCTION INSTRUMENT 12 FR W/CONTROL VENT & OBTURATOR: Brand: FRAZIER

## (undated) DEVICE — SPONGE LAP 4X18 XRAY STRL

## (undated) DEVICE — NVM5 PROBE SNGL USE STER PKG

## (undated) DEVICE — GAMMEX® PI HYBRID SIZE 7, STERILE POWDER-FREE SURGICAL GLOVE, POLYISOPRENE AND NEOPRENE BLEND: Brand: GAMMEX

## (undated) DEVICE — SUTURE MONOCRYL 2-0 Y945H

## (undated) DEVICE — FLEXIBLE YANKAUER,MEDIUM TIP, NO VACUUM CONTROL: Brand: ARGYLE

## (undated) DEVICE — LUMITEX SURGICAL ILLUMINATOR

## (undated) DEVICE — SYRINGE REGULAR TIP 60ML

## (undated) DEVICE — DRAPE SRG 150X54IN LEICA

## (undated) DEVICE — ENDOPATH 5MM ENDOSCOPIC BLUNT TIP DISSECTORS (12 POUCHES CONTAINING 3 DISSECTORS EACH): Brand: ENDOPATH

## (undated) DEVICE — ESMARK: Brand: DEROYAL

## (undated) DEVICE — BAG SRG CLR 36X30IN

## (undated) DEVICE — OCCLUSIVE GAUZE STRIP,3% BISMUTH TRIBROMOPHENATE IN PETROLATUM BLEND: Brand: XEROFORM

## (undated) DEVICE — ESPOCAN® 18 GA. X 3-1/2 IN. TUOHY WITH BACKEYE LUMEN, PENCAN® 27 GA. X 5 IN. PENCIL-POINT SPINAL NEEDLE: Brand: ESPOCAN®

## (undated) DEVICE — PADDING CAST WYTEX 2\" STER

## (undated) DEVICE — NON-ADHERENT PAD PREPACK: Brand: TELFA

## (undated) DEVICE — GAMMEX® PI HYBRID SIZE 9, STERILE POWDER-FREE SURGICAL GLOVE, POLYISOPRENE AND NEOPRENE BLEND: Brand: GAMMEX

## (undated) DEVICE — OSTEOTOME INTM SGL SRG CTRS

## (undated) DEVICE — KIT CLEAN ENDOKIT 1.1OZ GOWNX2

## (undated) DEVICE — LAMINECTOMY: Brand: MEDLINE INDUSTRIES, INC.

## (undated) DEVICE — NERVE STIM CABLE LARGE CLIP

## (undated) DEVICE — DISPOSABLE 9450051 NIM 2.3MM BLL TIP PRB

## (undated) DEVICE — BANDAGE ROLL,100% COTTON, 6 PLY, SMALL: Brand: KERLIX

## (undated) DEVICE — SUTURE MONOCRYL 3-0 Y497G

## (undated) DEVICE — TUBING SCT CLR 6FT .25IN MDVC

## (undated) DEVICE — BITE BLK L LUMN SZ 20X27MM GRN PLAS FLX SIDE

## (undated) NOTE — LETTER
[facility]  Administration of Anesthesia  I, Andre Howell agree to be cared for by a physician anesthesiologist alone and/or with a nurse anesthetist, who is specially trained to monitor me and give me medicine to put me to sleep or keep me comfortable during my procedure    I understand that my anesthesiologist and/or anesthetist is not an employee or agent of [facility] or [employer]. He or she works for [facility] [facility]    As the patient asking for anesthesia services, I agree to:  Allow the anesthesiologist (anesthesia doctor) to give me medicine and do additional procedures as necessary. Some examples are: Starting or using an “IV” to give me medicine, fluids or blood during my procedure, and having a breathing tube placed to help me breathe when I’m asleep (intubation). In the event that my heart stops working properly, I understand that my anesthesiologist will make every effort to sustain my life, unless otherwise directed by [facility] Do Not Resuscitate documents.  Tell my anesthesia doctor before my procedure:  If I am pregnant.  The last time that I ate or drank.  iii. All of the medicines I take (including prescriptions, herbal supplements, and pills I can buy without a prescription (including street drugs/illegal medications). Failure to inform my anesthesiologist about these medicines may increase my risk of anesthetic complications.  iv.If I am allergic to anything or have had a reaction to anesthesia before.  I understand how the anesthesia medicine will help me (benefits).  I understand that with any type of anesthesia medicine there are risks:  The most common risks are: nausea, vomiting, sore throat, muscle soreness, damage to my eyes, mouth, or teeth (from breathing tube placement).  Rare risks include: remembering what happened during my procedure, allergic reactions to medications, injury to my airway, heart, lungs, vision, nerves, or  muscles and in extremely rare instances death.  My doctor has explained to me other choices available to me for my care (alternatives).  Pregnant Patients (“epidural”):  I understand that the risks of having an epidural (medicine given into my back to help control pain during labor), include itching, low blood pressure, difficulty urinating, headache or slowing of the baby’s heart. Very rare risks include infection, bleeding, seizure, irregular heart rhythms and nerve injury.  Regional Anesthesia (“spinal”, “epidural”, & “nerve blocks”):  I understand that rare but potential complications include headache, bleeding, infection, seizure, irregular heart rhythms, and nerve injury.    _____________________________________________________________________________  Patient (or Representative) Signature/Relationship to Patient  Date   Time    _____________________________________________________________________________   Name (if used)    Language/Organization   Time    _____________________________________________________________________________  Nurse Anesthetist Signature     Date   Time  _____________________________________________________________________________  Anesthesiologist Signature     Date   Time  I have discussed the procedure and information above with the patient (or patient’s representative) and answered their questions. The patient or their representative has agreed to have anesthesia services.    _____________________________________________________________________________  Witness        Date   Time  I have verified that the signature is that of the patient or patient’s representative, and that it was signed before the procedure  Patient Name: Andre Howell     : 1965                 Printed: 2024 at 7:08 AM    Medical Record #: [id number]                                            Page 1 of 1  ----------ANESTHESIA CONSENT----------

## (undated) NOTE — LETTER
2/26/2019          To Whom It May Concern:    Deborah Pal is currently under my medical care. Please excuse the patient from work missed as he has been ill. May return to work when well.     If you require additional information please contact our o

## (undated) NOTE — LETTER
[address], [facility]   Date:   2/16/2022     Name:   Geena Valenzuela    YOB: 1965   MRN:   [id number]       WHERE IS YOUR PAIN NOW? Roby the areas on your body where you feel the described sensations. Use the appropriate symbol. Ingris Benites the areas of radiation. Include all affected areas. Just to complete the picture, please draw in the face. ACHE:  ^ ^ ^   NUMBNESS:  0000   PINS & NEEDLES:  = = = =                              ^ ^ ^                       0000              = = = =                                    ^ ^ ^                       0000            = = = =      BURNING:  XXXX   STABBING: ////                  XXXX                ////                         XXXX          ////     Please roby the line below indicating your degree of pain right now  with 0 being no pain 10 being the worst pain possible.                                          0             1             2              3             4              5              6              7             8             9             10         Patient Signature:

## (undated) NOTE — ED AVS SNAPSHOT
Bandar Burkett   MRN: [id number]    Department:  [facility] Emergency Department   Date of Visit:  9/28/2017           Disclosure     Insurance plans vary and the physician(s) referred by the ER may not be covered by your plan.  Please contact CARE PHYSICIAN AT ONCE OR RETURN IMMEDIATELY TO THE EMERGENCY DEPARTMENT. If you have been prescribed any medication(s), please fill your prescription right away and begin taking the medication(s) as directed.   If you believe that any of the medications

## (undated) NOTE — LETTER
Patient Name: Andre Howell  YOB: 1965          MRN :  [id number]  Date:  3/27/2025  Referring Physician:  Jerri Hyde  Pt has attended 6 visits in Physical Therapy.   Reporting period: 3/5/25 to 3/27/2025       Today's Date   3/27/2025    Subjective  Pt reports that he is 100% better since beginning PT. Pt reports no more headaches and no more tension. Pt reports no longer feeling dizzy or off balance.       Pain: 0/10     Objective          C-spine AROM flex 100% ext 100% R Rot 100% L Rot 100% R % L %  SH flex B flex 5/5 B abd 5/5      Assessment  Pt demo's WFL C-spine AROM, good UE strength and no pain report. Pt has had no headaches or dizziness. Pt is independent with HEP and management of symptoms at this time. Pt is to be discharged from PT.    Goals (to be met in 20 visits)   1. Pt to be independent in their home exercise program, its’ progression, and management of their symptoms.-met  2. Pt to demonstrate and be able to verbalize strategies to aid static postural control so pt can drive > 20 minutes with < 2/10 pain.-met  3.Pt to report ability to sleep >5 hours for 3 consecutive nights with reported pain 0/10. -met  4.Pt to demonstrate WFL C-spine AROM in order to turn head to drive-met  5. Pt to increase B UE strength to 4+/5 or better in order to put away items in cabinets-met  6. Pt to report 50% or more decrease in headache symptoms throughout the day-met  7. Pt to report no dizziness symptoms to function in the community-met                  Neck Disability Index Score  Score: (Patient-Rptd) 42 % (2/26/2025  1:55 PM)    Post Neck Disability Index Score  Post Score: 0 % (3/27/2025 10:09 AM)    42 % improvement    Plan: Discharge from PT    Patient/Family/Caregiver  was advised of these findings, precautions, and treatment options and has agreed to actively participate in planning and for this course of care.    Thank you for your referral. If you have any  questions, please contact me at Dept: [phone].    Sincerely,  Electronically signed by therapist Latosha Tsai PT, DPT, cert MDT    Physician's certification required:  No             21st Century Cures Act Notice to Patient: Medical documents like this are made available to patients in the interest of transparency. However, be advised this is a medical document and it is intended as [id number] communication between your medical providers. This medical document may contain abbreviations, assessments, medical data, and results or other terms that are unfamiliar. Medical documents are intended to carry relevant information, facts as evident, and the clinical opinion of the practitioner. As such, this medical document may be written in language that appears blunt or direct. You are encouraged to contact your medical provider and/or [facility] if you have any questions about this medical document.

## (undated) NOTE — LETTER
[facility]  [address], [place], [place]  Authorization for Surgical Operation and Procedure                                                                                           I hereby authorize Yuri Canas MD, my physician and his/her assistants (if applicable), which may include medical students, residents, and/or fellows, to perform the following surgical operation/ procedure and administer such anesthesia as may be determined necessary by my physician: Operation/Procedure name (s) COLONOSCOPY-SCREENING / ESOPHAGOGASTRODUODENOSCOPY on Andre Howell   2.   I recognize that during the surgical operation/procedure, unforeseen conditions may necessitate additional or different procedures than those listed above.  I, therefore, further authorize and request that the above-named surgeon, assistants, or designees perform such procedures as are, in their judgment, necessary and desirable.    3.   My surgeon/physician has discussed prior to my surgery the potential benefits, risks and side effects of this procedure; the likelihood of achieving goals; and potential problems that might occur during recuperation.  They also discussed reasonable alternatives to the procedure, including risks, benefits, and side effects related to the alternatives and risks related to not receiving this procedure.  I have had all my questions answered and I acknowledge that no guarantee has been made as to the result that may be obtained.    4.   Should the need arise during my operation/procedure, which includes change of level of care prior to discharge, I also consent to the administration of blood and/or blood products.  Further, I understand that despite careful testing and screening of blood or blood products by collecting agencies, I may still be subject to ill effects as a result of receiving a blood transfusion and/or blood products.  The following are some, but not all, of the potential risks  that can occur: fever and allergic reactions, hemolytic reactions, transmission of diseases such as Hepatitis, AIDS and Cytomegalovirus (CMV) and fluid overload.  In the event that I wish to have an autologous transfusion of my own blood, or a directed donor transfusion, I will discuss this with my physician.  Check only if Refusing Blood or Blood Products  I understand refusal of blood or blood products as deemed necessary by my physician may have serious consequences to my condition to include possible death. I hereby assume responsibility for my refusal and release the hospital, its personnel, and my physicians from any responsibility for the consequences of my refusal.    o  Refuse   5.   I authorize the use of any specimen, organs, tissues, body parts or foreign objects that may be removed from my body during the operation/procedure for diagnosis, research or teaching purposes and their subsequent disposal by hospital authorities.  I also authorize the release of specimen test results and/or written reports to my treating physician on the hospital medical staff or other referring or consulting physicians involved in my care, at the discretion of the Pathologist or my treating physician.    6.   I consent to the photographing or videotaping of the operations or procedures to be performed, including appropriate portions of my body for medical, scientific, or educational purposes, provided my identity is not revealed by the pictures or by descriptive texts accompanying them.  If the procedure has been photographed/videotaped, the surgeon will obtain the original picture, image, videotape or CD.  The hospital will not be responsible for storage, release or maintenance of the picture, image, tape or CD.    7.   I consent to the presence of a  or observers in the operating room as deemed necessary by my physician or their designees.    8.   I recognize that in the event my procedure results in  extended X-Ray/fluoroscopy time, I may develop a skin reaction.    9. If I have a Do Not Attempt Resuscitation (DNAR) order in place, that status will be suspended while in the operating room, procedural suite, and during the recovery period unless otherwise explicitly stated by me (or a person authorized to consent on my behalf). The surgeon or my attending physician will determine when the applicable recovery period ends for purposes of reinstating the DNAR order.  10. Patients having a sterilization procedure: I understand that if the procedure is successful the results will be permanent and it will therefore be impossible for me to inseminate, conceive, or bear children.  I also understand that the procedure is intended to result in sterility, although the result has not been guaranteed.   11. I acknowledge that my physician has explained sedation/analgesia administration to me including the risk and benefits I consent to the administration of sedation/analgesia as may be necessary or desirable in the judgment of my physician.    I CERTIFY THAT I HAVE READ AND FULLY UNDERSTAND THE ABOVE CONSENT TO OPERATION and/or OTHER PROCEDURE.     _________________________________________ _________________________________     ___________________________________  Signature of Patient     Signature of Responsible Person                   Printed Name of Responsible Person                              _________________________________________ ______________________________        ___________________________________  Signature of Witness         Date  Time         Relationship to Patient    STATEMENT OF PHYSICIAN My signature below affirms that prior to the time of the procedure; I have explained to the patient and/or his/her legal representative, the risks and benefits involved in the proposed treatment and any reasonable alternative to the proposed treatment. I have also explained the risks and benefits involved in refusal of  the proposed treatment and alternatives to the proposed treatment and have answered the patient's questions. If I have a significant financial interest in a co-management agreement or a significant financial interest in any product or implant, or other significant relationship used in this procedure/surgery, I have disclosed this and had a discussion with my patient.     _______________________________________________________________ _____________________________  (Signature of Physician)                                                                                         (Date)                                   (Time)  Patient Name: Andre Howell    : 1965   Printed: 2024      Medical Record #: [id number]                                              Page 1 of 1

## (undated) NOTE — MR AVS SNAPSHOT
[facility] [place] [address] [address] [phone]               Thank you for choosing us for your health care visit with Jill Spencer MD.  We are glad to serve you and happy to provide you with this summary of y Take 1 tablet by mouth every 6 (six) hours as needed for Pain. Commonly known as:  NORCO           Valsartan-Hydrochlorothiazide 160-12.5 MG Tabs   Take 1 tablet by mouth once daily.                    MyChart     Visit [facility]  You can access your Sovahar

## (undated) NOTE — LETTER
8/30/2021    Tamela Howell        [address]            Dear Elizabeth Burris,      Our records indicate that you are due for an appointment for a Colonoscopy in September 2021, or shortly there after, with [address]

## (undated) NOTE — ED AVS SNAPSHOT
Curry Constantino   MRN: [id number]    Department:  [facility] Emergency Department   Date of Visit:  1/2/2019           Disclosure     Insurance plans vary and the physician(s) referred by the ER may not be covered by your plan.  Please contact CARE PHYSICIAN AT ONCE OR RETURN IMMEDIATELY TO THE EMERGENCY DEPARTMENT. If you have been prescribed any medication(s), please fill your prescription right away and begin taking the medication(s) as directed.   If you believe that any of the medications

## (undated) NOTE — MR AVS SNAPSHOT
[facility] - [place] [address] [address] [address] [phone]               Thank you for choosing us for your health care visit with Veronica Garcia MD.  We are glad to serve you and happy to provide you with this summary of y Today's Vital Signs     BP Pulse Temp Height Weight BMI    143/103 mmHg 80 98.5 °F (36.9 °C) (Oral) 5' 8\" (1.727 m) 251 lb (113.853 kg) 38.17 kg/m2         Current Medications          This list is accurate as of: 4/5/17  4:10 PM.  Always use your most re

## (undated) NOTE — Clinical Note
Dear Hira Hoffman,    Thank you for sending Hiral Nicholas to see me for physiatry consultation. I appreciate your confidence in me to care for your patients. Please feel free call me with any questions at [phone] or contact me through [facility]     Sincerely,  Cecy Bryant MD  Board Certified, Physical Medicine and Rehabilitation  Board certified, [address]      CC: Dr. Phil Gibbs

## (undated) NOTE — LETTER
ENCOUNTER  Patient Class:   Admitting Provider: No admitting provider for patient encounter. Unit:     Hospital Service: No service for patient encounter. Attending Provider: No current attending provider for patient encounter.  Bed:     Visit Type:   Re Payor Plan Address Payor Plan Phone Number Payor Plan Fax Number Effective Dates   [address]   10/1/2015 - None Entered   [facility] [phone]      Subscriber Name [address] Birth Date Member ID    Sonia Zoe 11/20/1965 Marleny Campbell nasal air flow, nasal air pressure, snoring, chest and abdominal wall motion, oximetry, and body position. I have reviewed the entirety of the raw data of this study. During the study, total recording time is 478 minutes.   The lights-out clock time is 11       Process Instructions:                   Electronically signed by: Leroy Howard MD [facility] # Ivonne Roblero MD   Physician   Family Medicine   Progress Notes      Signed   Encount • Multiple Vitamins-Minerals (MULTIVITAMIN ADULT OR) Take by mouth.          Allergies:  Robaxin [Methocarba*    RASH, FEVER, UNKNOWN  Cauliflower             ITCHING   PHYSICAL EXAM:   Physical Exam   Constitutional: He appears well-developed and well-nou

## (undated) NOTE — ED AVS SNAPSHOT
[facility] in [address] [address] [address]    Phone:  [phone]    Fax:  [phone]           Fernando Bueno   MRN: [id number]    Department:  [facility] in [address] [place] [address]   Date of Visit: not participate in your health insurance plan. This may result in a lower benefit level being available to you or other limited reimbursement.   The physician may seek payment directly from you for amounts other than your deductible, co-payment, or co-insu prescription right away and begin taking the medication(s) as directed.   If you believe that any of the medications or instructions on this list is different from what your Primary Care doctor has instructed you - please continue to take your medications a coverage. Patient [address] is a Federal Navigator program that can help with your Affordable Care Act coverage, as well as all types of Medicaid plans.   To get signed up and covered, please call [phone] and ask to get set up for an insuran

## (undated) NOTE — MR AVS SNAPSHOT
[facility] - [place] [address] [address]  [address] [phone]               Thank you for choosing us for your health care visit with Karyna Healy MD.  We are glad to serve you and happy to provide you with this summary of y discharge instructions in [facility] by going to Visits < Admission Summaries. If you've been to the Emergency Department or your doctor's office, you can view your past visit information in [facility]of[facility] by going to Visits < Visit Summaries. Crashmob questions? Eat plenty of low-fat dairy products High fat meats and dairy   Choose whole grain products Foods high in sodium   Water is best for hydration Fast food.    Eat at home when possible     Tips for increasing your physical activity – Adults who are physically

## (undated) NOTE — IP AVS SNAPSHOT
[facility] [address] ~ [phone]                Discharge Summary   4/19/2017    Kari Brooke           Admission Information        Provider Department    4/19/2017 Millie Yeung MD [facility] Or Incision:  Ok to get wet in a shower two days after surgery, no dressing needed at that time. Walk as much as possible after surgery. No bending, lifting more than 10#, or twisting. Wear your soft collar when out of bed.   Keep your head still, no neck r neck. The achy feeling should go away in the next 24 hours  · To feel weak, sleepy or \"wiped out\". Your should start feeling better in the next 24 hours.    · To experience mild discomforts such as sore lip or tongue, headache, cramps, gas pains or a bloa Instructions and Information about Your Health     None      Follow-up Information     Follow up with Litzy Perdomo MD. Schedule an appointment as soon as possible for a visit in 5 days.     Specialties:  SURGERY, ORTHOPEDIC, Surgery, Spine, Surgery, Adele Show you to explore options for quitting.     - If you have concerns related to behavioral health issues or thoughts of harming yourself, contact [facility] at [phone].     - If you don’t have insurance, Gwendolyn Stephens